# Patient Record
Sex: MALE | Race: WHITE | NOT HISPANIC OR LATINO | URBAN - METROPOLITAN AREA
[De-identification: names, ages, dates, MRNs, and addresses within clinical notes are randomized per-mention and may not be internally consistent; named-entity substitution may affect disease eponyms.]

---

## 2017-01-25 ENCOUNTER — COMMUNICATION - HEALTHEAST (OUTPATIENT)
Dept: INTERNAL MEDICINE | Facility: CLINIC | Age: 72
End: 2017-01-25

## 2017-02-01 ENCOUNTER — RECORDS - HEALTHEAST (OUTPATIENT)
Dept: ADMINISTRATIVE | Facility: OTHER | Age: 72
End: 2017-02-01

## 2017-03-06 ENCOUNTER — RECORDS - HEALTHEAST (OUTPATIENT)
Dept: ADMINISTRATIVE | Facility: OTHER | Age: 72
End: 2017-03-06

## 2017-03-28 ENCOUNTER — OFFICE VISIT (OUTPATIENT)
Dept: INTERNAL MEDICINE CLINIC | Age: 72
End: 2017-03-28

## 2017-03-28 VITALS
OXYGEN SATURATION: 97 % | HEIGHT: 68 IN | SYSTOLIC BLOOD PRESSURE: 144 MMHG | HEART RATE: 66 BPM | DIASTOLIC BLOOD PRESSURE: 86 MMHG | RESPIRATION RATE: 12 BRPM | BODY MASS INDEX: 25.61 KG/M2 | TEMPERATURE: 97.9 F | WEIGHT: 169 LBS

## 2017-03-28 DIAGNOSIS — F33.1 MODERATE EPISODE OF RECURRENT MAJOR DEPRESSIVE DISORDER (HCC): ICD-10-CM

## 2017-03-28 DIAGNOSIS — E55.9 VITAMIN D INSUFFICIENCY: ICD-10-CM

## 2017-03-28 DIAGNOSIS — Z12.11 SCREEN FOR COLON CANCER: ICD-10-CM

## 2017-03-28 DIAGNOSIS — G47.00 INSOMNIA, UNSPECIFIED TYPE: ICD-10-CM

## 2017-03-28 DIAGNOSIS — E78.5 HYPERLIPIDEMIA, UNSPECIFIED HYPERLIPIDEMIA TYPE: ICD-10-CM

## 2017-03-28 DIAGNOSIS — K21.00 GASTROESOPHAGEAL REFLUX DISEASE WITH ESOPHAGITIS: ICD-10-CM

## 2017-03-28 DIAGNOSIS — I10 BENIGN ESSENTIAL HTN: Primary | ICD-10-CM

## 2017-03-28 RX ORDER — PNEUMOCOCCAL 13-VALENT CONJUGATE VACCINE 2.2; 2.2; 2.2; 2.2; 2.2; 4.4; 2.2; 2.2; 2.2; 2.2; 2.2; 2.2; 2.2 UG/.5ML; UG/.5ML; UG/.5ML; UG/.5ML; UG/.5ML; UG/.5ML; UG/.5ML; UG/.5ML; UG/.5ML; UG/.5ML; UG/.5ML; UG/.5ML; UG/.5ML
0.5 INJECTION, SUSPENSION INTRAMUSCULAR ONCE
Qty: 1 SYRINGE | Refills: 0 | Status: SHIPPED | OUTPATIENT
Start: 2017-03-28 | End: 2017-03-28

## 2017-03-28 RX ORDER — ZOLPIDEM TARTRATE 5 MG/1
5 TABLET ORAL
Qty: 90 TAB | Refills: 1
Start: 2017-03-28 | End: 2017-07-25 | Stop reason: SDUPTHER

## 2017-03-28 RX ORDER — BUPROPION HYDROCHLORIDE 300 MG/1
300 TABLET ORAL
Qty: 90 TAB | Refills: 1
Start: 2017-03-28 | End: 2022-08-29 | Stop reason: SDUPTHER

## 2017-03-28 RX ORDER — ATORVASTATIN CALCIUM 40 MG/1
40 TABLET, FILM COATED ORAL DAILY
Qty: 90 TAB | Refills: 1
Start: 2017-03-28 | End: 2017-07-25 | Stop reason: SDUPTHER

## 2017-03-28 RX ORDER — OMEPRAZOLE 20 MG/1
20 TABLET, DELAYED RELEASE ORAL
Qty: 45 TAB | Refills: 1
Start: 2017-03-28 | End: 2017-04-24

## 2017-03-28 RX ORDER — ATENOLOL 25 MG/1
25 TABLET ORAL 2 TIMES DAILY
Qty: 180 TAB | Refills: 1 | Status: SHIPPED | OUTPATIENT
Start: 2017-03-28 | End: 2017-07-25 | Stop reason: SDUPTHER

## 2017-03-28 RX ORDER — ACETAMINOPHEN 500 MG
2000 TABLET ORAL DAILY
Qty: 30 CAP | Refills: 11
Start: 2017-03-28

## 2017-03-28 RX ORDER — SILDENAFIL 50 MG/1
50 TABLET, FILM COATED ORAL AS NEEDED
Qty: 8 TAB | Refills: 3
Start: 2017-03-28

## 2017-03-28 RX ORDER — ATORVASTATIN CALCIUM 20 MG/1
20 TABLET, FILM COATED ORAL DAILY
Qty: 90 TAB | Refills: 1
Start: 2017-03-28 | End: 2022-08-29 | Stop reason: SDUPTHER

## 2017-03-28 RX ORDER — LOSARTAN POTASSIUM AND HYDROCHLOROTHIAZIDE 12.5; 1 MG/1; MG/1
1 TABLET ORAL DAILY
Qty: 90 TAB | Refills: 1 | Status: SHIPPED | OUTPATIENT
Start: 2017-03-28 | End: 2017-11-20 | Stop reason: ALTCHOICE

## 2017-03-28 RX ORDER — ASPIRIN 81 MG/1
81 TABLET ORAL DAILY
Qty: 30 TAB | Refills: 11
Start: 2017-03-28

## 2017-03-28 NOTE — PROGRESS NOTES
Patient presents to establish care. Needs a colonoscopy. Has HTN, been fluctuating. Vaccines. Last 2 weeks had a GI issue, but it is better now.

## 2017-03-28 NOTE — PROGRESS NOTES
HISTORY OF PRESENT ILLNESS    New patient to my practice, referred to me by self. Prior medical care has been from Dr. Franco Bernal in [place], [place]. he works as a [employer]. Moved to [facility] 2017. [employer]. his  past medical history was reviewed, discussed, and summarized in the list below. Hypertension  Reports a long history of fluctuating blood pressures. He did see cardiology for this in the past.  Has been on various medications and was on a higher dose of atenolol in the past.  Is currently taking atenolol 25 mg twice daily and Hyzaar 50-12 0.5 mg. Denies side effects on these medications. Hypertension ROS: taking medications as instructed, no medication side effects noted, no TIA's, no chest pain on exertion, no dyspnea on exertion, no swelling of ankles     reports that he quit smoking about 28 years ago. He does not have any smokeless tobacco history on file. has no alcohol history on file. BP Readings from Last 2 Encounters:   03/28/17 144/86     Hyperlipidemia  He does have a history of a high coronary score several years ago. Has had stress tests since that time withdrawn normal.  Last stress test was 3 years ago. .  Taking Lipitor  40 mg until December 2016 when it was increased to 60 mg. In  He is very active and is exercising. December, his LDL cholesterol was 83. His most recent labs on March 6, 2017 showed cholesterol 149, triglyceride 96, HDL 64, LDL 66. Hepatic functions are normal.       History of depression and insomnia. Started after 1 of his divorces. Has been taking Wellbutrin for several years and it is well tolerated. Also taking Ambien for sleep. He travels often. Try to wean off Wellbutrin but became very irritable and depressed. Denies any alcohol or drug abuse.     Review of Systems   All other systems reviewed and are negative, except as noted in HPI      Past Medical and Surgical History  Past Medical History: Diagnosis Date    Arm paresthesia, right     from rototor cuff injury    Benign essential HTN     Depression, major 1999    from divorce. on wellburtrin. failed prozac.  sees psych in [place] ED (erectile dysfunction)     GERD (gastroesophageal reflux disease)     High coronary artery calcium score 2005    last stress test 2014    History of bronchitis     Hyperlipidemia     Insomnia     Personal history of multiple pulmonary nodules     no further f/u needed. followed by serial CTs, last 2010. 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 Current Outpatient Prescriptions   Medication Sig    atorvastatin (LIPITOR) 20 mg tablet Take 1 Tab by mouth daily. Takes 60 mg total    atorvastatin (LIPITOR) 40 mg tablet Take 1 Tab by mouth daily. Takes 60 mg total    losartan-hydroCHLOROthiazide (HYZAAR) 100-12.5 mg per tablet Take 1 Tab by mouth daily. Increased 3/28/17    atenolol (TENORMIN) 25 mg tablet Take 1 Tab by mouth two (2) times a day.  zolpidem (AMBIEN) 5 mg tablet Take 1 Tab by mouth nightly as needed for Sleep. Max Daily Amount: 5 mg.  buPROPion XL (WELLBUTRIN XL) 300 mg XL tablet Take 1 Tab by mouth every morning.  omeprazole (PRILOSEC OTC) 20 mg tablet Take 20 mg by mouth every fourty-eight (48) hours for 360 days.  sildenafil citrate (VIAGRA) 50 mg tablet Take 1 Tab by mouth as needed.  Cholecalciferol, Vitamin D3, (VITAMIN D3) 2,000 unit cap capsule Take 2,000 Units by mouth daily.  aspirin delayed-release 81 mg tablet Take 1 Tab by mouth daily.  varicella zoster vacine live (ZOSTAVAX) 19,400 unit/0.65 mL susr injection 1 Vial by SubCUTAneous route once for 1 dose.  Bring this to your pharmacy  Indications: PREVENTION OF HERPES ZOSTER    PREVNAR 13, PF, 0.5 mL syrg injection 0.5 mL by IntraMUSCular route once for 1 dose. PLEASE HAVE THIS AT YOUR LOCAL PHARMACY  Indications: PREVENTION OF STREPTOCOCCUS PNEUMONIAE INFECTION     No current facility-administered medications for this visit. reports that he quit smoking about 28 years ago. He does not have any smokeless tobacco history on file. family history includes Heart Disease in his brother and father; Hypertension in his father. Physical Exam   Nursing note and vitals reviewed. Blood pressure 144/86, pulse 66, temperature 97.9 °F (36.6 °C), temperature source Oral, resp. rate 12, height 5' 7.5\" (1.715 m), weight 169 lb (76.7 kg), SpO2 97 %. Constitutional: oriented to person, place, and time. No distress. Eyes: Conjunctivae are normal.   HEENT:  No cervical lymphadenopathy. No thyroid nodules or goiter  Cardiovascular: Normal rate. Regular rhythm, no murmurs, rubs. No edema  Pulmonary/Chest: Effort normal. clear to auscultation  Abdominal: soft, non-tender, non-distended  Musculoskeletal:     Neurological: Alert and oriented to person, place. Cranial nerves grossly intact. Normal gait   Skin: No rash noted. Psychiatric: Normal mood and affect. Behavior is normal.     ASSESSMENT and Racheal Singh was seen today for establish care. Diagnoses and all orders for this visit:    Benign essential HTN blood   pressure is borderline controlled. Recommended increasing Losartan to 100 mg.  -     losartan-hydroCHLOROthiazide (HYZAAR) 100-12.5 mg per tablet; Take 1 Tab by mouth daily. Increased 3/28/17  -     atenolol (TENORMIN) 25 mg tablet; Take 1 Tab by mouth two (2) times a day. -     aspirin delayed-release 81 mg tablet; Take 1 Tab by mouth daily. Hyperlipidemia, unspecified hyperlipidemia type  Appears well controlled on 60 mg of Lipitor daily. -     atorvastatin (LIPITOR) 20 mg tablet; Take 1 Tab by mouth daily.  Takes 60 mg total  -     atorvastatin (LIPITOR) 40 mg tablet; Take 1 Tab by mouth daily. Takes 60 mg total    Gastroesophageal reflux disease with esophagitis   controlled on every other day PPI. The pathophysiology of reflux is discussed. Anti-reflux measures such as raising the head of the bed, avoiding tight clothing or belts, avoiding eating late at night and not lying down shortly after mealtime and achieving weight loss are discussed. Avoid ASA, NSAID's, caffeine, peppermints, alcohol and tobacco. he should alert me if there are persistent symptoms, dysphagia, weight loss or GI bleeding.  -     omeprazole (PRILOSEC OTC) 20 mg tablet; Take 20 mg by mouth every fourty-eight (48) hours for 360 days. Screen for colon cancer  -     REFERRAL FOR COLONOSCOPY    Moderate episode of recurrent major depressive disorder ([facility] [address] stable and controlled for years. Continue current medication  -     buPROPion XL (WELLBUTRIN XL) 300 mg XL tablet; Take 1 Tab by mouth every morning. Insomnia, unspecified type-    Controlled on current regimen. Continue current medications as written in chart. zolpidem (AMBIEN) 5 mg tablet; Take 1 Tab by mouth nightly as needed for Sleep. Max Daily Amount: 5 mg. Vitamin D insufficiency  -     Cholecalciferol, Vitamin D3, (VITAMIN D3) 2,000 unit cap capsule; Take 2,000 Units by mouth daily. Controlled with vitamin D levels of 43 on labs done in December    Other orders  -     sildenafil citrate (VIAGRA) 50 mg tablet; Take 1 Tab by mouth as needed. -     varicella zoster vacine live (ZOSTAVAX) 19,400 unit/0.65 mL susr injection; 1 Vial by SubCUTAneous route once for 1 dose. Bring this to your pharmacy  Indications: PREVENTION OF HERPES ZOSTER  -     PREVNAR 13, PF, 0.5 mL syrg injection; 0.5 mL by IntraMUSCular route once for 1 dose.  PLEASE HAVE THIS AT YOUR LOCAL PHARMACY  Indications: PREVENTION OF STREPTOCOCCUS PNEUMONIAE INFECTION        There are no Patient Instructions on file for this visit.    lab results and schedule of future lab studies reviewed with patient  reviewed medications and side effects in detail      Return to clinic in 6 months to repeat your blood work.

## 2017-03-28 NOTE — MR AVS SNAPSHOT
Visit Information Date & Time Provider Department Dept. Phone Encounter #  
 3/28/2017  8:00 AM Vinnie Del Valle MD [facility] [address] [id number] Upcoming Health Maintenance Date Due DTaP/Tdap/Td series (1 - Tdap) 4/20/1964 FOBT Q 1 YEAR AGE 50-75 4/20/1993 ZOSTER VACCINE AGE 60> 4/20/2003 GLAUCOMA SCREENING Q2Y 4/20/2008 Pneumococcal 65+ Low/Medium Risk (1 of 2 - PCV13) 4/20/2008 MEDICARE YEARLY EXAM 4/20/2008 INFLUENZA AGE 9 TO ADULT 8/1/2016 Allergies as of 3/28/2017  Review Complete On: 3/28/2017 By: Vinnie Del Valle MD  
 No Known Allergies Current Immunizations  Reviewed on 3/28/2017 Name Date Influenza Vaccine 10/1/2016 Pneumococcal Polysaccharide (PPSV-23) 1/10/2014 Tdap 1/1/2015 Reviewed by Vinnie Del Valle MD on 3/28/2017 at  8:26 AM  
 Reviewed by Vinnie Del Valle MD on 3/28/2017 at  8:38 AM  
 Reviewed by Vinnie Del Valle MD on 3/28/2017 at  8:38 AM  
 Reviewed by Vinnie Del Valle MD on 3/28/2017 at  8:43 AM  
You Were Diagnosed With   
  
 Codes Comments Benign essential HTN    -  Primary ICD-10-CM: I10 
ICD-9-CM: 401.1 Hyperlipidemia, unspecified hyperlipidemia type     ICD-10-CM: E78.5 ICD-9-CM: 272.4 Gastroesophageal reflux disease with esophagitis     ICD-10-CM: K21.0 ICD-9-CM: 530.11 Screen for colon cancer     ICD-10-CM: Z12.11 ICD-9-CM: V76.51 Moderate episode of recurrent major depressive disorder (HCC)     ICD-10-CM: F33.1 ICD-9-CM: 296.32 Insomnia, unspecified type     ICD-10-CM: G47.00 ICD-9-CM: 780.52 Vitamin D insufficiency     ICD-10-CM: E55.9 ICD-9-CM: 268.9 Vitals BP Pulse Temp Resp Height(growth percentile) Weight(growth percentile) 144/86 (BP 1 Location: Left arm, BP Patient Position: Sitting) 66 97.9 °F (36.6 °C) (Oral) 12 5' 7.5\" (1.715 m) 169 lb (76.7 kg) SpO2 BMI Smoking Status 97% 26.08 kg/m2 Former Smoker Vitals History BMI and BSA Data Body Mass Index Body Surface Area 26.08 kg/m 2 1.91 m 2 Preferred Pharmacy Pharmacy Name Phone [facility], [address] [phone] Your Updated Medication List  
  
   
This list is accurate as of: 3/28/17  8:51 AM.  Always use your most recent med list.  
  
  
  
  
 aspirin delayed-release 81 mg tablet Take 1 Tab by mouth daily. atenolol 25 mg tablet Commonly known as:  TENORMIN Take 1 Tab by mouth two (2) times a day. * atorvastatin 20 mg tablet Commonly known as:  LIPITOR Take 1 Tab by mouth daily. Takes 60 mg total  
  
 * atorvastatin 40 mg tablet Commonly known as:  LIPITOR Take 1 Tab by mouth daily. Takes 60 mg total  
  
 buPROPion  mg XL tablet Commonly known as:  Leora Corleytte Take 1 Tab by mouth every morning. Cholecalciferol (Vitamin D3) 2,000 unit Cap capsule Commonly known as:  VITAMIN D3 Take 2,000 Units by mouth daily. losartan-hydroCHLOROthiazide 100-12.5 mg per tablet Commonly known as:  HYZAAR Take 1 Tab by mouth daily. Increased 3/28/17  
  
 omeprazole 20 mg tablet Commonly known as:  PRILOSEC OTC Take 20 mg by mouth every fourty-eight (48) hours for 360 days. PREVNAR 13 (PF) 0.5 mL Syrg injection Generic drug:  pneumococcal 13 angelo conj dip  
0.5 mL by IntraMUSCular route once for 1 dose. PLEASE HAVE THIS AT YOUR LOCAL PHARMACY  Indications: PREVENTION OF STREPTOCOCCUS PNEUMONIAE INFECTION  
  
 sildenafil citrate 50 mg tablet Commonly known as:  VIAGRA Take 1 Tab by mouth as needed. varicella zoster vacine live 19,400 unit/0.65 mL Susr injection Commonly known as:  ZOSTAVAX  
1 Vial by SubCUTAneous route once for 1 dose. Bring this to your pharmacy  Indications: PREVENTION OF HERPES ZOSTER  
  
 zolpidem 5 mg tablet Commonly known as:  AMBIEN  
 Take 1 Tab by mouth nightly as needed for Sleep. Max Daily Amount: 5 mg.  
  
 * Notice: This list has 2 medication(s) that are the same as other medications prescribed for you. Read the directions carefully, and ask your doctor or other care provider to review them with you. Prescriptions Printed Refills  
 varicella zoster vacine live (ZOSTAVAX) 19,400 unit/0.65 mL susr injection 0 Si Vial by SubCUTAneous route once for 1 dose. Bring this to your pharmacy  Indications: PREVENTION OF HERPES ZOSTER Class: Print Route: SubCUTAneous PREVNAR 13, PF, 0.5 mL syrg injection 0 Si.5 mL by IntraMUSCular route once for 1 dose. PLEASE HAVE THIS AT YOUR LOCAL PHARMACY  Indications: PREVENTION OF STREPTOCOCCUS PNEUMONIAE INFECTION Class: Print Route: IntraMUSCular Prescriptions Sent to Pharmacy Refills  
 losartan-hydroCHLOROthiazide (HYZAAR) 100-12.5 mg per tablet 1 Sig: Take 1 Tab by mouth daily. Increased 3/28/17 Class: Normal  
 Pharmacy: [facility] , [address] Ph #: [phone] Route: Oral  
 atenolol (TENORMIN) 25 mg tablet 1 Sig: Take 1 Tab by mouth two (2) times a day. Class: Normal  
 Pharmacy: [facility], [address] [facility] [address] Ph #: [phone] Route: Oral  
  
We Performed the Following REFERRAL FOR COLONOSCOPY [[id number] Custom] Comments:  
 Please evaluate patient for colonoscopy. Referral Information Referral ID Referred By Referred To  
  
 [id number] [phone]  [facility] Visits Status Start Date End Date 1 New Request 3/28/17 3/28/18 If your referral has a status of pending review or denied, additional information will be sent to support the outcome of this decision. Introducing \[facility]\"" & HEALTH SERVICES! Amina Monroe introduces [facility] patient portal. Now you can access parts of your medical record, email your doctor's office, and request medication refills online. 1. In your internet browser, go to [email or web address] 2. Click on the First Time User? Click Here link in the Sign In box. You will see the New Member Sign Up page. 3. Enter your [facility] Access Code exactly as it appears below. You will not need to use this code after youve completed the sign-up process. If you do not sign up before the expiration date, you must request a new code. · [facility] Access Code: [email or web address] Expires: 6/26/2017  8:51 AM 
 
4. Enter the last four digits of your Social Security Number (xxxx) and Date of Birth (mm/dd/yyyy) as indicated and click Submit. You will be taken to the next sign-up page. 5. Create a [facility] ID. This will be your [facility] login ID and cannot be changed, so think of one that is secure and easy to remember. 6. Create a [facility] password. You can change your password at any time. 7. Enter your Password Reset Question and Answer. This can be used at a later time if you forget your password. 8. Enter your e-mail address. You will receive e-mail notification when new information is available in [address] 9. Click Sign Up. You can now view and download portions of your medical record. 10. Click the Download Summary menu link to download a portable copy of your medical information. If you have questions, please visit the Frequently Asked Questions section of the [facility] website. Remember, [facility] is NOT to be used for urgent needs. For medical emergencies, dial 911. Now available from your iPhone and Android! Please provide this summary of care documentation to your next provider. Your primary care clinician is listed as Collie Link. If you have any questions after today's visit, please call [phone].

## 2017-03-30 ENCOUNTER — COMMUNICATION - HEALTHEAST (OUTPATIENT)
Dept: INTERNAL MEDICINE | Facility: CLINIC | Age: 72
End: 2017-03-30

## 2017-04-06 ENCOUNTER — COMMUNICATION - HEALTHEAST (OUTPATIENT)
Dept: INTERNAL MEDICINE | Facility: CLINIC | Age: 72
End: 2017-04-06

## 2017-04-19 ENCOUNTER — COMMUNICATION - HEALTHEAST (OUTPATIENT)
Dept: INTERNAL MEDICINE | Facility: CLINIC | Age: 72
End: 2017-04-19

## 2017-04-24 NOTE — PERIOP NOTES
[address]  Endoscopy Preprocedure Instructions      1. On the day of your surgery, please report to registration located on the 2nd floor of the  [facility]. yes    2. You must have a responsible adult to drive you to the hospital, stay at the hospital during your procedure and drive you home. If they leave your procedure will not be started (no exceptions). yes    3. Do not have anything to eat or drink (including water, gum, mints, coffee, and juice) after midnight. This does not apply to the medications you were instructed to take by your physician. yesIf you are currently taking Plavix, Coumadin, Aspirin, or other blood-thinning agents, contact your physician for special instructions. yes,    4. If you are having a procedure that requires bowel prep: We recommend that you have only clear liquids the day before your procedure and begin your bowel prep by 5:00 pm.  You may continue to drink clear liquids until midnight. If for any reason you are not able to complete your prep please notify your physician immediately. yes    5. Have a list of all current medications, including vitamins, herbal supplements and any other over the counter medications. yes    6. If you wear glasses, contacts, dentures and/or hearing aids, they may be removed prior to procedure, please bring a case to store them in. yes    7. You should understand that if you do not follow these instructions your procedure may be cancelled. If your physical condition changes (I.e. fever, cold or flu) please contact your doctor as soon as possible. 8. It is important that you be on time.   If for any reason you are unable to keep your appointment please call )- the day of or your physicians office prior to your scheduled procedure

## 2017-04-25 ENCOUNTER — ANESTHESIA (OUTPATIENT)
Dept: ENDOSCOPY | Age: 72
End: 2017-04-25
Payer: MEDICARE

## 2017-04-25 ENCOUNTER — HOSPITAL ENCOUNTER (OUTPATIENT)
Age: 72
Setting detail: OUTPATIENT SURGERY
Discharge: HOME | End: 2017-04-25
Attending: INTERNAL MEDICINE | Admitting: INTERNAL MEDICINE
Payer: MEDICARE

## 2017-04-25 ENCOUNTER — ANESTHESIA EVENT (OUTPATIENT)
Dept: ENDOSCOPY | Age: 72
End: 2017-04-25
Payer: MEDICARE

## 2017-04-25 ENCOUNTER — RECORDS - HEALTHEAST (OUTPATIENT)
Dept: ADMINISTRATIVE | Facility: OTHER | Age: 72
End: 2017-04-25

## 2017-04-25 VITALS
WEIGHT: 158 LBS | OXYGEN SATURATION: 100 % | BODY MASS INDEX: 23.95 KG/M2 | DIASTOLIC BLOOD PRESSURE: 62 MMHG | RESPIRATION RATE: 14 BRPM | HEIGHT: 68 IN | SYSTOLIC BLOOD PRESSURE: 126 MMHG | HEART RATE: 51 BPM | TEMPERATURE: 97.5 F

## 2017-04-25 LAB
H PYLORI FROM TISSUE: NEGATIVE
KIT LOT NO., HCLOLOT: NORMAL
NEGATIVE CONTROL: NEGATIVE
POSITIVE CONTROL: POSITIVE

## 2017-04-25 PROCEDURE — 76060000032 HC ANESTHESIA 0.5 TO 1 HR: Performed by: INTERNAL MEDICINE

## 2017-04-25 PROCEDURE — 74011250636 HC RX REV CODE- 250/636

## 2017-04-25 PROCEDURE — 74011000250 HC RX REV CODE- 250

## 2017-04-25 PROCEDURE — 77030009426 HC FCPS BIOP ENDOSC BSC -B: Performed by: INTERNAL MEDICINE

## 2017-04-25 PROCEDURE — 74011250636 HC RX REV CODE- 250/636: Performed by: INTERNAL MEDICINE

## 2017-04-25 PROCEDURE — 88305 TISSUE EXAM BY PATHOLOGIST: CPT | Performed by: INTERNAL MEDICINE

## 2017-04-25 PROCEDURE — 76040000007: Performed by: INTERNAL MEDICINE

## 2017-04-25 PROCEDURE — 87077 CULTURE AEROBIC IDENTIFY: CPT | Performed by: INTERNAL MEDICINE

## 2017-04-25 RX ORDER — LIDOCAINE HYDROCHLORIDE 20 MG/ML
INJECTION, SOLUTION EPIDURAL; INFILTRATION; INTRACAUDAL; PERINEURAL AS NEEDED
Status: DISCONTINUED | OUTPATIENT
Start: 2017-04-25 | End: 2017-04-25 | Stop reason: HOSPADM

## 2017-04-25 RX ORDER — FLUMAZENIL 0.1 MG/ML
0.2 INJECTION INTRAVENOUS
Status: DISCONTINUED | OUTPATIENT
Start: 2017-04-25 | End: 2017-04-25 | Stop reason: HOSPADM

## 2017-04-25 RX ORDER — PROPOFOL 10 MG/ML
INJECTION, EMULSION INTRAVENOUS AS NEEDED
Status: DISCONTINUED | OUTPATIENT
Start: 2017-04-25 | End: 2017-04-25 | Stop reason: HOSPADM

## 2017-04-25 RX ORDER — MIDAZOLAM HYDROCHLORIDE 1 MG/ML
.25-5 INJECTION, SOLUTION INTRAMUSCULAR; INTRAVENOUS
Status: DISCONTINUED | OUTPATIENT
Start: 2017-04-25 | End: 2017-04-25 | Stop reason: HOSPADM

## 2017-04-25 RX ORDER — SODIUM CHLORIDE 9 MG/ML
INJECTION, SOLUTION INTRAVENOUS
Status: DISCONTINUED | OUTPATIENT
Start: 2017-04-25 | End: 2017-04-25 | Stop reason: HOSPADM

## 2017-04-25 RX ORDER — PROPOFOL 10 MG/ML
INJECTION, EMULSION INTRAVENOUS
Status: DISCONTINUED | OUTPATIENT
Start: 2017-04-25 | End: 2017-04-25 | Stop reason: HOSPADM

## 2017-04-25 RX ORDER — ATROPINE SULFATE 0.1 MG/ML
0.4 INJECTION INTRAVENOUS
Status: DISCONTINUED | OUTPATIENT
Start: 2017-04-25 | End: 2017-04-25 | Stop reason: HOSPADM

## 2017-04-25 RX ORDER — NALOXONE HYDROCHLORIDE 0.4 MG/ML
0.4 INJECTION, SOLUTION INTRAMUSCULAR; INTRAVENOUS; SUBCUTANEOUS
Status: DISCONTINUED | OUTPATIENT
Start: 2017-04-25 | End: 2017-04-25 | Stop reason: HOSPADM

## 2017-04-25 RX ORDER — DEXTROMETHORPHAN/PSEUDOEPHED 2.5-7.5/.8
1.2 DROPS ORAL
Status: DISCONTINUED | OUTPATIENT
Start: 2017-04-25 | End: 2017-04-25 | Stop reason: HOSPADM

## 2017-04-25 RX ORDER — EPINEPHRINE 0.1 MG/ML
1 INJECTION INTRACARDIAC; INTRAVENOUS
Status: DISCONTINUED | OUTPATIENT
Start: 2017-04-25 | End: 2017-04-25 | Stop reason: HOSPADM

## 2017-04-25 RX ORDER — SODIUM CHLORIDE 9 MG/ML
50 INJECTION, SOLUTION INTRAVENOUS CONTINUOUS
Status: DISCONTINUED | OUTPATIENT
Start: 2017-04-25 | End: 2017-04-25 | Stop reason: HOSPADM

## 2017-04-25 RX ADMIN — PROPOFOL 70 MG: 10 INJECTION, EMULSION INTRAVENOUS at 12:37

## 2017-04-25 RX ADMIN — SODIUM CHLORIDE: 9 INJECTION, SOLUTION INTRAVENOUS at 11:48

## 2017-04-25 RX ADMIN — PROPOFOL 140 MCG/KG/MIN: 10 INJECTION, EMULSION INTRAVENOUS at 12:37

## 2017-04-25 RX ADMIN — SODIUM CHLORIDE: 9 INJECTION, SOLUTION INTRAVENOUS at 13:00

## 2017-04-25 RX ADMIN — LIDOCAINE HYDROCHLORIDE 40 MG: 20 INJECTION, SOLUTION EPIDURAL; INFILTRATION; INTRACAUDAL; PERINEURAL at 12:37

## 2017-04-25 RX ADMIN — SODIUM CHLORIDE 50 ML/HR: 900 INJECTION, SOLUTION INTRAVENOUS at 12:16

## 2017-04-25 NOTE — ROUTINE PROCESS
Barbara Kimberyl  1945  [id number]    Situation:  Verbal report received from: Ayana Mulligan RN  Procedure: Procedure(s):  COLONOSCOPY / EGD   ESOPHAGOGASTRODUODENOSCOPY (EGD)  ESOPHAGOGASTRODUODENAL (EGD) BIOPSY  COLON BIOPSY    Background:    Preoperative diagnosis: NAUSEA   CHANGE IN BOWEL HABITS   Postoperative diagnosis: normal egd, diverticulosis, hemorrhoids, normal terminal ileum    :  Dr. Jessica Miranda  Assistant(s): Endoscopy Technician-1: Rylee Alfredo  Endoscopy RN-1: Ciara Seals RN    Specimens:   ID Type Source Tests Collected by Time Destination   1 : biopsy duodenum Preservative Duodenum  Shelli Wall MD 4/25/2017 1243 Pathology   2 : random colon biopsy Royal Hicks MD 4/25/2017 1257 Pathology     H. Pylori  yes    Assessment:  Intra-procedure medications     Anesthesia gave intra-procedure sedation and medications, see anesthesia flow sheet yes    Intravenous fluids: NS@ KVO     Vital signs stable     Abdominal assessment: round and soft     Recommendation:  Discharge patient per MD order. Family or Friend   Permission to share finding with family or friend yes    Endoscopy discharge instructions have been reviewed and given to patient and spouse. The patient and spouse verbalized understanding and acceptance of instructions.

## 2017-04-25 NOTE — ANESTHESIA POSTPROCEDURE EVALUATION
Post-Anesthesia Evaluation and Assessment    Patient: Hernandez Bates MRN: [id number]  SSN: [id number]    YOB: 1945  Age: 74 y.o. Sex: male       Cardiovascular Function/Vital Signs  Visit Vitals    /62    Pulse (!) 51    Temp 36.4 °C (97.5 °F)    Resp 14    Ht 5' 7.5\" (1.715 m)    Wt 71.7 kg (158 lb)    SpO2 100%    BMI 24.38 kg/m2       Patient is status post MAC anesthesia for Procedure(s):  COLONOSCOPY / EGD   ESOPHAGOGASTRODUODENOSCOPY (EGD)  ESOPHAGOGASTRODUODENAL (EGD) BIOPSY  COLON BIOPSY. Nausea/Vomiting: None    Postoperative hydration reviewed and adequate. Pain:  Pain Scale 1: Numeric (0 - 10) (04/25/17 1341)  Pain Intensity 1: 0 (04/25/17 1341)   Managed    Neurological Status: At baseline    Mental Status and Level of Consciousness: Arousable    Pulmonary Status:   O2 Device: Room air (04/25/17 1341)   Adequate oxygenation and airway patent    Complications related to anesthesia: None    Post-anesthesia assessment completed.  No concerns    Signed By: Av Zaldivar MD     April 25, 2017

## 2017-04-25 NOTE — DISCHARGE INSTRUCTIONS
[address] [facility]. Isaak Milton M.D.  [phone]                 COLON and EGD DISCHARGE INSTRUCTIONS    2017    Julius Rogers  :  1945  [place] Medical Record Number:  [id number]      DISCOMFORT:  Sore throat- throat lozenges or warm salt water gargle  Redness at IV site- apply warm compress to area; if redness or soreness persist- contact your physician  There may be a slight amount of blood passed from the rectum  Gaseous discomfort- walking, belching will help relieve any discomfort  You may not operate a vehicle for 12 hours  You may not engage in an occupation involving machinery or appliances for rest of today  You may not drink alcoholic beverages for at least 12 hours  Avoid making any critical decisions for at least 24 hour  DIET:   Low fat diet. - however -  remember your colon is empty and a heavy meal will produce gas. Avoid these foods:  vegetables, fried / greasy foods, carbonated drinks for today     ACTIVITY:  You may  resume your normal daily activities it is recommended that you spend the remainder of the day resting -  avoid any strenuous activity and driving. CALL M.D. ANY SIGN OF:   Increasing pain, nausea, vomiting  Abdominal distension (swelling)  New increased bleeding (oral or rectal)  Fever (chills)  Pain in chest area  Bloody discharge from nose or mouth  Shortness of breath      Follow-up Instructions:   Call Dr. Avani Curran if any questions at [phone]. Results of procedure / biopsy in 7 to 10 days, we will call you with these results. Your endoscopy showed normal mucosa throughout the esophagus, stomach and duodenum. Your colonoscopy showed normal mucosa throughout the colon and terminal ileum. Mild diverticulosis in the sigmoid colon and internal hemorrhoids. Biopsies were obtained.

## 2017-04-25 NOTE — H&P
The patient is a 74year old male who presents with a complaint of change in bowel habits. The patient presents consultation at the request of Dr. Bia South). Note for \"Change in bowel habits\": He started few weeks ago with alternating constipation and diarrhea and has traveled to [facility] and [facility]. He has been taking fiber supplements. He reports this is not what it used to be. He had 3 colonoscopies in the past, one in [place], then one in [place] and last one was 8 years ago. He reports occaisonal bowel movements at night. He reports intermittent nausea, denies pain or weight loss. Denies any bleeding. He has not started any medications recently. When he has diarrhea, he has 3 to 4 bowel movements a day. He denies any sick contacts. Problem List/Past Medical [facility]; 4/20/2017 2:45 PM)  Depression   Hypercholesterolemia   Hypertension     Past Surgical History [facility]; 4/20/2017 2:45 PM)  Rotator Cuff Repair - Right   Appendectomy     Allergies [facility]; 4/20/2017 2:45 PM)  No Known Allergies 04/19/2017  No Known Drug Allergies 04/19/2017    Medication History [facility]; 4/20/2017 2:44 PM)  PriLOSEC  (10MG Packet, 1 Oral every other day) Active. Aspirin  (81MG Tablet DR, 1 Oral daily) Active. Lipitor  (40MG Tablet, Oral daily) Active. Lipitor  (20MG Tablet, Oral daily) Active. Wellbutrin XL  (300MG Tablet ER 24HR, Oral daily) Active. Atenolol  (25MG Tablet, Oral twice a day) Active. Hyzaar  (50-12.5MG Tablet, Oral twice a day) Active. Ambien  (5MG Tablet, Oral as needed) Active. Vitamin D  (2000UNIT Tablet, Oral daily) Active. Viagra  (100MG Tablet, Oral as needed) Active. Medications Reconciled     Family History [facility]; 4/20/2017 2:45 PM)  Hypertension  Mother, Brother, Father. Heart Disease  Brother. Social History [facility]; 4/20/2017 2:45 PM)  Marital status  . Employment status  N/a.   Blood Transfusion  No.  Alcohol Use  Moderate alcohol use.  Tobacco Use  Former smoker. Diagnostic Studies History Jaelyn Tineo; 4/20/2017 2:45 PM)  Colonoscopy     Health Maintenance History Jaelyn Tineo; 4/20/2017 2:45 PM)  Lora Civil Anamika Alicia Date: 2016. Pneumovax  Date: 2016. Review of Systems [facility] D. Laverta Bamberger; 4/20/2017 2:43 PM)  General Not Present- Chronic Fatigue, Poor Appetite, Weight Gain and Weight Loss. Skin Not Present- Itching, Rash and Skin Color Changes. HEENT Not Present- Hearing Loss and Vertigo. Respiratory Not Present- Difficulty Breathing and TB exposure. Cardiovascular Not Present- Chest Pain, Use of Antibiotics before Dental Procedures and Use of Blood Thinners. Gastrointestinal Present- See HPI. Musculoskeletal Not Present- Arthritis, Hip Replacement Surgery and Knee Replacement Surgery. Neurological Not Present- Weakness. Psychiatric Present- Depression. Endocrine Not Present- Diabetes and Thyroid Problems. Hematology Not Present- Anemia. Vitals [facility] D. Laverta Bamberger; 4/20/2017 2:42 PM)  4/20/2017 2:22 PM  Weight: 167 lb   Height: 67 in    Height was reported by patient. Body Surface Area: 1.89 m²   Body Mass Index: 26.16 kg/m²  Temp.: 100.4° F     BP: 130/80 (Sitting, Left Arm, Standard)        Assessment & Plan (Anselmo Palomino MD; 4/25/2017 12:37 PM)  Diarrhea (787.91  R19.7)  Impression: Several etiologies to be considered, rule out bacterial overgrowth, microscopic colitis or celiac disease. Samples of Xifaxan samples were given to him and recommended Florastor BID. Will proceed with work-up as outlined below. Current Plans    CBC, PLATELETS & AUT DIFF (92586)  CMP (30573)  CELIAC DISEASE, COMP (03981)  C-REACTIVE PROTEIN (28143)  SED RATE ERYTHROCYTE (50913)  VITAMIN B-12 (CYANOCOBALAMIN) (50862)  ASSAY, ELASTASE, PANCREATIC, FECAL (25311)  TSH (30289)  EGD W/BIOPSY (08430)  COLONOSCOPY/BIOPSY (50448)  Started Suprep Bowel Prep, 1 (one) Solution once, 1 Kit, 1 day starting 04/20/2017, No Refill.   CRYPTOSPORIDUM/GIARDI, INFCT ANTIGEN (57893)  Culture, Stool (05296)  OVA & PARASITE DIR SMEAR (78554)  Pt Education - How to access health information online: discussed with patient and provided information. Patient is to call me for any questions or concerns.   Follow up office visit in 2 months

## 2017-04-25 NOTE — PROCEDURES
Toshia Dalton M.D.  [phone]           2017                EGD Operative Report  [facility]  [facility]  1945  [facility]  [id number]      Indication:  Abdominal pain, epigastric, Diarrhea-presumed infectious origin     : Josie Tracy MD    Referring Provider:  Elizabeth Root MD      Anesthesia/Sedation:  MAC anesthesia    Airway assessment: No airway problems anticipated    Pre-Procedural Exam:      Airway: clear, no airway problems anticipated  Heart: RRR, without gallops or rubs  Lungs: clear bilaterally without wheezes, crackles, or rhonchi  Abdomen: soft, nontender, nondistended, bowel sounds present  Mental Status: awake, alert and oriented to person, place and time       Procedure Details     After infomed consent was obtained for the procedure, with all risks and benefits of procedure explained the patient was taken to the endoscopy suite and placed in the left lateral decubitus position. Following sequential administration of sedation as per above, the endoscope was inserted into the mouth and advanced under direct vision to second portion of the duodenum. A careful inspection was made as the gastroscope was withdrawn, including a retroflexed view of the proximal stomach; findings and interventions are described below. Findings:   Esophagus:normal  Stomach: normal   Duodenum/jejunum: normal    Therapies:  none    Specimens: Pyloritek and duodenum           Complications:   None; patient tolerated the procedure well. EBL:  None. Impression:    Upper endoscopy within normal    Recommendations:    -Await pathology. , -Await BRIANNA test result and treat for Helicobacter pylori if positive.     Josie Tracy MD

## 2017-04-25 NOTE — ANESTHESIA PREPROCEDURE EVALUATION
Anesthetic History   No history of anesthetic complications            Review of Systems / Medical History  Patient summary reviewed, nursing notes reviewed and pertinent labs reviewed    Pulmonary  Within defined limits                 Neuro/Psych         Psychiatric history     Cardiovascular    Hypertension              Exercise tolerance: >4 METS     GI/Hepatic/Renal  Within defined limits              Endo/Other  Within defined limits           Other Findings              Physical Exam    Airway  Mallampati: II  TM Distance: 4 - 6 cm  Neck ROM: normal range of motion   Mouth opening: Normal     Cardiovascular    Rhythm: regular  Rate: normal         Dental    Dentition: Lower dentition intact, Upper dentition intact and Caps/crowns     Pulmonary  Breath sounds clear to auscultation               Abdominal         Other Findings            Anesthetic Plan    ASA: 2  Anesthesia type: MAC          Induction: Intravenous  Anesthetic plan and risks discussed with: Patient

## 2017-04-25 NOTE — IP AVS SNAPSHOT
[address] [place] [address] 104 1007 [facility] 
[phone] Patient: Mya Cook MRN: [id number] NGV:4[birth year]92/3089 You are allergic to the following No active allergies Recent Documentation Height Weight BMI Smoking Status 1.715 m 71.7 kg 24.38 kg/m2 Former Smoker Emergency Contacts Name Discharge Info Relation Home Work Mobile [address] CAREGIVER [3] Spouse [3]   [phone] About your hospitalization You were admitted on:  April 25, 2017 You last received care in the:  [facility] ENDOSCOPY You were discharged on:  April 25, 2017 Unit phone number:  [phone] Why you were hospitalized Your primary diagnosis was:  Not on File Providers Seen During Your Hospitalizations Provider Role Specialty Primary office phone Merle Escobar MD Attending Provider Gastroenterology [phone] Your Primary Care Physician (PCP) Primary Care Physician Office Phone Office Fax Alexsandra Case [phone] Follow-up Information None Current Discharge Medication List  
  
CONTINUE these medications which have NOT CHANGED Dose & Instructions Dispensing Information Comments Morning Noon Evening Bedtime  
 aspirin delayed-release 81 mg tablet Your last dose was: Your next dose is:    
   
   
 Dose:  81 mg Take 1 Tab by mouth daily. Quantity:  30 Tab Refills:  11  
     
   
   
   
  
 atenolol 25 mg tablet Commonly known as:  TENORMIN Your last dose was: Your next dose is:    
   
   
 Dose:  25 mg Take 1 Tab by mouth two (2) times a day. Quantity:  180 Tab Refills:  1  
     
   
   
   
  
 * atorvastatin 20 mg tablet Commonly known as:  LIPITOR Your last dose was: Your next dose is:    
   
   
 Dose:  20 mg Take 1 Tab by mouth daily.  Takes 60 mg total  
 Quantity:  90 Tab Refills:  1  
     
   
   
   
  
 * atorvastatin 40 mg tablet Commonly known as:  LIPITOR Your last dose was: Your next dose is:    
   
   
 Dose:  40 mg Take 1 Tab by mouth daily. Takes 60 mg total  
 Quantity:  90 Tab Refills:  1  
     
   
   
   
  
 buPROPion  mg XL tablet Commonly known as:  Jaqui Legerin Your last dose was: Your next dose is:    
   
   
 Dose:  300 mg Take 1 Tab by mouth every morning. Quantity:  90 Tab Refills:  1 Cholecalciferol (Vitamin D3) 2,000 unit Cap capsule Commonly known as:  VITAMIN D3 Your last dose was: Your next dose is:    
   
   
 Dose:  2000 Units Take 2,000 Units by mouth daily. Quantity:  30 Cap Refills:  11  
     
   
   
   
  
 losartan-hydroCHLOROthiazide 100-12.5 mg per tablet Commonly known as:  HYZAAR Your last dose was: Your next dose is:    
   
   
 Dose:  1 Tab Take 1 Tab by mouth daily. Increased 3/28/17 Quantity:  90 Tab Refills:  1  
     
   
   
   
  
 sildenafil citrate 50 mg tablet Commonly known as:  VIAGRA Your last dose was: Your next dose is:    
   
   
 Dose:  50 mg Take 1 Tab by mouth as needed. Quantity:  8 Tab Refills:  3  
     
   
   
   
  
 zolpidem 5 mg tablet Commonly known as:  AMBIEN Your last dose was: Your next dose is:    
   
   
 Dose:  5 mg Take 1 Tab by mouth nightly as needed for Sleep. Max Daily Amount: 5 mg. Quantity:  90 Tab Refills:  1  
     
   
   
   
  
 * Notice: This list has 2 medication(s) that are the same as other medications prescribed for you. Read the directions carefully, and ask your doctor or other care provider to review them with you. Discharge Instructions [facility]. [facility] Cheng Lopez M.D. 
[phone] COLON and EGD DISCHARGE INSTRUCTIONS 
 
4/25/2017 Elaine Elam :  1945 [place] Medical Record Number:  [id number] DISCOMFORT: 
Sore throat- throat lozenges or warm salt water gargle Redness at IV site- apply warm compress to area; if redness or soreness persist- contact your physician There may be a slight amount of blood passed from the rectum Gaseous discomfort- walking, belching will help relieve any discomfort You may not operate a vehicle for 12 hours You may not engage in an occupation involving machinery or appliances for rest of today You may not drink alcoholic beverages for at least 12 hours Avoid making any critical decisions for at least 24 hour DIET: 
 Low fat diet.  however -  remember your colon is empty and a heavy meal will produce gas. Avoid these foods:  vegetables, fried / greasy foods, carbonated drinks for today ACTIVITY: 
You may  resume your normal daily activities it is recommended that you spend the remainder of the day resting -  avoid any strenuous activity and driving. CALL M.D. ANY SIGN OF: Increasing pain, nausea, vomiting Abdominal distension (swelling) New increased bleeding (oral or rectal) Fever (chills) Pain in chest area Bloody discharge from nose or mouth Shortness of breath Follow-up Instructions: 
 Call Dr. Roxy Alvarez if any questions at [phone]. Results of procedure / biopsy in 7 to 10 days, we will call you with these results. Your endoscopy showed normal mucosa throughout the esophagus, stomach and duodenum. Your colonoscopy showed normal mucosa throughout the colon and terminal ileum. Mild diverticulosis in the sigmoid colon and internal hemorrhoids. Biopsies were obtained. Discharge Orders None Introducing \[facility] [place] [facility]\"" & HEALTH SERVICES! Dear Jeanette Knight: Thank you for requesting a [employer] account. Our records indicate that you already have an active [email or web address] account. You can access your account anytime at [email or web address]. [email or web address] Did you know that you can access your hospital and ER discharge instructions at any time in [employer]? You can also review all of your test results from your hospital stay or ER visit. Additional Information If you have questions, please visit the Frequently Asked Questions section of the [employer] website at [email or web address] Remember, [employer]uehart is NOT to be used for urgent needs. For medical emergencies, dial 911. Now available from your iPhone and Android! General Information Please provide this summary of care documentation to your next provider. Patient Signature:  ____________________________________________________________ Date:  ____________________________________________________________  
  
Michael Kulwinder Provider Signature:  ____________________________________________________________ Date:  ____________________________________________________________

## 2017-06-18 ENCOUNTER — COMMUNICATION - HEALTHEAST (OUTPATIENT)
Dept: INTERNAL MEDICINE | Facility: CLINIC | Age: 72
End: 2017-06-18

## 2017-09-13 ENCOUNTER — COMMUNICATION - HEALTHEAST (OUTPATIENT)
Dept: INTERNAL MEDICINE | Facility: CLINIC | Age: 72
End: 2017-09-13

## 2017-09-13 DIAGNOSIS — I25.10 CORONARY ATHEROSCLEROSIS: ICD-10-CM

## 2017-09-26 ENCOUNTER — RECORDS - HEALTHEAST (OUTPATIENT)
Dept: ADMINISTRATIVE | Facility: OTHER | Age: 72
End: 2017-09-26

## 2017-09-27 ENCOUNTER — COMMUNICATION - HEALTHEAST (OUTPATIENT)
Dept: INTERNAL MEDICINE | Facility: CLINIC | Age: 72
End: 2017-09-27

## 2017-09-27 DIAGNOSIS — Z00.00 HEALTH CARE MAINTENANCE: ICD-10-CM

## 2017-09-27 DIAGNOSIS — Z12.5 PROSTATE CANCER SCREENING: ICD-10-CM

## 2017-09-27 DIAGNOSIS — K21.9 GERD (GASTROESOPHAGEAL REFLUX DISEASE): ICD-10-CM

## 2017-09-27 DIAGNOSIS — I25.10 CAD (CORONARY ARTERY DISEASE): ICD-10-CM

## 2017-11-20 ENCOUNTER — OFFICE VISIT (OUTPATIENT)
Dept: INTERNAL MEDICINE CLINIC | Age: 72
End: 2017-11-20

## 2017-11-20 VITALS
HEART RATE: 59 BPM | WEIGHT: 167 LBS | SYSTOLIC BLOOD PRESSURE: 149 MMHG | BODY MASS INDEX: 26.21 KG/M2 | DIASTOLIC BLOOD PRESSURE: 65 MMHG | RESPIRATION RATE: 12 BRPM | TEMPERATURE: 97.5 F | HEIGHT: 67 IN

## 2017-11-20 DIAGNOSIS — I10 BENIGN ESSENTIAL HTN: ICD-10-CM

## 2017-11-20 DIAGNOSIS — L08.9 INFECTED SUPERFICIAL INJURY OF LEFT HAND, INITIAL ENCOUNTER: Primary | ICD-10-CM

## 2017-11-20 DIAGNOSIS — S60.922A INFECTED SUPERFICIAL INJURY OF LEFT HAND, INITIAL ENCOUNTER: Primary | ICD-10-CM

## 2017-11-20 RX ORDER — AMOXICILLIN AND CLAVULANATE POTASSIUM 875; 125 MG/1; MG/1
1 TABLET, FILM COATED ORAL 2 TIMES DAILY
Qty: 20 TAB | Refills: 0 | Status: SHIPPED | OUTPATIENT
Start: 2017-11-20 | End: 2017-11-29 | Stop reason: SDUPTHER

## 2017-11-20 RX ORDER — ATENOLOL 50 MG/1
TABLET ORAL
Qty: 135 TAB | Refills: 1
Start: 2017-11-20 | End: 2019-01-28 | Stop reason: SDUPTHER

## 2017-11-20 NOTE — MR AVS SNAPSHOT
Visit Information Date & Time Provider Department Dept. Phone Encounter #  
 11/20/2017 11:00 AM Megan Aleman MD [facility] [address] [place] [id number] Upcoming Health Maintenance Date Due FOBT Q 1 YEAR AGE 50-75 4/20/1993 ZOSTER VACCINE AGE 60> 2/20/2003 GLAUCOMA SCREENING Q2Y 4/20/2008 Pneumococcal 65+ Low/Medium Risk (2 of 2 - PCV13) 1/10/2015 Influenza Age 5 to Adult 8/1/2017 MEDICARE YEARLY EXAM 12/14/2017 DTaP/Tdap/Td series (2 - Td) 1/1/2025 Allergies as of 11/20/2017  Review Complete On: 11/20/2017 By: Megan Aleman MD  
 No Known Allergies Current Immunizations  Reviewed on 3/28/2017 Name Date Influenza Vaccine 10/1/2016 Pneumococcal Polysaccharide (PPSV-23) 1/10/2014 Tdap 1/1/2015 Not reviewed this visit You Were Diagnosed With   
  
 Codes Comments Infected superficial injury of left hand, initial encounter    -  Primary ICD-10-CM: M17.433N, L08.9 ICD-9-CM: 914.9 Benign essential HTN     ICD-10-CM: I10 
ICD-9-CM: 401.1 Vitals BP Pulse Temp Resp Height(growth percentile) Weight(growth percentile) 149/65 (!) 59 97.5 °F (36.4 °C) 12 5' 7\" (1.702 m) 167 lb (75.8 kg) BMI Smoking Status 26.16 kg/m2 Former Smoker Vitals History BMI and BSA Data Body Mass Index Body Surface Area  
 26.16 kg/m 2 1.89 m 2 Preferred Pharmacy Pharmacy Name Phone [facility], [address] [phone] Your Updated Medication List  
  
   
This list is accurate as of: 11/20/17 12:11 PM.  Always use your most recent med list.  
  
  
  
  
 amoxicillin-clavulanate 875-125 mg per tablet Commonly known as:  AUGMENTIN Take 1 Tab by mouth two (2) times a day for 10 days. aspirin delayed-release 81 mg tablet Take 1 Tab by mouth daily. atenolol 50 mg tablet Commonly known as:  TENORMIN Take 1 pill in AM and 1/2 pill in PM  
  
 * atorvastatin 20 mg tablet Commonly known as:  LIPITOR Take 1 Tab by mouth daily. Takes 60 mg total  
  
 * atorvastatin 40 mg tablet Commonly known as:  LIPITOR Take 1 Tab by mouth daily. Takes 60 mg total  
  
 buPROPion  mg XL tablet Commonly known as:  Johnice Abdul Take 1 Tab by mouth every morning. Cholecalciferol (Vitamin D3) 2,000 unit Cap capsule Commonly known as:  VITAMIN D3 Take 2,000 Units by mouth daily. losartan-hydroCHLOROthiazide 50-12.5 mg per tablet Commonly known as:  HYZAAR Take 1 Tab by mouth two (2) times a day. sildenafil citrate 50 mg tablet Commonly known as:  VIAGRA Take 1 Tab by mouth as needed. zolpidem 5 mg tablet Commonly known as:  AMBIEN Take 1 Tab by mouth nightly as needed for Sleep. Max Daily Amount: 5 mg.  
  
 * Notice: This list has 2 medication(s) that are the same as other medications prescribed for you. Read the directions carefully, and ask your doctor or other care provider to review them with you. Prescriptions Sent to Pharmacy Refills  
 amoxicillin-clavulanate (AUGMENTIN) 875-125 mg per tablet 0 Sig: Take 1 Tab by mouth two (2) times a day for 10 days. Class: Normal  
 Pharmacy: [facility], [address] #: [phone] Route: Oral  
  
Introducing [facility] & HEALTH SERVICES! Dear Flor Hernandez: Thank you for requesting a [employer] account. Our records indicate that you already have an active [employer] account. You can access your account anytime at [email or web address]/[email or web address] Did you know that you can access your hospital and ER discharge instructions at any time in Cavium? You can also review all of your test results from your hospital stay or ER visit. Additional Information If you have questions, please visit the Frequently Asked Questions section of the [employer]se[employer] website at [email or web address] Remember, [employer] is NOT to be used for urgent needs. For medical emergencies, dial 911. Now available from your iPhone and Android! Please provide this summary of care documentation to your next provider. Your primary care clinician is listed as Jose Miguel Gutierrez. If you have any questions after today's visit, please call [phone].

## 2017-11-20 NOTE — PROGRESS NOTES
HISTORY OF PRESENT ILLNESS  Aracelis Talamantes is a 74 y.o. male. HPI  Presents with injury to the base of his thumb 5 weeks ago. Was cleaning up a Martini glass and is broke and cut into the base of his thumb. Went to [facility] and sutures were placed. Removed sutures himself. The scar has been healing, but is now red and swollen for 3 days. No streaking, fever, chills. No weakness of numbness of his distal thumb  Review of Systems   Constitutional: Negative for chills, diaphoresis, fever, malaise/fatigue and weight loss. Eyes: Negative for blurred vision. Respiratory: Negative for shortness of breath. Cardiovascular: Negative for chest pain. Gastrointestinal: Negative for abdominal pain. Genitourinary: Negative for flank pain and frequency. Musculoskeletal: Negative for myalgias. Skin: Positive for rash. Neurological: Negative for dizziness, weakness and headaches. Psychiatric/Behavioral: The patient is not nervous/anxious. All other systems reviewed and are negative. Physical Exam   Musculoskeletal:        Hands:  1 cm mildly edematous, erythematous w no fluctuance or streaking       ASSESSMENT and PLAN  Diagnoses and all orders for this visit:    1. Infected superficial injury of left hand, initial encounter - mild infection. No current abscess  -     amoxicillin-clavulanate (AUGMENTIN) 875-125 mg per tablet; Take 1 Tab by mouth two (2) times a day for 10 days. 2. Benign essential HTN - based on my history, the overall control of this problem borderline controlled. -     atenolol (TENORMIN) 50 mg tablet;  Take 1 pill in AM and 1/2 pill in PM

## 2017-11-29 DIAGNOSIS — S60.922A INFECTED SUPERFICIAL INJURY OF LEFT HAND, INITIAL ENCOUNTER: ICD-10-CM

## 2017-11-29 DIAGNOSIS — L08.9 INFECTED SUPERFICIAL INJURY OF LEFT HAND, INITIAL ENCOUNTER: ICD-10-CM

## 2017-11-29 RX ORDER — AMOXICILLIN AND CLAVULANATE POTASSIUM 875; 125 MG/1; MG/1
1 TABLET, FILM COATED ORAL 2 TIMES DAILY
Qty: 20 TAB | Refills: 0 | Status: SHIPPED | OUTPATIENT
Start: 2017-11-29 | End: 2017-12-09

## 2017-12-15 ENCOUNTER — AMBULATORY - HEALTHEAST (OUTPATIENT)
Dept: INTERNAL MEDICINE | Facility: CLINIC | Age: 72
End: 2017-12-15

## 2017-12-18 ENCOUNTER — OFFICE VISIT - HEALTHEAST (OUTPATIENT)
Dept: INTERNAL MEDICINE | Facility: CLINIC | Age: 72
End: 2017-12-18

## 2017-12-18 ENCOUNTER — AMBULATORY - HEALTHEAST (OUTPATIENT)
Dept: LAB | Facility: CLINIC | Age: 72
End: 2017-12-18

## 2017-12-18 DIAGNOSIS — Z00.00 ENCOUNTER FOR MEDICARE ANNUAL WELLNESS EXAM: ICD-10-CM

## 2017-12-18 DIAGNOSIS — Z00.00 HEALTH CARE MAINTENANCE: ICD-10-CM

## 2017-12-18 DIAGNOSIS — I25.10 CORONARY ATHEROSCLEROSIS: ICD-10-CM

## 2017-12-18 DIAGNOSIS — I25.10 CAD (CORONARY ARTERY DISEASE): ICD-10-CM

## 2017-12-18 DIAGNOSIS — Z12.11 SCREEN FOR COLON CANCER: ICD-10-CM

## 2017-12-18 DIAGNOSIS — I10 ESSENTIAL HYPERTENSION, BENIGN: ICD-10-CM

## 2017-12-18 DIAGNOSIS — Z12.5 PROSTATE CANCER SCREENING: ICD-10-CM

## 2017-12-18 DIAGNOSIS — K21.9 GASTROESOPHAGEAL REFLUX DISEASE WITHOUT ESOPHAGITIS: ICD-10-CM

## 2017-12-18 DIAGNOSIS — G47.00 INSOMNIA: ICD-10-CM

## 2017-12-18 DIAGNOSIS — K21.9 GERD (GASTROESOPHAGEAL REFLUX DISEASE): ICD-10-CM

## 2017-12-18 LAB
CHOLEST SERPL-MCNC: 126 MG/DL
FASTING STATUS PATIENT QL REPORTED: YES
HDLC SERPL-MCNC: 49 MG/DL
LDLC SERPL CALC-MCNC: 64 MG/DL
PSA SERPL-MCNC: 2.1 NG/ML (ref 0–6.5)
TRIGL SERPL-MCNC: 66 MG/DL

## 2017-12-18 ASSESSMENT — MIFFLIN-ST. JEOR: SCORE: 1427.07

## 2017-12-20 ENCOUNTER — COMMUNICATION - HEALTHEAST (OUTPATIENT)
Dept: INTERNAL MEDICINE | Facility: CLINIC | Age: 72
End: 2017-12-20

## 2018-02-12 ENCOUNTER — COMMUNICATION - HEALTHEAST (OUTPATIENT)
Dept: INTERNAL MEDICINE | Facility: CLINIC | Age: 73
End: 2018-02-12

## 2018-03-02 ENCOUNTER — OFFICE VISIT (OUTPATIENT)
Dept: CARDIOLOGY CLINIC | Age: 73
End: 2018-03-02

## 2018-03-02 VITALS
BODY MASS INDEX: 25.43 KG/M2 | DIASTOLIC BLOOD PRESSURE: 80 MMHG | OXYGEN SATURATION: 99 % | RESPIRATION RATE: 16 BRPM | WEIGHT: 162 LBS | SYSTOLIC BLOOD PRESSURE: 130 MMHG | HEART RATE: 68 BPM | HEIGHT: 67 IN

## 2018-03-02 DIAGNOSIS — R93.1 HIGH CORONARY ARTERY CALCIUM SCORE: ICD-10-CM

## 2018-03-02 DIAGNOSIS — R07.9 CHEST PAIN, UNSPECIFIED TYPE: ICD-10-CM

## 2018-03-02 DIAGNOSIS — I10 BENIGN ESSENTIAL HTN: Primary | ICD-10-CM

## 2018-03-02 RX ORDER — METRONIDAZOLE 7.5 MG/G
LOTION TOPICAL AS NEEDED
COMMUNITY
Start: 2017-12-18 | End: 2022-10-26 | Stop reason: SDUPTHER

## 2018-03-02 RX ORDER — ATENOLOL 25 MG/1
TABLET ORAL
COMMUNITY
Start: 2017-12-18 | End: 2019-01-28 | Stop reason: ALTCHOICE

## 2018-03-02 RX ORDER — SILDENAFIL CITRATE 100 MG/1
TABLET, FILM COATED ORAL
COMMUNITY
Start: 2017-12-18 | End: 2018-03-02

## 2018-03-02 NOTE — PROGRESS NOTES
Visit Vitals    /80 (BP 1 Location: Right arm, BP Patient Position: Sitting)    Pulse 68    Resp 16    Ht 5' 7\" (1.702 m)    Wt 162 lb (73.5 kg)    SpO2 99%    BMI 25.37 kg/m2

## 2018-03-02 NOTE — MR AVS SNAPSHOT
[facility] [address] [facility] 
[phone] Patient: Anmol Castro MRN: [id number] WPD:3/32/7078 Visit Information Date & Time Provider Department Dept. Phone Encounter #  
 3/2/2018 11:00 AM Tammy Otero MD [facility] Shakira Metz [phone] [id number] Your Appointments 3/5/2018  2:00 PM  
ECHO CARDIOGRAMS 2D with ECHO, STFRANCIS  
[facility] (JAMES SCHEDULING) Appt Note: echo at 2pm per dr Avalos [place] dx cp reg stress test at [facility] [address] [place] [address]  
[phone]  
  
   
 [address] [place] [address]  
  
    
 3/5/2018  3:00 PM  
STRESS ECHOCARDIOGRAMS with ARIELA ROLAND  
[facility] ([facility]) Appt Note: echo at 2pm per dr Avalos  dx cp reg stress test at [facility] [address] [place] [address] [facility] [address] [address] [facility] Upcoming Health Maintenance Date Due FOBT Q 1 YEAR AGE 50-75 1993 ZOSTER VACCINE AGE 60> 2003 GLAUCOMA SCREENING Q2Y 2008 Pneumococcal 65+ Low/Medium Risk (2 of 2 - PCV13) 1/10/2015 Influenza Age 5 to Adult 2017 MEDICARE YEARLY EXAM 2017 DTaP/Tdap/Td series (2 - Td) 2025 Allergies as of 3/2/2018  Review Complete On: 3/2/2018 By: Michaela Camargo  
 No Known Allergies Current Immunizations  Reviewed on 3/28/2017 Name Date Influenza Vaccine 10/1/2016 Pneumococcal Polysaccharide (PPSV-23) 1/10/2014 Tdap 2015 Not reviewed this visit You Were Diagnosed With   
  
 Codes Comments Benign essential HTN    -  Primary ICD-10-CM: I10 
ICD-9-CM: 401.1 Vitals BP Pulse Resp Height(growth percentile) Weight(growth percentile) SpO2 130/80 (BP 1 Location: Right arm, BP Patient Position: Sitting) 68 16 5' 7\" (1.702 m) 162 lb (73.5 kg) 99% BMI Smoking Status 25.37 kg/m2 Former Smoker Vitals History BMI and BSA Data Body Mass Index Body Surface Area  
 25.37 kg/m 2 1.86 m 2 Preferred Pharmacy Pharmacy Name Phone [facility], [address] [phone] Your Updated Medication List  
  
   
This list is accurate as of 3/2/18 11:07 AM.  Always use your most recent med list.  
  
  
  
  
 aspirin delayed-release 81 mg tablet Take 1 Tab by mouth daily. * atenolol 50 mg tablet Commonly known as:  TENORMIN Take 1 pill in AM and 1/2 pill in PM  
  
 * atenolol 25 mg tablet Commonly known as:  TENORMIN  
  
 * atorvastatin 20 mg tablet Commonly known as:  LIPITOR Take 1 Tab by mouth daily. Takes 60 mg total  
  
 * atorvastatin 40 mg tablet Commonly known as:  LIPITOR Take 1 Tab by mouth daily. Takes 60 mg total  
  
 buPROPion  mg XL tablet Commonly known as:  Loralyn Fujita Take 1 Tab by mouth every morning. Cholecalciferol (Vitamin D3) 2,000 unit Cap capsule Commonly known as:  VITAMIN D3 Take 2,000 Units by mouth daily. losartan-hydroCHLOROthiazide 50-12.5 mg per tablet Commonly known as:  HYZAAR Take 1 Tab by mouth two (2) times a day. metroNIDAZOLE 0.75 % lotion Commonly known as:  METROLOTION  
  
 sildenafil citrate 50 mg tablet Commonly known as:  VIAGRA Take 1 Tab by mouth as needed. zolpidem 5 mg tablet Commonly known as:  AMBIEN Take 1 Tab by mouth nightly as needed for Sleep. Max Daily Amount: 5 mg.  
  
 * Notice: This list has 4 medication(s) that are the same as other medications prescribed for you. Read the directions carefully, and ask your doctor or other care provider to review them with you. We Performed the Following AMB POC EKG ROUTINE W/ 12 LEADS, INTER & REP [49113 CPT(R)] Introducing [facility]! Dear Kiet Prasad: Thank you for requesting a [employer] account. Our records indicate that you already have an active [employer] account. You can access your account anytime at [email or web address] Did you know that you can access your hospital and ER discharge instructions at any time in [employer]? You can also review all of your test results from your hospital stay or ER visit. Additional Information If you have questions, please visit the Frequently Asked Questions section of the [employer] website at [email or web address] Remember, [employer] is NOT to be used for urgent needs. For medical emergencies, dial 911. Now available from your iPhone and Android! Please provide this summary of care documentation to your next provider. Your primary care clinician is listed as Phys Other. If you have any questions after today's visit, please call [phone].

## 2018-03-02 NOTE — PROGRESS NOTES
Chief Complaint   Patient presents with    New Patient     Benign essential HTN, Hyperlipidemia     1. Have you been to the ER, urgent care clinic since your last visit? Hospitalized since your last visit? No    2. Have you seen or consulted any other health care providers outside of the [address] [place] [address] since your last visit? Include any pap smears or colon screening.  No

## 2018-03-02 NOTE — PROGRESS NOTES
Lizy Fairchild MD. [facility]              Patient: Dorothy Rai  : 1945      Today's Date: 3/2/2018            HISTORY OF PRESENT ILLNESS:     History of Present Illness:  Mr. Gage Muñoz is here to establish care. Has coronary calcifications. No cardiac complaints. No CP or SOB. Works out on a treadmill. Feels well. Very busy life. PAST MEDICAL HISTORY:     Past Medical History:   Diagnosis Date    Arm paresthesia, right     from rototor cuff injury    Benign essential HTN     Depression, major     from divorce. on wellburtrin. failed prozac.  sees psych in [place] ED (erectile dysfunction)     GERD (gastroesophageal reflux disease)     High coronary artery calcium score     last stress test ;   CAC score 1400 in  per patient    History of bronchitis     Hyperlipidemia     Insomnia     Personal history of multiple pulmonary nodules     no further f/u needed. followed by serial CTs, last . 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  normal per pt in [address] Right            MEDICATIONS:     Current Outpatient Prescriptions   Medication Sig Dispense Refill    atenolol (TENORMIN) 25 mg tablet       metroNIDAZOLE (METROLOTION) 0.75 % lotion       atenolol (TENORMIN) 50 mg tablet Take 1 pill in AM and 1/2 pill in  Tab 1    atorvastatin (LIPITOR) 40 mg tablet Take 1 Tab by mouth daily. Takes 60 mg total 90 Tab 1    zolpidem (AMBIEN) 5 mg tablet Take 1 Tab by mouth nightly as needed for Sleep. Max Daily Amount: 5 mg.  90 Tab 1    losartan-hydroCHLOROthiazide (HYZAAR) 50-12.5 mg per tablet Take 1 Tab by mouth two (2) times a day. 180 Tab 1    atorvastatin (LIPITOR) 20 mg tablet Take 1 Tab by mouth daily. Takes 60 mg total 90 Tab 1    buPROPion XL (WELLBUTRIN XL) 300 mg XL tablet Take 1 Tab by mouth every morning. 90 Tab 1    sildenafil citrate (VIAGRA) 50 mg tablet Take 1 Tab by mouth as needed. 8 Tab 3    Cholecalciferol, Vitamin D3, (VITAMIN D3) 2,000 unit cap capsule Take 2,000 Units by mouth daily. 30 Cap 11    aspirin delayed-release 81 mg tablet Take 1 Tab by mouth daily. 30 Tab 11       No Known Allergies          SOCIAL HISTORY:     Social History   Substance Use Topics    Smoking status: Former Smoker     Quit date: 1/1/1989    Smokeless tobacco: Never Used    Alcohol use 4.2 oz/week     7 Glasses of wine per week         FAMILY HISTORY:     Family History   Problem Relation Age of Onset    Heart Disease Father     Hypertension Father     Heart Disease Brother              REVIEW OF SYMPTOMS:     Review of Symptoms:  Constitutional: Negative for fever, chills  HEENT: Negative for nosebleeds, tinnitus, and vision changes. Respiratory: Negative for cough, wheezing  Cardiovascular: Negative for orthopnea, claudication, syncope, and PND. Gastrointestinal: Negative for abdominal pain, melena. Genitourinary: Negative for dysuria  Musculoskeletal: Negative for myalgias. Skin: Negative for rash  Heme: No problems bleeding. Neurological: Negative for speech change and focal weakness. PHYSICAL EXAM:     Physical Exam:  Visit Vitals    /80 (BP 1 Location: Right arm, BP Patient Position: Sitting)    Pulse 68    Resp 16    Ht 5' 7\" (1.702 m)    Wt 162 lb (73.5 kg)    SpO2 99%    BMI 25.37 kg/m2     Patient appears generally well, mood and affect are appropriate and pleasant. HEENT:  Hearing intact, non-icteric, normocephalic, atraumatic. Neck Exam: Supple, No JVD or carotid bruits. Lung Exam: Clear to auscultation, even breath sounds. Cardiac Exam: Regular rate and rhythm with 2/6 systolic murmur  Abdomen: Soft, non-tender, normal bowel sounds. No bruits or masses. Extremities: Moves all ext well. No lower extremity edema. Vascular: 2+ dorsalis pedis pulses bilaterally. Psych: Appropriate affect  Neuro - Grossly intact        LABS / OTHER STUDIES:     Labs 12/17 - CMP OK, CBC OK, chol 126, TG 66, HDL 49, LDL 64        CARDIAC DIAGNOSTICS:     Cardiac Evaluation Includes:  EKG 3/2/18 - NSR, normal             ASSESSMENT AND PLAN:     Assessment and Plan:  1) Atherosclerosis   - CAC score 1400 in 2006 per patient.    - No prior CV event event. - Normal stress tests in past   - Denies any anginal complaints -- works out regularly. - Has some mild chest discomfort when he is anxious   - recheck a treadmill stress test to rule out high risk findings (also check an echo given murmur)     2) HTN  - BP goal < 130/80   - continue current meds     3) Dyslipidemia  - lipids OK   - cont statin     4) PCP is in [place]. Is to establish here soon. 5) Phone FU after testing. See me back in one year. Patient expressed understanding of the plan - questions were answered. Originally from [place]. 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 Pooja Woodall MD, [address]  Ph: [phone]   Ph [phone]      ADDENDUM   3/5/2018  Treadmill Stress test 3/5/18 - walked 10:55 (13.4 METS). Normal study   Echo 3/5/18 - LVEF 65%, mild-mod MAC and mild MR, AV sclerosis     Will have nurse call with essentially normal results. Stress test looks good. Murmur is likely from Aortic valve sclerosis.

## 2018-03-05 ENCOUNTER — CLINICAL SUPPORT (OUTPATIENT)
Dept: CARDIOLOGY CLINIC | Age: 73
End: 2018-03-05

## 2018-03-05 DIAGNOSIS — R93.1 HIGH CORONARY ARTERY CALCIUM SCORE: ICD-10-CM

## 2018-03-05 DIAGNOSIS — I10 BENIGN ESSENTIAL HTN: ICD-10-CM

## 2018-03-05 DIAGNOSIS — R07.89 CHEST DISCOMFORT: Primary | ICD-10-CM

## 2018-03-05 DIAGNOSIS — E78.5 HYPERLIPIDEMIA, UNSPECIFIED HYPERLIPIDEMIA TYPE: ICD-10-CM

## 2018-03-05 DIAGNOSIS — R01.1 MURMUR: Primary | ICD-10-CM

## 2018-03-05 NOTE — PROGRESS NOTES
Explained procedure to patient, monitoring EKG/HR/BP,  walking on treadmill,  and risks/discomforts. All concerns and questions addressed. Consent obtained. Pt achieved 13.4  METS,  (95% of THR) at peak exercise. See scanned report.  ordered and Dr. Antionette Saenz read study. ID verified per protocol. Pt  reported no symptoms at completion of protocol.

## 2018-03-06 ENCOUNTER — COMMUNICATION - HEALTHEAST (OUTPATIENT)
Dept: INTERNAL MEDICINE | Facility: CLINIC | Age: 73
End: 2018-03-06

## 2018-03-12 ENCOUNTER — COMMUNICATION - HEALTHEAST (OUTPATIENT)
Dept: INTERNAL MEDICINE | Facility: CLINIC | Age: 73
End: 2018-03-12

## 2018-04-13 ENCOUNTER — RECORDS - HEALTHEAST (OUTPATIENT)
Dept: ADMINISTRATIVE | Facility: OTHER | Age: 73
End: 2018-04-13

## 2018-04-18 ENCOUNTER — COMMUNICATION - HEALTHEAST (OUTPATIENT)
Dept: INTERNAL MEDICINE | Facility: CLINIC | Age: 73
End: 2018-04-18

## 2018-05-16 ENCOUNTER — COMMUNICATION - HEALTHEAST (OUTPATIENT)
Dept: INTERNAL MEDICINE | Facility: CLINIC | Age: 73
End: 2018-05-16

## 2018-05-17 ENCOUNTER — COMMUNICATION - HEALTHEAST (OUTPATIENT)
Dept: INTERNAL MEDICINE | Facility: CLINIC | Age: 73
End: 2018-05-17

## 2018-11-16 ENCOUNTER — COMMUNICATION - HEALTHEAST (OUTPATIENT)
Dept: INTERNAL MEDICINE | Facility: CLINIC | Age: 73
End: 2018-11-16

## 2018-11-17 ENCOUNTER — COMMUNICATION - HEALTHEAST (OUTPATIENT)
Dept: INTERNAL MEDICINE | Facility: CLINIC | Age: 73
End: 2018-11-17

## 2018-11-19 ENCOUNTER — COMMUNICATION - HEALTHEAST (OUTPATIENT)
Dept: INTERNAL MEDICINE | Facility: CLINIC | Age: 73
End: 2018-11-19

## 2018-11-20 ENCOUNTER — AMBULATORY - HEALTHEAST (OUTPATIENT)
Dept: INTERNAL MEDICINE | Facility: CLINIC | Age: 73
End: 2018-11-20

## 2018-11-20 ENCOUNTER — COMMUNICATION - HEALTHEAST (OUTPATIENT)
Dept: INTERNAL MEDICINE | Facility: CLINIC | Age: 73
End: 2018-11-20

## 2018-11-20 DIAGNOSIS — Z12.5 PROSTATE CANCER SCREENING: ICD-10-CM

## 2018-11-20 DIAGNOSIS — I25.10 CAD (CORONARY ARTERY DISEASE): ICD-10-CM

## 2018-11-20 DIAGNOSIS — Z00.00 HEALTH CARE MAINTENANCE: ICD-10-CM

## 2018-11-20 DIAGNOSIS — K21.9 GERD (GASTROESOPHAGEAL REFLUX DISEASE): ICD-10-CM

## 2018-11-28 ENCOUNTER — RECORDS - HEALTHEAST (OUTPATIENT)
Dept: ADMINISTRATIVE | Facility: OTHER | Age: 73
End: 2018-11-28

## 2018-12-02 ENCOUNTER — COMMUNICATION - HEALTHEAST (OUTPATIENT)
Dept: INTERNAL MEDICINE | Facility: CLINIC | Age: 73
End: 2018-12-02

## 2018-12-12 ENCOUNTER — COMMUNICATION - HEALTHEAST (OUTPATIENT)
Dept: INTERNAL MEDICINE | Facility: CLINIC | Age: 73
End: 2018-12-12

## 2018-12-13 ENCOUNTER — COMMUNICATION - HEALTHEAST (OUTPATIENT)
Dept: INTERNAL MEDICINE | Facility: CLINIC | Age: 73
End: 2018-12-13

## 2018-12-13 DIAGNOSIS — I10 ESSENTIAL HYPERTENSION, BENIGN: ICD-10-CM

## 2018-12-13 DIAGNOSIS — I25.10 CORONARY ATHEROSCLEROSIS: ICD-10-CM

## 2018-12-19 ENCOUNTER — COMMUNICATION - HEALTHEAST (OUTPATIENT)
Dept: INTERNAL MEDICINE | Facility: CLINIC | Age: 73
End: 2018-12-19

## 2018-12-20 ENCOUNTER — COMMUNICATION - HEALTHEAST (OUTPATIENT)
Dept: INTERNAL MEDICINE | Facility: CLINIC | Age: 73
End: 2018-12-20

## 2018-12-27 ENCOUNTER — OFFICE VISIT - HEALTHEAST (OUTPATIENT)
Dept: INTERNAL MEDICINE | Facility: CLINIC | Age: 73
End: 2018-12-27

## 2018-12-27 ENCOUNTER — COMMUNICATION - HEALTHEAST (OUTPATIENT)
Dept: INTERNAL MEDICINE | Facility: CLINIC | Age: 73
End: 2018-12-27

## 2018-12-27 DIAGNOSIS — F33.9 MAJOR DEPRESSION, RECURRENT, CHRONIC (H): ICD-10-CM

## 2018-12-27 DIAGNOSIS — I10 ESSENTIAL HYPERTENSION, BENIGN: ICD-10-CM

## 2018-12-27 DIAGNOSIS — K21.9 GASTROESOPHAGEAL REFLUX DISEASE WITHOUT ESOPHAGITIS: ICD-10-CM

## 2018-12-27 DIAGNOSIS — R73.01 IFG (IMPAIRED FASTING GLUCOSE): ICD-10-CM

## 2018-12-27 DIAGNOSIS — Z00.00 ENCOUNTER FOR MEDICARE ANNUAL WELLNESS EXAM: ICD-10-CM

## 2018-12-27 DIAGNOSIS — I25.10 ATHEROSCLEROSIS OF NATIVE CORONARY ARTERY OF NATIVE HEART WITHOUT ANGINA PECTORIS: ICD-10-CM

## 2018-12-27 DIAGNOSIS — I25.10 CORONARY ATHEROSCLEROSIS: ICD-10-CM

## 2018-12-27 ASSESSMENT — MIFFLIN-ST. JEOR: SCORE: 1427.07

## 2018-12-28 LAB — HBA1C MFR BLD: 5.7 % (ref 3.5–6)

## 2019-01-03 ENCOUNTER — COMMUNICATION - HEALTHEAST (OUTPATIENT)
Dept: INTERNAL MEDICINE | Facility: CLINIC | Age: 74
End: 2019-01-03

## 2019-01-06 ENCOUNTER — COMMUNICATION - HEALTHEAST (OUTPATIENT)
Dept: INTERNAL MEDICINE | Facility: CLINIC | Age: 74
End: 2019-01-06

## 2019-01-23 ENCOUNTER — COMMUNICATION - HEALTHEAST (OUTPATIENT)
Dept: INTERNAL MEDICINE | Facility: CLINIC | Age: 74
End: 2019-01-23

## 2019-01-24 ENCOUNTER — COMMUNICATION - HEALTHEAST (OUTPATIENT)
Dept: INTERNAL MEDICINE | Facility: CLINIC | Age: 74
End: 2019-01-24

## 2019-01-28 ENCOUNTER — OFFICE VISIT (OUTPATIENT)
Dept: CARDIOLOGY CLINIC | Age: 74
End: 2019-01-28

## 2019-01-28 VITALS
HEIGHT: 67 IN | DIASTOLIC BLOOD PRESSURE: 76 MMHG | HEART RATE: 60 BPM | BODY MASS INDEX: 26.84 KG/M2 | SYSTOLIC BLOOD PRESSURE: 140 MMHG | WEIGHT: 171 LBS

## 2019-01-28 DIAGNOSIS — I10 BENIGN ESSENTIAL HTN: ICD-10-CM

## 2019-01-28 DIAGNOSIS — R93.1 HIGH CORONARY ARTERY CALCIUM SCORE: Primary | ICD-10-CM

## 2019-01-28 DIAGNOSIS — E78.5 HYPERLIPIDEMIA, UNSPECIFIED HYPERLIPIDEMIA TYPE: ICD-10-CM

## 2019-01-28 RX ORDER — ATORVASTATIN CALCIUM 40 MG/1
40 TABLET, FILM COATED ORAL DAILY
Qty: 180 TAB | Refills: 3 | Status: SHIPPED | OUTPATIENT
Start: 2019-01-28 | End: 2019-01-28 | Stop reason: SDUPTHER

## 2019-01-28 RX ORDER — ATORVASTATIN CALCIUM 40 MG/1
40 TABLET, FILM COATED ORAL DAILY
Qty: 180 TAB | Refills: 3 | Status: SHIPPED | OUTPATIENT
Start: 2019-01-28 | End: 2022-08-29 | Stop reason: SDUPTHER

## 2019-01-28 RX ORDER — OLMESARTAN MEDOXOMIL AND HYDROCHLOROTHIAZIDE 20/12.5 20; 12.5 MG/1; MG/1
1 TABLET ORAL DAILY
Qty: 90 TAB | Refills: 3 | Status: SHIPPED | OUTPATIENT
Start: 2019-01-28 | End: 2019-01-28 | Stop reason: SDUPTHER

## 2019-01-28 RX ORDER — ATENOLOL 50 MG/1
TABLET ORAL
Qty: 135 TAB | Refills: 3 | Status: SHIPPED | OUTPATIENT
Start: 2019-01-28 | End: 2019-01-28 | Stop reason: SDUPTHER

## 2019-01-28 RX ORDER — OLMESARTAN MEDOXOMIL AND HYDROCHLOROTHIAZIDE 20/12.5 20; 12.5 MG/1; MG/1
1 TABLET ORAL DAILY
Qty: 90 TAB | Refills: 3 | Status: SHIPPED | OUTPATIENT
Start: 2019-01-28 | End: 2022-02-15

## 2019-01-28 RX ORDER — LOSARTAN POTASSIUM AND HYDROCHLOROTHIAZIDE 12.5; 5 MG/1; MG/1
1 TABLET ORAL 2 TIMES DAILY
Qty: 180 TAB | Refills: 3 | Status: SHIPPED | OUTPATIENT
Start: 2019-01-28 | End: 2019-01-28

## 2019-01-28 RX ORDER — ATENOLOL 50 MG/1
TABLET ORAL
Qty: 135 TAB | Refills: 3 | Status: SHIPPED | OUTPATIENT
Start: 2019-01-28 | End: 2022-08-29 | Stop reason: SDUPTHER

## 2019-01-28 NOTE — LETTER
1/28/2019 4:52 PM 
 
Mr. Kaci Coyne [address] [id number] To Whom It May Concern: This is to confirm that Mr Kaci Coyne has been evaluated and treated by [facility] and myself. He is being treated for coronary artery disease and hypertension. He has been prescribed medications (atenolol, lipitor and an ARB) and is doing well on these. If any questions regarding this, please contact the office at . Sincerely, Tung Momin MD

## 2019-01-28 NOTE — LETTER
1/28/2019 4:42 PM 
 
Mr. Darnell Davis [address] [address] To Whom It May Concern: This is to confirm that Mr Darnell Davis has been evaluated and treated by [facility] and myself. He is being treated for atrial tachycardia and has been prescribed atenolol and appears to do well with this. If any questions regarding this, please contact the office at . Sincerely, Kath Dover MD

## 2019-06-20 ENCOUNTER — COMMUNICATION - HEALTHEAST (OUTPATIENT)
Dept: INTERNAL MEDICINE | Facility: CLINIC | Age: 74
End: 2019-06-20

## 2019-07-23 NOTE — PROGRESS NOTES
Perez Cobb MD. [facility]              Patient: Jacob Mckeon  : 1945      Today's Date: 2019            HISTORY OF PRESENT ILLNESS:     History of Present Illness:  Here for follow-up. Planning for trip to [place] [address].  No complaints. No CP or SOB. Exercises regularly. PAST MEDICAL HISTORY:     Past Medical History:   Diagnosis Date    Arm paresthesia, right     from rototor cuff injury    Benign essential HTN     Depression, major 1999    from divorce. on wellburtrin. failed prozac.  sees psych in [place] ED (erectile dysfunction)     GERD (gastroesophageal reflux disease)     High coronary artery calcium score     last stress test ;   CAC score 1400 in  per patient    History of bronchitis     Hyperlipidemia     Insomnia     Personal history of multiple pulmonary nodules     no further f/u needed. followed by serial CTs, last . 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  normal per pt in [address] Right          MEDICATIONS:     Current Outpatient Medications   Medication Sig Dispense Refill    metroNIDAZOLE (METROLOTION) 0.75 % lotion       atenolol (TENORMIN) 50 mg tablet Take 1 pill in AM and 1/2 pill in  Tab 1    atorvastatin (LIPITOR) 40 mg tablet Take 1 Tab by mouth daily. Takes 60 mg total 90 Tab 1    losartan-hydroCHLOROthiazide (HYZAAR) 50-12.5 mg per tablet Take 1 Tab by mouth two (2) times a day. 180 Tab 1    atorvastatin (LIPITOR) 20 mg tablet Take 1 Tab by mouth daily.  Takes 60 mg total 90 Tab 1    buPROPion XL (WELLBUTRIN XL) 300 mg XL tablet Psychiatry bedside.  Report to Arelis DAN.   Take 1 Tab by mouth every morning. 90 Tab 1    sildenafil citrate (VIAGRA) 50 mg tablet Take 1 Tab by mouth as needed. 8 Tab 3    Cholecalciferol, Vitamin D3, (VITAMIN D3) 2,000 unit cap capsule Take 2,000 Units by mouth daily. 30 Cap 11    aspirin delayed-release 81 mg tablet Take 1 Tab by mouth daily. 30 Tab 11     No Known Allergies        SOCIAL HISTORY:     Social History     Tobacco Use    Smoking status: Former Smoker     Last attempt to quit: 1989     Years since quittin.0    Smokeless tobacco: Never Used   Substance Use Topics    Alcohol use: Yes     Alcohol/week: 4.2 oz     Types: 7 Glasses of wine per week    Drug use: No         FAMILY HISTORY:     Family History   Problem Relation Age of Onset    Heart Disease Father     Hypertension Father     Heart Disease Brother             REVIEW OF SYMPTOMS:      Review of Symptoms:  Constitutional: Negative for fever, chills  HEENT: Negative for nosebleeds, tinnitus, and vision changes. Respiratory: Negative for cough, wheezing  Cardiovascular: Negative for orthopnea, claudication, syncope, and PND. Gastrointestinal: Negative for abdominal pain, melena. Genitourinary: Negative for dysuria  Musculoskeletal: Negative for myalgias. Skin: Negative for rash  Heme: No problems bleeding. Neurological: Negative for speech change and focal weakness.               PHYSICAL EXAM:      Physical Exam:  Visit Vitals  /76   Pulse 60   Ht 5' 7\" (1.702 m)   Wt 171 lb (77.6 kg)   BMI 26.78 kg/m²       Patient appears generally well, mood and affect are appropriate and pleasant. HEENT:  Hearing intact, non-icteric, normocephalic, atraumatic. Neck Exam: Supple, No JVD or carotid bruits. Lung Exam: Clear to auscultation, even breath sounds. Cardiac Exam: Regular rate and rhythm with 2/6 systolic murmur  Abdomen: Soft, non-tender, normal bowel sounds. No bruits or masses. Extremities: Moves all ext well. No lower extremity edema.   Vascular: 2+ dorsalis pedis pulses bilaterally. Psych: Appropriate affect  Neuro - Grossly intact           LABS / OTHER STUDIES:      Labs 12/17 - CMP OK, CBC OK, chol 126, TG 66, HDL 49, LDL 64           CARDIAC DIAGNOSTICS:      Cardiac Evaluation Includes:  EKG 3/2/18 - NSR, normal        Treadmill Stress test 3/5/18 - walked 10:55 (13.4 METS). Normal study     Echo 3/5/18 - LVEF 65%, mild-mod MAC and mild MR, AV sclerosis                ASSESSMENT AND PLAN:      Assessment and Plan:  1) Atherosclerosis   - CAC score 1400 in 2006 per patient.    - No prior CV event event. - Treadmill Stress test 3/5/18 - walked 10:55 (13.4 METS). Normal study   - Echo 3/5/18 - LVEF 65%, mild-mod MAC and mild MR, AV sclerosis   - Denies any anginal complaints -- works out regularly. 2) HTN  - BP goal < 130/80   - BP OK at home   - continue current meds      3) Dyslipidemia  - prior lipids OK (followed by PCP)   - cont statin      4) PCP is in [place]. and will establish later      5) See me back in one year. Patient expressed understanding of the plan - questions were answered. Originally from [place]. 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 Emmanuel Santana MD, [address]     [address]  [address] Sarah Nenaamanda[address]  Ph: [phone]                               Ph [phone]

## 2019-08-10 ENCOUNTER — COMMUNICATION - HEALTHEAST (OUTPATIENT)
Dept: INTERNAL MEDICINE | Facility: CLINIC | Age: 74
End: 2019-08-10

## 2019-08-13 ENCOUNTER — RECORDS - HEALTHEAST (OUTPATIENT)
Dept: ADMINISTRATIVE | Facility: OTHER | Age: 74
End: 2019-08-13

## 2019-08-14 ENCOUNTER — AMBULATORY - HEALTHEAST (OUTPATIENT)
Dept: INTERNAL MEDICINE | Facility: CLINIC | Age: 74
End: 2019-08-14

## 2019-08-14 DIAGNOSIS — R73.9 HYPERGLYCEMIA: ICD-10-CM

## 2019-08-14 DIAGNOSIS — R97.20 ELEVATED PROSTATE SPECIFIC ANTIGEN (PSA): ICD-10-CM

## 2019-08-27 ENCOUNTER — RECORDS - HEALTHEAST (OUTPATIENT)
Dept: ADMINISTRATIVE | Facility: OTHER | Age: 74
End: 2019-08-27

## 2019-08-28 ENCOUNTER — COMMUNICATION - HEALTHEAST (OUTPATIENT)
Dept: INTERNAL MEDICINE | Facility: CLINIC | Age: 74
End: 2019-08-28

## 2019-08-28 DIAGNOSIS — N52.9 ED (ERECTILE DYSFUNCTION): ICD-10-CM

## 2019-08-28 DIAGNOSIS — R21 RASH AND NONSPECIFIC SKIN ERUPTION: ICD-10-CM

## 2020-02-11 ENCOUNTER — COMMUNICATION - HEALTHEAST (OUTPATIENT)
Dept: INTERNAL MEDICINE | Facility: CLINIC | Age: 75
End: 2020-02-11

## 2020-02-26 ENCOUNTER — AMBULATORY - HEALTHEAST (OUTPATIENT)
Dept: INTERNAL MEDICINE | Facility: CLINIC | Age: 75
End: 2020-02-26

## 2020-02-26 DIAGNOSIS — L98.9 SKIN LESION: ICD-10-CM

## 2020-03-04 ENCOUNTER — OFFICE VISIT - HEALTHEAST (OUTPATIENT)
Dept: INTERNAL MEDICINE | Facility: CLINIC | Age: 75
End: 2020-03-04

## 2020-03-04 DIAGNOSIS — I25.10 CORONARY ATHEROSCLEROSIS: ICD-10-CM

## 2020-03-04 DIAGNOSIS — K21.9 GASTROESOPHAGEAL REFLUX DISEASE WITHOUT ESOPHAGITIS: ICD-10-CM

## 2020-03-04 DIAGNOSIS — L71.9 ROSACEA: ICD-10-CM

## 2020-03-04 DIAGNOSIS — Z12.5 ENCOUNTER FOR SCREENING FOR MALIGNANT NEOPLASM OF PROSTATE: ICD-10-CM

## 2020-03-04 DIAGNOSIS — N52.9 ERECTILE DYSFUNCTION, UNSPECIFIED ERECTILE DYSFUNCTION TYPE: ICD-10-CM

## 2020-03-04 DIAGNOSIS — F33.9 MAJOR DEPRESSION, RECURRENT, CHRONIC (H): ICD-10-CM

## 2020-03-04 DIAGNOSIS — R73.9 HYPERGLYCEMIA: ICD-10-CM

## 2020-03-04 DIAGNOSIS — Z00.00 ENCOUNTER FOR MEDICARE ANNUAL WELLNESS EXAM: ICD-10-CM

## 2020-03-04 DIAGNOSIS — I10 ESSENTIAL HYPERTENSION, BENIGN: ICD-10-CM

## 2020-03-04 DIAGNOSIS — F51.02 ADJUSTMENT INSOMNIA: ICD-10-CM

## 2020-03-04 LAB
ALBUMIN SERPL-MCNC: 4.2 G/DL (ref 3.5–5)
ALBUMIN UR-MCNC: NEGATIVE MG/DL
ALP SERPL-CCNC: 35 U/L (ref 45–120)
ALT SERPL W P-5'-P-CCNC: 31 U/L (ref 0–45)
ANION GAP SERPL CALCULATED.3IONS-SCNC: 11 MMOL/L (ref 5–18)
APPEARANCE UR: CLEAR
AST SERPL W P-5'-P-CCNC: 24 U/L (ref 0–40)
BILIRUB SERPL-MCNC: 0.6 MG/DL (ref 0–1)
BILIRUB UR QL STRIP: NEGATIVE
BUN SERPL-MCNC: 17 MG/DL (ref 8–28)
CALCIUM SERPL-MCNC: 10 MG/DL (ref 8.5–10.5)
CHLORIDE BLD-SCNC: 101 MMOL/L (ref 98–107)
CHOLEST SERPL-MCNC: 145 MG/DL
CO2 SERPL-SCNC: 27 MMOL/L (ref 22–31)
COLOR UR AUTO: YELLOW
CREAT SERPL-MCNC: 1.07 MG/DL (ref 0.7–1.3)
ERYTHROCYTE [DISTWIDTH] IN BLOOD BY AUTOMATED COUNT: 11.7 % (ref 11–14.5)
FASTING STATUS PATIENT QL REPORTED: YES
GFR SERPL CREATININE-BSD FRML MDRD: >60 ML/MIN/1.73M2
GLUCOSE BLD-MCNC: 102 MG/DL (ref 70–125)
GLUCOSE UR STRIP-MCNC: NEGATIVE MG/DL
HBA1C MFR BLD: 6 % (ref 3.5–6)
HCT VFR BLD AUTO: 43.5 % (ref 40–54)
HDLC SERPL-MCNC: 58 MG/DL
HGB BLD-MCNC: 14.9 G/DL (ref 14–18)
HGB UR QL STRIP: NEGATIVE
KETONES UR STRIP-MCNC: NEGATIVE MG/DL
LDLC SERPL CALC-MCNC: 74 MG/DL
LEUKOCYTE ESTERASE UR QL STRIP: NEGATIVE
MCH RBC QN AUTO: 33.5 PG (ref 27–34)
MCHC RBC AUTO-ENTMCNC: 34.3 G/DL (ref 32–36)
MCV RBC AUTO: 98 FL (ref 80–100)
NITRATE UR QL: NEGATIVE
PH UR STRIP: 7 [PH] (ref 5–8)
PLATELET # BLD AUTO: 245 THOU/UL (ref 140–440)
PMV BLD AUTO: 6.8 FL (ref 7–10)
POTASSIUM BLD-SCNC: 4.1 MMOL/L (ref 3.5–5)
PROT SERPL-MCNC: 6.8 G/DL (ref 6–8)
PSA SERPL-MCNC: 2.1 NG/ML (ref 0–6.5)
RBC # BLD AUTO: 4.46 MILL/UL (ref 4.4–6.2)
SODIUM SERPL-SCNC: 139 MMOL/L (ref 136–145)
SP GR UR STRIP: 1.02 (ref 1–1.03)
TRIGL SERPL-MCNC: 64 MG/DL
TSH SERPL DL<=0.005 MIU/L-ACNC: 1.7 UIU/ML (ref 0.3–5)
UROBILINOGEN UR STRIP-ACNC: NORMAL
WBC: 5.7 THOU/UL (ref 4–11)

## 2020-03-04 ASSESSMENT — ANXIETY QUESTIONNAIRES
1. FEELING NERVOUS, ANXIOUS, OR ON EDGE: NOT AT ALL
2. NOT BEING ABLE TO STOP OR CONTROL WORRYING: NOT AT ALL

## 2020-03-04 ASSESSMENT — MIFFLIN-ST. JEOR: SCORE: 1410.05

## 2020-03-05 ENCOUNTER — COMMUNICATION - HEALTHEAST (OUTPATIENT)
Dept: INTERNAL MEDICINE | Facility: CLINIC | Age: 75
End: 2020-03-05

## 2021-05-31 VITALS — HEIGHT: 67 IN | BODY MASS INDEX: 25.9 KG/M2 | WEIGHT: 165 LBS

## 2021-05-31 NOTE — TELEPHONE ENCOUNTER
Ok to enter lab orders for you to sign? Anything else to add? Thank you.    Ashley Espinoza, CMA

## 2021-05-31 NOTE — TELEPHONE ENCOUNTER
Please write out orders for diagnostic PSA.  Diagnosis is elevated PSA.  Also for fasting blood sugar and hemoglobin A1c.  Diagnosis is hyperglycemia. I will sign.   Thank you.   please forward to his preferred laboratory.

## 2021-05-31 NOTE — TELEPHONE ENCOUNTER
Refill Approved    Rx's renewed per Medication Renewal Policy. Medications last renewed on 12/27/2018.  Last OV 12/27/2018.    Debra Woods, [facility] Triage/Med Refill 8/29/2019     Requested Prescriptions   Pending Prescriptions Disp Refills     metroNIDAZOLE (METROLOTION) 0.75 % Lotn [Pharmacy Med Name: metroNIDAZOLE 0.75% LOTION]  0     Sig: APPLY ONE THIN LAYER TOPICALLY EVERY NIGHT AT BEDTIME       Topical Dermatology Medications Refill Protocol Passed - 8/28/2019  8:48 AM        Passed - Patient has had office visit/physical in last 1 year     Last office visit with prescriber/PCP: 11/5/2015 Juan Mayer MD OR yolanda dept: Visit date not found OR same specialty: 11/5/2015 Juan Mayer MD  Last physical: 12/27/2018 Last MTM visit: Visit date not found   Next visit within 3 mo: Visit date not found  Next physical within 3 mo: Visit date not found  Prescriber OR PCP: Juan Mayer MD  Last diagnosis associated with med order: There are no diagnoses linked to this encounter.  If protocol passes may refill for 12 months if within 3 months of last provider visit (or a total of 15 months).             VIAGRA 100 mg tablet [Pharmacy Med Name: VIAGRA 100 MG TABLET] 6 tablet PRN     Sig: TAKE ONE TABLET BY MOUTH AS NEEDED FOR ERECTILE DYSFUNCTION       Medications for Impotence Refill Protocol Passed - 8/28/2019  8:48 AM        Passed - PCP or prescribing provider visit in last year     Last office visit with prescriber/PCP: 11/5/2015 Juan Mayer MD OR yolanda dept: Visit date not found OR same specialty: 11/5/2015 Juan Mayer MD  Last physical: 12/27/2018 Last MTM visit: Visit date not found   Next visit within 3 mo: Visit date not found  Next physical within 3 mo: Visit date not found  Prescriber OR PCP: Juan Mayer MD  Last diagnosis associated with med order: There are no diagnoses linked to this encounter.  If protocol passes may refill for 12 months if within 3 months of last provider  visit (or a total of 15 months).

## 2021-06-02 VITALS — HEIGHT: 67 IN | WEIGHT: 165 LBS | BODY MASS INDEX: 25.9 KG/M2

## 2021-06-04 VITALS
SYSTOLIC BLOOD PRESSURE: 122 MMHG | HEART RATE: 62 BPM | WEIGHT: 163 LBS | HEIGHT: 67 IN | OXYGEN SATURATION: 97 % | BODY MASS INDEX: 25.58 KG/M2 | DIASTOLIC BLOOD PRESSURE: 62 MMHG

## 2021-06-06 NOTE — PROGRESS NOTES
Assessment and Plan:   1. Encounter for Medicare annual wellness exam  Need to clarify his Pneumovax.  He has had at least one.  He should have a second if he has not had 2 since turning 63 or in his life.  He is up-to-date on his flu shot.  I have urged shingles vaccination through his pharmacy.  Continue healthy diet and activity.  - PSA, Annual Screen (Prostatic-Specific Antigen)    2. Essential hypertension, benign  Blood pressure well controlled.  Continue same.  - losartan-hydrochlorothiazide (HYZAAR) 50-12.5 mg per tablet; TAKE ONE TABLET BY MOUTH TWICE A DAY **ONE AT 6AM AND ONE AT 4PM**  Dispense: 180 tablet; Refill: 3  - atenoloL (TENORMIN) 50 MG tablet; Take 1 tablet (50 mg total) by mouth every morning.  Dispense: 90 tablet; Refill: 3  - atenoloL (TENORMIN) 25 MG tablet; Take one tablet in the evening.  Dispense: 90 tablet; Refill: 3  - buPROPion (WELLBUTRIN XL) 300 MG 24 hr tablet; Take 1 tablet (300 mg total) by mouth daily.  Dispense: 90 tablet; Refill: 3  - Comprehensive Metabolic Panel  - Urinalysis-UC if Indicated    3. Coronary atherosclerosis  Asymptomatic coronary artery disease.  Continue high intensity statin therapy.  - atorvastatin (LIPITOR) 40 MG tablet; TAKE ONE TABLET BY MOUTH DAILY  Dispense: 90 tablet; Refill: 3  - atorvastatin (LIPITOR) 20 MG tablet; TAKE ONE TABLET BY MOUTH DAILY WITH 40MG TABLET FOR A TOTAL OF 60MG PER DAY  Dispense: 90 tablet; Refill: 3  - Lipid Cascade  - HM2(CBC w/o Differential)    4. Major depression, recurrent, chronic (H)  Mood is actually fairly good despite his current situation.  Continue medications.  [place] sure if he still follows with her.  - Thyroid [place]    5. Adjustment insomnia  He would like to have a few Valium on hand for sleep as needed especially with his wife's illness.  - diazePAM (VALIUM) 5 MG tablet; Take 1 tablet (5 mg total) by mouth at bedtime as needed for anxiety.  Dispense: 20 tablet; Refill:  0    6. Gastroesophageal reflux disease without esophagitis  Not an issue at this time.  Continue regimen.  - HM2(CBC w/o Differential)    7. Erectile dysfunction, unspecified erectile dysfunction type  Good control with Viagra.  Continue same.  - sildenafiL (VIAGRA) 100 MG tablet; Take 1 tablet (100 mg total) by mouth daily as needed for erectile dysfunction.  Dispense: 30 tablet; Refill: 3    8. Rosacea  Stable.  Continue MetroLotion.  - metroNIDAZOLE (METROLOTION) 0.75 % Lotn; Apply 1 application topically at bedtime.  Dispense: 1 Bottle; Refill: 3    9. Encounter for screening for malignant neoplasm of prostate     - PSA, Annual Screen (Prostatic-Specific Antigen)    10. Hyperglycemia  Repeat A1c.  Sugars have remained in the prediabetic range.  - Glycosylated Hemoglobin A1c        The patient's current medical problems were reviewed.    I have had an Advance Directives discussion with the patient.  The following health maintenance schedule was reviewed with the patient and provided in printed form in the after visit summary:   Health Maintenance   Topic Date Due     DEPRESSION ACTION PLAN  1945     ZOSTER VACCINES (2 of 2) 12/31/2015     INFLUENZA VACCINE RULE BASED (1) 08/01/2019     MEDICARE ANNUAL WELLNESS VISIT  12/27/2019     FALL RISK ASSESSMENT  12/27/2019     COLONOSCOPY  04/25/2022     LIPID  12/18/2022     ADVANCE CARE PLANNING  12/27/2023     TD 18+ HE  11/05/2025     PNEUMOCOCCAL IMMUNIZATION 65+ LOW/MEDIUM RISK  Completed        Subjective:   Chief Complaint: Vinod Clifton is an 74 y.o. male here for an Annual Wellness visit.   HPI: Either comes in today for an annual wellness.  He has come back to the states from the [place] early because his wife has metastatic pancreatic cancer.  She is currently undergoing chemotherapy at the [facility].  They are living in an apartment [facility] in the [facility].  They still have their home in [place] and will return there for hospice  care.  He is planning on going back to [facility] after his wife passes.  He is very realistic about what this means for he and his wife.  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  Apparently she does not wish to prolong things much.  He is trying to go about his life as best he can.  They are still trying to go out and eat and enjoy life as much as they can.  Physically he is doing okay.  He denies any somatic concerns for me.  He does have a physician out in [place] whom he has seen.  He brings in lab work which all looked good.    Review of Systems:    Please see above.  The rest of the review of systems are negative for all systems.    Patient Care Team:  Juan Mayer MD as PCP - General (Internal Medicine)  Juan Mayer MD as Assigned PCP     Patient Active Problem List   Diagnosis     Overweight     GERD (gastroesophageal reflux disease)     Benign Essential Hypertension     Coronary Artery Disease     Insomnia     Cough     Major depression, recurrent, chronic (H)     No past medical history on file.   No past surgical history on file.   No family history on file.   Social History     Socioeconomic History     Marital status:      Spouse name: Not on file     Number of children: Not on file     Years of education: Not on file     Highest education level: Not on file   Occupational History     Not on file   Social Needs     Financial resource strain: Not on file     Food insecurity:     Worry: Not on file     Inability: Not on file     Transportation needs:     Medical: Not on file     Non-medical: Not on file   Tobacco Use     Smoking status: Former Smoker     Smokeless tobacco: Never Used   Substance and Sexual Activity     Alcohol use: Not on file     Drug use: Not on file     Sexual activity: Not on file   Lifestyle     Physical activity:     Days per week: Not on file     Minutes per session: Not on file      Stress: Not on file   Relationships     Social connections:     Talks on phone: Not on file     Gets together: Not on file     Attends Synagogue service: Not on file     Active member of club or organization: Not on file     Attends meetings of clubs or organizations: Not on file     Relationship status: Not on file     Intimate partner violence:     Fear of current or ex partner: Not on file     Emotionally abused: Not on file     Physically abused: Not on file     Forced sexual activity: Not on file   Other Topics Concern     Not on file   Social History Narrative     Not on file      Current Outpatient Medications   Medication Sig Dispense Refill     aspirin 81 MG EC tablet Take 1 tablet (81 mg total) by mouth daily. 90 tablet 3     atenoloL (TENORMIN) 25 MG tablet Take one tablet in the evening. 90 tablet 3     atenoloL (TENORMIN) 50 MG tablet Take 1 tablet (50 mg total) by mouth every morning. 90 tablet 3     atorvastatin (LIPITOR) 20 MG tablet TAKE ONE TABLET BY MOUTH DAILY WITH 40MG TABLET FOR A TOTAL OF 60MG PER DAY 90 tablet 3     atorvastatin (LIPITOR) 40 MG tablet TAKE ONE TABLET BY MOUTH DAILY 90 tablet 3     buPROPion (WELLBUTRIN XL) 300 MG 24 hr tablet Take 1 tablet (300 mg total) by mouth daily. 90 tablet 3     cholecalciferol, vitamin D3, (VITAMIN D3) 2,000 unit Tab Take 1 tablet (2,000 Units total) by mouth daily. 90 tablet 3     diazePAM (VALIUM) 5 MG tablet Take 1 tablet (5 mg total) by mouth at bedtime as needed for anxiety. 20 tablet 0     losartan-hydrochlorothiazide (HYZAAR) 50-12.5 mg per tablet TAKE ONE TABLET BY MOUTH TWICE A DAY **ONE AT 6AM AND ONE AT 4PM** 180 tablet 3     metroNIDAZOLE (METROLOTION) 0.75 % Lotn Apply 1 application topically at bedtime. 1 Bottle 3     sildenafiL (VIAGRA) 100 MG tablet Take 1 tablet (100 mg total) by mouth daily as needed for erectile dysfunction. 30 tablet 3     No current facility-administered medications for this visit.       Objective:   Vital Signs:  "  Visit Vitals  /62 (Patient Site: Right Arm, Patient Position: Sitting, Cuff Size: Adult Regular)   Pulse 62   Ht 5' 6.5\" (1.689 m)   Wt 163 lb (73.9 kg)   SpO2 97%   BMI 25.91 kg/m         VisionScreening:  No exam data present     PHYSICAL EXAM  /62 (Patient Site: Right Arm, Patient Position: Sitting, Cuff Size: Adult Regular)   Pulse 62   Ht 5' 6.5\" (1.689 m)   Wt 163 lb (73.9 kg)   SpO2 97%   BMI 25.91 kg/m      General Appearance:    Alert, cooperative, no distress, appears stated age   Head:    Normocephalic, without obvious abnormality, atraumatic   Eyes:    PERRL, conjunctiva/corneas clear, EOM's intact       Ears:    Normal TM's and external ear canals, both ears   Nose:   Nares normal, septum midline, mucosa normal, no drainage    or sinus tenderness   Throat:   Lips, mucosa, and tongue normal; teeth and gums normal   Neck:   Supple, symmetrical, trachea midline, no adenopathy;        thyroid:  No enlargement/tenderness/nodules; no carotid    bruit or JVD   Back:     Symmetric, no curvature, ROM normal, no CVA tenderness   Lungs:     Clear to auscultation bilaterally, respirations unlabored   Chest wall:    No tenderness or deformity   Heart:    Regular rate and rhythm, S1 and S2 normal, no murmur, rub    or gallop   Abdomen:     Soft, non-tender, bowel sounds active all four quadrants,     no masses, no organomegaly   Genitalia:    Normal male without lesion, discharge or tenderness   Rectal:    Normal tone, normal prostate, no masses or tenderness;    guaiac negative stool   Extremities:   Extremities normal, atraumatic, no cyanosis or edema   Pulses:   2+ and symmetric all extremities   Skin:   Skin color, texture, turgor normal, no rashes or lesions   Lymph nodes:   Cervical, supraclavicular nodes normal   Neurologic:   CNII-XII intact. Normal strength, sensation and reflexes       throughout       Assessment Results 3/4/2020   Activities of Daily Living No help needed   Instrumental " Activities of Daily Living No help needed   Get Up and Go Score Less than 12 seconds   Mini Cog Total Score 5   Some recent data might be hidden     A Mini-Cog score of 0-2 suggests the possibility of dementia, score of 3-5 suggests no dementia    Identified Health Risks:     He is at risk for lack of exercise and has been provided with information to increase physical activity for the benefit of his well-being.  The patient was provided with written information regarding signs of hearing loss.  Patient's advanced directive was discussed and I am comfortable with the patient's wishes.

## 2021-06-14 NOTE — PROGRESS NOTES
Assessment and Plan:       1. Encounter for Medicare annual wellness exam  Patient was a very healthy lifestyle.  He will probably be establishing care with someone in [place].  I wished him best of luck.  He is always welcome to return however.    2. Essential hypertension, benign  Good control.  Continue same.  He is taking 50 in the morning and 20 5 in the evening of his height of his atenolol.  - atenolol (TENORMIN) 25 MG tablet; Take one tablet in the evening.  Dispense: 90 tablet; Refill: 3    3. Coronary atherosclerosis  Continue current regimen.  He is on 60 of atorvastatin.  Asymptomatic.  Very active.  - atorvastatin (LIPITOR) 20 MG tablet; TAKE ONE TABLET BY MOUTH DAILY WITH 40MG TABLET FOR A TOTAL OF 60MG PER DAY  Dispense: 90 tablet; Refill: 3    4. Screen for colon cancer  He did have his colonoscopy this year.  We will try to get those results.    5. Insomnia  Chronic and stable.  Continue current regimen per he understands the risks with Ambien.  He uses sparingly.    6. Gastroesophageal reflux disease without esophagitis  Recent EGD was apparently normal.  Continue same regimen.      The patient's current medical problems were reviewed.    I have had an Advance Directives discussion with the patient.  The following health maintenance schedule was reviewed with the patient and provided in printed form in the after visit summary:   Health Maintenance   Topic Date Due     PNEUMOCOCCAL POLYSACCHARIDE VACCINE AGE 65 AND OVER  04/20/2008     COLONOSCOPY  12/18/2018 (Originally 2/12/2015)     FALL RISK ASSESSMENT  12/18/2018     ADVANCE DIRECTIVES DISCUSSED WITH PATIENT  12/18/2022     TD 18+ HE  11/05/2025     INFLUENZA VACCINE RULE BASED  Completed     PNEUMOCOCCAL CONJUGATE VACCINE FOR ADULTS (PCV13 OR PREVNAR)  Completed     ZOSTER VACCINE  Addressed        Subjective:   Chief Complaint: Vinod Clifton is an 72 y.o. male here for an Annual Wellness visit.   HPI: Vinod comes in today for  physical.  Reports his been doing well.  He was having some GI issues after returning from a trip abroad and had an EGD and colon.  He is doing better from that standpoint.  He is living in [place].  He tried to establish care with a physician out there but apparently that did not do go well.  He may be seeing his wife's new physician.  He has a history of coronary disease.  No chest pains or shortness of breath.  He feels well.  He needs refills of his medications today.    Review of Systems:    Please see above.  The rest of the review of systems are negative for all systems.    Patient Care Team:  Juan Mayer MD as PCP - General     Patient Active Problem List   Diagnosis     Overweight     GERD (gastroesophageal reflux disease)     Benign Essential Hypertension     Coronary Artery Disease     Insomnia     Cough     No past medical history on file.   No past surgical history on file.   No family history on file.   Social History     Social History     Marital status:      Spouse name: N/A     Number of children: N/A     Years of education: N/A     Occupational History     Not on file.     Social History Main Topics     Smoking status: Former Smoker     Smokeless tobacco: Never Used     Alcohol use Not on file     Drug use: Not on file     Sexual activity: Not on file     Other Topics Concern     Not on file     Social History Narrative      Current Outpatient Prescriptions   Medication Sig Dispense Refill     aspirin 81 MG EC tablet Take 81 mg by mouth daily.       atenolol (TENORMIN) 25 MG tablet Take one tablet in the evening. 90 tablet 3     atenolol (TENORMIN) 50 MG tablet Take 1 tablet (50 mg total) by mouth every morning. 90 tablet 3     atorvastatin (LIPITOR) 20 MG tablet TAKE ONE TABLET BY MOUTH DAILY WITH 40MG TABLET FOR A TOTAL OF 60MG PER DAY 90 tablet 3     atorvastatin (LIPITOR) 40 MG tablet TAKE ONE TABLET BY MOUTH DAILY 90 tablet 3     buPROPion (WELLBUTRIN XL) 300 MG 24 hr tablet         "losartan-hydrochlorothiazide (HYZAAR) 50-12.5 mg per tablet TAKE ONE TABLET BY MOUTH TWICE A DAY **ONE AT 6AM AND ONE AT 4PM** 180 tablet 3     metroNIDAZOLE (METROLOTION) 0.75 % Lotn Apply 1 application topically at bedtime. 1 Bottle 1     sildenafil (VIAGRA) 100 MG tablet Take 100 mg by mouth daily as needed for erectile dysfunction.       zolpidem (AMBIEN) 5 MG tablet Take 1 tablet (5 mg total) by mouth bedtime as needed for sleep. 90 tablet 3     sildenafil (VIAGRA) 100 MG tablet Take 1 tablet (100 mg total) by mouth as needed for erectile dysfunction. 6 tablet prn     zolpidem (AMBIEN) 5 MG tablet Take 1 tablet (5 mg total) by mouth at bedtime as needed for sleep. 30 tablet 1     No current facility-administered medications for this visit.       Objective:   Vital Signs:   Visit Vitals     /58 (Patient Site: Right Arm, Patient Position: Sitting, Cuff Size: Adult Regular)     Pulse 68     Ht 5' 7\" (1.702 m)     Wt 165 lb (74.8 kg)     SpO2 98%     BMI 25.84 kg/m2        VisionScreening:  No exam data present     PHYSICAL EXAM  /58 (Patient Site: Right Arm, Patient Position: Sitting, Cuff Size: Adult Regular)  Pulse 68  Ht 5' 7\" (1.702 m)  Wt 165 lb (74.8 kg)  SpO2 98%  BMI 25.84 kg/m2    General Appearance:    Alert, cooperative, no distress, appears stated age   Head:    Normocephalic, without obvious abnormality, atraumatic   Eyes:    PERRL, conjunctiva/corneas clear, EOM's intact   Ears:    Normal TM's and external ear canals, both ears   Nose:   Nares normal, septum midline, mucosa normal, no drainage    or sinus tenderness   Throat:   Lips, mucosa, and tongue normal; teeth and gums normal   Neck:   Supple, symmetrical, trachea midline, no adenopathy;        thyroid:  No enlargement/tenderness/nodules; no carotid    bruit or JVD   Back:     Symmetric, no curvature, ROM normal, no CVA tenderness   Lungs:     Clear to auscultation bilaterally, respirations unlabored   Chest wall:    No " tenderness or deformity   Heart:    Regular rate and rhythm, S1 and S2 normal, no murmur, rub    or gallop   Abdomen:     Soft, non-tender, bowel sounds active all four quadrants,     no masses, no organomegaly   Genitalia:    Normal male without lesion, discharge or tenderness   Rectal:    Normal tone, normal prostate, no masses or tenderness;    guaiac negative stool   Extremities:   Extremities normal, atraumatic, no cyanosis or edema   Pulses:   2+ and symmetric all extremities   Skin:   Skin color, texture, turgor normal, no rashes or lesions   Lymph nodes:   Cervical, supraclavicular, and axillary nodes normal   Neurologic:   CNII-XII intact. Normal strength, sensation and reflexes       throughout       Assessment Results 12/18/2017   Activities of Daily Living No help needed   Instrumental Activities of Daily Living No help needed   Get Up and Go Score Less than 12 seconds   Mini Cog Total Score 5   Some recent data might be hidden     A Mini-Cog score of 0-2 suggests the possibility of dementia, score of 3-5 suggests no dementia    Identified Health Risks:     The patient was provided with written information regarding signs of hearing loss.  Patient's advanced directive was discussed and I am comfortable with the patient's wishes.

## 2021-06-16 NOTE — TELEPHONE ENCOUNTER
Telephone Encounter by Delmy Reyes CMA at 9/10/2019  8:38 AM     Author: Delmy Reyes CMA Service: -- Author Type: Medical Assistant    Filed: 9/10/2019  8:38 AM Encounter Date: 8/10/2019 Status: Signed    : Delmy Reyes CMA (Medical Assistant)       Per the following message from Dr. Mayer, he has been removed as the patient's PCP:  Juan Mayer MD [place], [place] Care Team [place] 15 hours ago (5:24 PM)      Please remove my name as PCP as he has changed physicians.      Delmy LOYA CMA/THAO....................8:38 AM

## 2021-06-18 NOTE — PATIENT INSTRUCTIONS - HE
Patient Instructions by Juan Mayer MD at 3/4/2020  8:40 AM     Author: Juan Mayer MD Service: -- Author Type: Physician    Filed: 3/4/2020  9:53 AM Encounter Date: 3/4/2020 Status: Signed    : Juan Mayer MD (Physician)         Patient Education     Exercise for a Healthier Heart  You may wonder how you can improve the health of your heart. If youre thinking about exercise, youre on the right track. You dont need to become an athlete, but you do need a certain amount of brisk exercise to help strengthen your heart. If you have been diagnosed with a heart condition, your doctor may recommend exercise to help stabilize your condition. To help make exercise a habit, choose safe, fun activities.       Be sure to check with your health care provider before starting an exercise program.    Why exercise?  Exercising regularly offers many healthy rewards. It can help you do all of the following:    Improve your blood cholesterol levels to help prevent further heart trouble    Lower your blood pressure to help prevent a stroke or heart attack    Control diabetes, or reduce your risk of getting this disease    Improve your heart and lung function    Reach and maintain a healthy weight    Make your muscles stronger and more limber so you can stay active    Prevent falls and fractures by slowing the loss of bone mass (osteoporosis)    Manage stress better  Exercise tips  Ease into your routine. Set small goals. Then build on them.  Exercise on most days. Aim for a total of 150 or more minutes of moderate to  vigorous intensity activity each week. Consider 40 minutes, 3 to 4 times a week. For best results, activity should last for 40 minutes on average. It is OK to work up to the 40 minute period over time. Examples of moderate-intensity activity is walking one mile in 15 minutes or 30 to 45 minutes of yard work.  Step up your daily activity level. Along with your exercise program, try being more active  throughout the day. Walk instead of drive. Do more household tasks or yard work.  Choose one or more activities you enjoy. Walking is one of the easiest things you can do. You can also try swimming, riding a bike, or taking an exercise class.  Stop exercising and call your doctor if you:    Have chest pain or feel dizzy or lightheaded    Feel burning, tightness, pressure, or heaviness in your chest, neck, shoulders, back, or arms    Have unusual shortness of breath    Have increased joint or muscle pain    Have palpitations or an irregular heartbeat      [id number] [employer]. [address] [place] [id number]. All rights reserved. This information is not intended as a substitute for professional medical care. Always follow your healthcare professional's instructions.           Advance Directive  Patients advance directive was discussed and I am comfortable with the patients wishes.  Patient Education   Personalized Prevention Plan  You are due for the preventive services outlined below.  Your care team is available to assist you in scheduling these services.  If you have already completed any of these items, please share that information with your care team to update in your medical record.  Health Maintenance   Topic Date Due   ? DEPRESSION ACTION PLAN  1945   ? ZOSTER VACCINES (2 of 2) 12/31/2015   ? INFLUENZA VACCINE RULE BASED (1) 08/01/2019   ? MEDICARE ANNUAL WELLNESS VISIT  12/27/2019   ? FALL RISK ASSESSMENT  12/27/2019   ? COLONOSCOPY  04/25/2022   ? LIPID  12/18/2022   ? ADVANCE CARE PLANNING  12/27/2023   ? TD 18+ HE  11/05/2025   ? PNEUMOCOCCAL IMMUNIZATION 65+ LOW/MEDIUM RISK  Completed

## 2021-06-18 NOTE — LETTER
Letter by Juan Mayer MD at      Author: Juan Mayer MD Service: -- Author Type: --    Filed:  Encounter Date: 1/3/2019 Status: (Other)       Vinod Clifton  [address] [facility] [address]             January 3, 2019         Dear Mr. Clifton,    Below are the results from your recent visit:    Resulted Orders   Glycosylated Hemoglobin A1c   Result Value Ref Range    Hemoglobin A1c 5.7 3.5 - 6.0 %       Average sugar looks fine.     Please call with questions or contact us using [place].    Sincerely,        Electronically signed by Juan Mayer MD

## 2021-06-20 ENCOUNTER — HEALTH MAINTENANCE LETTER (OUTPATIENT)
Age: 76
End: 2021-06-20

## 2021-06-20 NOTE — LETTER
Letter by Juan Mayer MD at      Author: Juan Mayer MD Service: -- Author Type: --    Filed:  Encounter Date: 3/5/2020 Status: (Other)         Vinod Clifton  [address] [facility] [address]             March 5, 2020         Dear Mr. Clifton,    Below are the results from your recent visit:    Resulted Orders   Lipid Cascade   Result Value Ref Range    Cholesterol 145 <=199 mg/dL    Triglycerides 64 <=149 mg/dL    HDL Cholesterol 58 >=40 mg/dL    LDL Calculated 74 <=129 mg/dL    Patient Fasting > 8hrs? Yes    Comprehensive Metabolic Panel   Result Value Ref Range    Sodium 139 136 - 145 mmol/L    Potassium 4.1 3.5 - 5.0 mmol/L    Chloride 101 98 - 107 mmol/L    CO2 27 22 - 31 mmol/L    Anion Gap, Calculation 11 5 - 18 mmol/L    Glucose 102 70 - 125 mg/dL    BUN 17 8 - 28 mg/dL    Creatinine 1.07 0.70 - 1.30 mg/dL    GFR MDRD Af Amer >60 >60 mL/min/1.73m2    GFR MDRD Non Af Amer >60 >60 mL/min/1.73m2    Bilirubin, Total 0.6 0.0 - 1.0 mg/dL    Calcium 10.0 8.5 - 10.5 mg/dL    Protein, Total 6.8 6.0 - 8.0 g/dL    Albumin 4.2 3.5 - 5.0 g/dL    Alkaline Phosphatase 35 (L) 45 - 120 U/L    AST 24 0 - 40 U/L    ALT 31 0 - 45 U/L    Narrative    Fasting Glucose reference range is 70-99 mg/dL per  American Diabetes Association (ADA) guidelines.   HM2(CBC w/o Differential)   Result Value Ref Range    WBC 5.7 4.0 - 11.0 thou/uL    RBC 4.46 4.40 - 6.20 mill/uL    Hemoglobin 14.9 14.0 - 18.0 g/dL    Hematocrit 43.5 40.0 - 54.0 %    MCV 98 80 - 100 fL    MCH 33.5 27.0 - 34.0 pg    MCHC 34.3 32.0 - 36.0 g/dL    RDW 11.7 11.0 - 14.5 %    Platelets 245 140 - 440 thou/uL    MPV 6.8 (L) 7.0 - 10.0 fL   Urinalysis-UC if Indicated   Result Value Ref Range    Color, UA Yellow Colorless, Yellow, Straw, Light Yellow    Clarity, UA Clear Clear    Glucose, UA Negative Negative    Bilirubin, UA Negative Negative    Ketones, UA Negative Negative    Specific Gravity, UA 1.020 1.005 - 1.030    Blood, UA Negative Negative     pH, UA 7.0 5.0 - 8.0    Protein, UA Negative Negative mg/dL    Urobilinogen, UA 0.2 E.U./dL 0.2 E.U./dL, 1.0 E.U./dL    Nitrite, UA Negative Negative    Leukocytes, UA Negative Negative    Narrative    Microscopic not indicated  UC not indicated   Thyroid Cascade   Result Value Ref Range    TSH 1.70 0.30 - 5.00 uIU/mL   PSA, Annual Screen (Prostatic-Specific Antigen)   Result Value Ref Range    PSA 2.1 0.0 - 6.5 ng/mL    Narrative    Method is Abbott Prostate-Specific Antigen (PSA)  Standard-WHO 1st International (90:10)   Glycosylated Hemoglobin A1c   Result Value Ref Range    Hemoglobin A1c 6.0 3.5 - 6.0 %       Peter, it was good to see you again.  Your labs here all look good.  PSA is stable.  Thyroid function is normal.  Cholesterol is excellent.  Liver and kidney tests are stable.  Blood sugar is stable in the prediabetic range.  Urine and blood counts are normal.     Please call with questions or contact us using [employer].    Sincerely,        Electronically signed by Juan Mayer MD

## 2021-06-22 NOTE — PROGRESS NOTES
Assessment and Plan:   1. Encounter for Medicare annual wellness exam  Patient lives a healthy lifestyle.  He will continue same.  He was given all of his prescriptions to take with him to [facility].  He is up-to-date on his health maintenance and immunizations.    2. Gastroesophageal reflux disease without esophagitis  Continue with current regimen.  Doing well I believe with no medicines.    3. Atherosclerosis of native coronary artery of native heart without angina pectoris  Asymptomatic.  Continue statin and aspirin.    4. Benign Essential Hypertension  Control.  Continue current regimen.    5. IFG (impaired fasting glucose)  We will further evaluate with A1c.  - Glycosylated Hemoglobin A1c    6. Major depression, recurrent, chronic (H)  Well-controlled for many years.  Continue to follow with his psychiatrist.        The patient's current medical problems were reviewed.    I have had an Advance Directives discussion with the patient.  The following health maintenance schedule was reviewed with the patient and provided in printed form in the after visit summary:   Health Maintenance   Topic Date Due     DEPRESSION FOLLOW UP  1945     COLONOSCOPY  02/12/2015     ZOSTER VACCINES (2 of 2) 12/31/2015     INFLUENZA VACCINE RULE BASED (1) 08/01/2018     FALL RISK ASSESSMENT  12/27/2019     ADVANCE DIRECTIVES DISCUSSED WITH PATIENT  12/18/2022     TD 18+ HE  11/05/2025     PNEUMOCOCCAL POLYSACCHARIDE VACCINE AGE 65 AND OVER  Completed     PNEUMOCOCCAL CONJUGATE VACCINE FOR ADULTS (PCV13 OR PREVNAR)  Completed        Subjective:   Chief Complaint: Vinod Clifton is an 73 y.o. male here for an Annual Wellness visit.   HPI: Vinod comes in today for his annual wellness.  Vinod lives in [place] but continues to follow with me on a yearly basis.  Has been pretty healthy.  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  He is actually going to be moving to the [place] along  with his wife for a year or 18 months to be a  of education there.  Looking forward to that.  He has not really thrived in his long term.  He enjoys work.  He does do a lot of travel.  He will be going to the [place] in [facility] to watch northern lights in the next few weeks.  After that he had's to [place].  He denies any other concerns for me.  He is been feeling well.  He did have labs done in [place] which were reviewed today.  He has some mild hyperglycemia in the fasting state which is possibly due to his statin use.  He has known coronary disease which is asymptomatic.  He is very active.  He has chronic insomnia for which he uses as needed Ambien.  He has a limited that on his own and is sleeping well he tells me.  He also tells me that his mood is been good.  He follows with a psychiatrist in [place] and has for years and has done well with Wellbutrin.    Review of Systems:    Please see above.  The rest of the review of systems are negative for all systems.    Patient Care Team:  Juan Mayer MD as PCP - General     Patient Active Problem List   Diagnosis     Overweight     GERD (gastroesophageal reflux disease)     Benign Essential Hypertension     Coronary Artery Disease     Insomnia     Cough     Major depression, recurrent, chronic (H)     No past medical history on file.   No past surgical history on file.   No family history on file.   Social History     Socioeconomic History     Marital status:      Spouse name: Not on file     Number of children: Not on file     Years of education: Not on file     Highest education level: Not on file   Social Needs     Financial resource strain: Not on file     Food insecurity - worry: Not on file     Food insecurity - inability: Not on file     Transportation needs - medical: Not on file     Transportation needs - non-medical: Not on file   Occupational History     Not on file   Tobacco Use     Smoking status: Former Smoker      "Smokeless tobacco: Never Used   Substance and Sexual Activity     Alcohol use: Not on file     Drug use: Not on file     Sexual activity: Not on file   Other Topics Concern     Not on file   Social History Narrative     Not on file      Current Outpatient Medications   Medication Sig Dispense Refill     aspirin 81 MG EC tablet Take 1 tablet (81 mg total) by mouth daily. 90 tablet 3     atenolol (TENORMIN) 25 MG tablet Take one tablet in the evening. 90 tablet 3     atenolol (TENORMIN) 50 MG tablet Take 1 tablet (50 mg total) by mouth every morning. 90 tablet 3     atorvastatin (LIPITOR) 20 MG tablet TAKE ONE TABLET BY MOUTH DAILY WITH 40MG TABLET FOR A TOTAL OF 60MG PER DAY 90 tablet 3     atorvastatin (LIPITOR) 40 MG tablet TAKE ONE TABLET BY MOUTH DAILY 90 tablet 3     buPROPion (WELLBUTRIN XL) 300 MG 24 hr tablet Take 1 tablet (300 mg total) by mouth daily. 90 tablet 3     cholecalciferol, vitamin D3, (VITAMIN D3) 2,000 unit Tab Take 1 tablet (2,000 Units total) by mouth daily. 90 tablet 3     losartan-hydrochlorothiazide (HYZAAR) 50-12.5 mg per tablet TAKE ONE TABLET BY MOUTH TWICE A DAY **ONE AT 6AM AND ONE AT 4PM** 180 tablet 3     metroNIDAZOLE (METROLOTION) 0.75 % Lotn Apply 1 application topically at bedtime. 1 Bottle 3     Saccharomyces boulardii (FLORASTOR) 250 mg capsule Take 1 capsule (250 mg total) by mouth once a week. 90 capsule 3     sildenafil (VIAGRA) 100 MG tablet Take 1 tablet (100 mg total) by mouth daily as needed for erectile dysfunction. 30 tablet 3     zolpidem (AMBIEN) 5 MG tablet Take 1 tablet (5 mg total) by mouth at bedtime as needed for sleep. 90 tablet 0     No current facility-administered medications for this visit.       Objective:   Vital Signs:   Visit Vitals  /80 (Patient Site: Right Arm, Patient Position: Sitting, Cuff Size: Adult Regular)   Pulse 68   Ht 5' 7\" (1.702 m)   Wt 165 lb (74.8 kg)   SpO2 97%   BMI 25.84 kg/m         VisionScreening:  No exam data present " "    PHYSICAL EXAM  /80 (Patient Site: Right Arm, Patient Position: Sitting, Cuff Size: Adult Regular)   Pulse 68   Ht 5' 7\" (1.702 m)   Wt 165 lb (74.8 kg)   SpO2 97%   BMI 25.84 kg/m      General Appearance:    Alert, cooperative, no distress, appears stated age   Head:    Normocephalic, without obvious abnormality, atraumatic   Eyes:    PERRL, conjunctiva/corneas clear, EOM's intact, fundi     benign, both eyes        Ears:    Normal TM's and external ear canals, both ears   Nose:   Nares normal, septum midline, mucosa normal, no drainage    or sinus tenderness   Throat:   Lips, mucosa, and tongue normal; teeth and gums normal   Neck:   Supple, symmetrical, trachea midline, no adenopathy;        thyroid:  No enlargement/tenderness/nodules; no carotid    bruit or JVD   Back:     Symmetric, no curvature, ROM normal, no CVA tenderness   Lungs:     Clear to auscultation bilaterally, respirations unlabored   Chest wall:    No tenderness or deformity   Heart:    Regular rate and rhythm, S1 and S2 normal, no murmur, rub    or gallop   Abdomen:     Soft, non-tender, bowel sounds active all four quadrants,     no masses, no organomegaly   Genitalia:    Normal male without lesion, discharge or tenderness   Rectal:    Normal tone, normal prostate, no masses or tenderness;    guaiac negative stool   Extremities:   Extremities normal, atraumatic, no cyanosis or edema   Pulses:   2+ and symmetric all extremities   Skin:   Skin color, texture, turgor normal, no rashes or lesions   Lymph nodes:   Cervical, supraclavicular, and axillary nodes normal   Neurologic:   CNII-XII intact. Normal strength, sensation and reflexes       throughout       Assessment Results 12/27/2018   Activities of Daily Living No help needed   Instrumental Activities of Daily Living No help needed   Get Up and Go Score Less than 12 seconds   Mini Cog Total Score 5   Some recent data might be hidden     A Mini-Cog score of 0-2 suggests the " possibility of dementia, score of 3-5 suggests no dementia    Identified Health Risks:     The patient was provided with written information regarding signs of hearing loss.  Patient's advanced directive was discussed and I am comfortable with the patient's wishes.

## 2021-10-11 ENCOUNTER — HEALTH MAINTENANCE LETTER (OUTPATIENT)
Age: 76
End: 2021-10-11

## 2022-02-15 ENCOUNTER — OFFICE VISIT (OUTPATIENT)
Dept: CARDIOLOGY CLINIC | Age: 77
End: 2022-02-15
Payer: MEDICARE

## 2022-02-15 VITALS
BODY MASS INDEX: 26.06 KG/M2 | OXYGEN SATURATION: 99 % | WEIGHT: 166 LBS | DIASTOLIC BLOOD PRESSURE: 72 MMHG | SYSTOLIC BLOOD PRESSURE: 138 MMHG | HEIGHT: 67 IN | HEART RATE: 72 BPM

## 2022-02-15 DIAGNOSIS — R93.1 ELEVATED CORONARY ARTERY CALCIUM SCORE: ICD-10-CM

## 2022-02-15 DIAGNOSIS — R93.1 HIGH CORONARY ARTERY CALCIUM SCORE: Primary | ICD-10-CM

## 2022-02-15 DIAGNOSIS — E78.5 HYPERLIPIDEMIA, UNSPECIFIED HYPERLIPIDEMIA TYPE: ICD-10-CM

## 2022-02-15 DIAGNOSIS — I10 BENIGN ESSENTIAL HTN: ICD-10-CM

## 2022-02-15 DIAGNOSIS — I70.90 ATHEROSCLEROSIS: ICD-10-CM

## 2022-02-15 PROCEDURE — G8419 CALC BMI OUT NRM PARAM NOF/U: HCPCS | Performed by: SPECIALIST

## 2022-02-15 PROCEDURE — 1101F PT FALLS ASSESS-DOCD LE1/YR: CPT | Performed by: SPECIALIST

## 2022-02-15 PROCEDURE — G8536 NO DOC ELDER MAL SCRN: HCPCS | Performed by: SPECIALIST

## 2022-02-15 PROCEDURE — G8427 DOCREV CUR MEDS BY ELIG CLIN: HCPCS | Performed by: SPECIALIST

## 2022-02-15 PROCEDURE — 93010 ELECTROCARDIOGRAM REPORT: CPT | Performed by: SPECIALIST

## 2022-02-15 PROCEDURE — G9717 DOC PT DX DEP/BP F/U NT REQ: HCPCS | Performed by: SPECIALIST

## 2022-02-15 PROCEDURE — 99204 OFFICE O/P NEW MOD 45 MIN: CPT | Performed by: SPECIALIST

## 2022-02-15 PROCEDURE — G8754 DIAS BP LESS 90: HCPCS | Performed by: SPECIALIST

## 2022-02-15 PROCEDURE — G8752 SYS BP LESS 140: HCPCS | Performed by: SPECIALIST

## 2022-02-15 PROCEDURE — G0463 HOSPITAL OUTPT CLINIC VISIT: HCPCS | Performed by: SPECIALIST

## 2022-02-15 PROCEDURE — 93005 ELECTROCARDIOGRAM TRACING: CPT | Performed by: SPECIALIST

## 2022-02-15 RX ORDER — LOSARTAN POTASSIUM AND HYDROCHLOROTHIAZIDE 12.5; 5 MG/1; MG/1
1 TABLET ORAL 2 TIMES DAILY
COMMUNITY
Start: 2021-11-29 | End: 2022-08-29 | Stop reason: SDUPTHER

## 2022-02-15 RX ORDER — MINERAL OIL
ENEMA (ML) RECTAL
COMMUNITY

## 2022-02-15 RX ORDER — OMEPRAZOLE 20 MG/1
20 CAPSULE, DELAYED RELEASE ORAL EVERY OTHER DAY
COMMUNITY
End: 2022-08-29 | Stop reason: SDUPTHER

## 2022-02-15 NOTE — PROGRESS NOTES
Russell Seals MD. [facility]              Patient: Kameron Mejia  : 1945      Today's Date: 2/15/2022            HISTORY OF PRESENT ILLNESS:     History of Present Illness:    Last seen in 2019. Here for follow-up. No complaints. No CP or SOB. Exercises regularly. PAST MEDICAL HISTORY:     Past Medical History:   Diagnosis Date    Arm paresthesia, right     from rototor cuff injury    Benign essential HTN     Depression, major     from divorce. on wellburtrin. failed prozac.  sees psych in [place] ED (erectile dysfunction)     GERD (gastroesophageal reflux disease)     High coronary artery calcium score     last stress test ;   CAC score 1400 in  per patient    History of bronchitis     Hyperlipidemia     Insomnia     Personal history of multiple pulmonary nodules     no further f/u needed. followed by serial CTs, last . 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  normal per pt in [address]          MEDICATIONS:     Current Outpatient Medications   Medication Sig Dispense Refill    losartan-hydroCHLOROthiazide (HYZAAR) 50-12.5 mg per tablet Take 1 Tablet by mouth two (2) times a day.  omeprazole (PRILOSEC) 20 mg capsule Take 20 mg by mouth every other day.  fexofenadine (ALLEGRA) 180 mg tablet Take  by mouth. Seasonal      atenolol (TENORMIN) 50 mg tablet Take 1 pill in AM and 1/2 pill in  Tab 3    atorvastatin (LIPITOR) 40 mg tablet Take 1 Tab by mouth daily. Plus an extra half tab.  Total 60 mg daily 180 Tab 3    metroNIDAZOLE (METROLOTION) 0.75 % lotion as needed.  atorvastatin (LIPITOR) 20 mg tablet Take 1 Tab by mouth daily. Takes 60 mg total 90 Tab 1    buPROPion XL (WELLBUTRIN XL) 300 mg XL tablet Take 1 Tab by mouth every morning. 90 Tab 1    sildenafil citrate (VIAGRA) 50 mg tablet Take 1 Tab by mouth as needed. 8 Tab 3    Cholecalciferol, Vitamin D3, (VITAMIN D3) 2,000 unit cap capsule Take 2,000 Units by mouth daily. 30 Cap 11    aspirin delayed-release 81 mg tablet Take 1 Tab by mouth daily. 30 Tab 11     No Known Allergies        SOCIAL HISTORY:     Social History     Tobacco Use    Smoking status: Former Smoker     Quit date: 1989     Years since quittin.1    Smokeless tobacco: Never Used   Substance Use Topics    Alcohol use: Yes     Alcohol/week: 7.0 standard drinks     Types: 7 Glasses of wine per week    Drug use: No         FAMILY HISTORY:     Family History   Problem Relation Age of Onset    Heart Disease Father     Hypertension Father     Heart Disease Brother             REVIEW OF SYMPTOMS:      Review of Symptoms:  Constitutional: Negative for fever, chills  HEENT: Negative for nosebleeds, tinnitus, and vision changes. Respiratory: Negative for cough, wheezing  Cardiovascular: Negative for orthopnea, claudication, syncope, and PND. Gastrointestinal: Negative for abdominal pain, melena. Genitourinary: Negative for dysuria  Musculoskeletal: Negative for myalgias. Skin: Negative for rash  Heme: No problems bleeding. Neurological: Negative for speech change and focal weakness.               PHYSICAL EXAM:      Physical Exam:  Visit Vitals  /72 (BP 1 Location: Left upper arm, BP Patient Position: Sitting, BP Cuff Size: Adult)   Pulse 72   Ht 5' 7\" (1.702 m)   Wt 166 lb (75.3 kg)   SpO2 99%   BMI 26.00 kg/m²       Patient appears generally well, mood and affect are appropriate and pleasant. HEENT:  Hearing intact, non-icteric, normocephalic, atraumatic. Neck Exam: Supple, No JVD or carotid bruits. Lung Exam: Clear to auscultation, even breath sounds. Cardiac Exam: Regular rate and rhythm with 1/6 systolic murmur  Abdomen: Soft, non-tender, normal bowel sounds. No bruits or masses. Extremities: Moves all ext well. No lower extremity edema. Psych: Appropriate affect  Neuro - Grossly intact           LABS / OTHER STUDIES:      Labs 12/17 - CMP OK, CBC OK, chol 126, TG 66, HDL 49, LDL 64  Labs 12/9/21 - CBC and CMP OK, chol 142, TG 51, HL 59, LDL 73, A1c 5.8         CARDIAC DIAGNOSTICS:      Cardiac Evaluation Includes:  EKG 3/2/18 - NSR, normal   EKG 2/15/22 - NSR, normal       Treadmill Stress test 3/5/18 - walked 10:55 (13.4 METS). Normal study     Echo 3/5/18 - LVEF 65%, mild-mod MAC and mild MR, AV sclerosis                ASSESSMENT AND PLAN:      Assessment and Plan:  1) Atherosclerosis   - CAC score 1400 in 2006 per patient.    - No prior CV event event. - Treadmill Stress test 3/5/18 - walked 10:55 (13.4 METS). Normal study   - Echo 3/5/18 - LVEF 65%, mild-mod MAC and mild MR, AV sclerosis   - Denies any anginal complaints -- works out regularly. - check a treadmill stress test periodically given high CAC score     2) HTN  - BP goal < 130/80   - BP OK at home   - continue current meds      3) Dyslipidemia  - prior lipids OK (followed by PCP)   - cont statin     4)  Mild MR in past   - recheck an echo      5) See me back in one year. Patient expressed understanding of the plan - questions were answered. Originally from [place]. 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 Consulted in [employer] in 2019. Wife had stage 4 pancreatic cancer and treated at [facility] - she passed in 2021. Alonzo Winters MD, [address], [address]  [address], [address]                 [place], [place] [address]  Ph: [phone]                                [phone]         ADDENDUM   3/6/2022  Prior records reviewed. Labs 3/4/20 - CMP OK, CBC OK,     ADDENDUM   3/7/2022  Treadmill Stress test 3/7/22 - walked 9 min, normal study   Echo 3/7/22 - LVEF 60-65%, MAC, tr MR,  AV sclerosis    Will send a message.

## 2022-02-15 NOTE — PROGRESS NOTES
Room 6    Elev CCS in the past  No heart sympt currently  No new CCS done     Seen cardiology before: Darby Fleischer 1-2019    Visit Vitals  /72 (BP 1 Location: Left upper arm, BP Patient Position: Sitting, BP Cuff Size: Adult)   Pulse 72   Ht 5' 7\" (1.702 m)   Wt 166 lb (75.3 kg)   SpO2 99%   BMI 26.00 kg/m²         Chest pain: no  Shortness of breath: no  Edema: no  Palpitations: no  Dizziness: no    ER/Urgent care/Hospitalizations for your symptoms: no

## 2022-02-15 NOTE — PROGRESS NOTES
Orders for Treadmill stress test and echo when possible. See me in one year    per Dr. Concepcion BAUTISTA.   Dx: elevated cacs, athero, mr

## 2022-02-18 ENCOUNTER — TELEPHONE (OUTPATIENT)
Dept: FAMILY MEDICINE CLINIC | Age: 77
End: 2022-02-18

## 2022-02-18 NOTE — TELEPHONE ENCOUNTER
----- Message from [address] [facility] sent at 2022 11:27 AM EST -----  Subject: Appointment Request    Reason for Call: New Patient Request Appointment    QUESTIONS  Type of Appointment? New Patient/New to Provider  Reason for appointment request? No appointments available during search  Additional Information for Provider? Pt was referred so Dr. Rolando Donald by Dr. Mamta Brian. Please call pt and let him know if Dr. Rolando Donald with take him on as a   new pt.  ---------------------------------------------------------------------------  --------------  [address]  What is the best way for the office to contact you? OK to leave message on   voicemail  Preferred Call Back Phone Number? [phone]  ---------------------------------------------------------------------------  --------------  SCRIPT ANSWERS  Relationship to Patient? Self  Specialty Confirmation? Primary Care  Is this the first appointment to establish care for a ? No  Have you been diagnosed with, awaiting test results for, or told that you   are suspected of having COVID-19 (Coronavirus)? (If patient has tested   negative or was tested as a requirement for work, school, or travel and   not based on symptoms, answer no)? No  Within the past 10 days have you developed any of the following symptoms   (answer no if symptoms have been present longer than 10 days or began   more than 10 days ago)? Fever or Chills, Cough, Shortness of breath or   difficulty breathing, Loss of taste or smell, Sore throat, Nasal   congestion, Sneezing or runny nose, Fatigue or generalized body aches   (answer no if pain is specific to a body part e.g. back pain), Diarrhea,   Headache? No  Have you had close contact with someone with COVID-19 in the last 7 days? No  (Service Expert  click yes below to proceed with [email or web address] As Usual   Scheduling)?  Yes

## 2022-03-07 ENCOUNTER — ANCILLARY PROCEDURE (OUTPATIENT)
Dept: CARDIOLOGY CLINIC | Age: 77
End: 2022-03-07
Payer: MEDICARE

## 2022-03-07 VITALS
HEIGHT: 67 IN | SYSTOLIC BLOOD PRESSURE: 128 MMHG | WEIGHT: 166 LBS | BODY MASS INDEX: 26.06 KG/M2 | DIASTOLIC BLOOD PRESSURE: 70 MMHG

## 2022-03-07 VITALS — WEIGHT: 166 LBS | BODY MASS INDEX: 26.06 KG/M2 | HEIGHT: 67 IN

## 2022-03-07 DIAGNOSIS — I70.90 ATHEROSCLEROSIS: ICD-10-CM

## 2022-03-07 DIAGNOSIS — R93.1 HIGH CORONARY ARTERY CALCIUM SCORE: ICD-10-CM

## 2022-03-07 DIAGNOSIS — R93.1 ELEVATED CORONARY ARTERY CALCIUM SCORE: ICD-10-CM

## 2022-03-07 DIAGNOSIS — I10 BENIGN ESSENTIAL HTN: ICD-10-CM

## 2022-03-07 LAB
ECHO AO ROOT DIAM: 3.2 CM
ECHO AO ROOT INDEX: 1.71 CM/M2
ECHO AV AREA PEAK VELOCITY: 2.6 CM2
ECHO AV AREA PEAK VELOCITY: 2.6 CM2
ECHO AV AREA VTI: 2.4 CM2
ECHO AV AREA VTI: 2.4 CM2
ECHO AV MEAN GRADIENT: 6 MMHG
ECHO AV MEAN VELOCITY: 1.1 M/S
ECHO AV PEAK GRADIENT: 10 MMHG
ECHO AV PEAK VELOCITY: 1.6 M/S
ECHO AV VELOCITY RATIO: 0.75
ECHO AV VTI: 35.3 CM
ECHO EST RA PRESSURE: 3 MMHG
ECHO LA DIAMETER INDEX: 2.3 CM/M2
ECHO LA DIAMETER: 4.3 CM
ECHO LA TO AORTIC ROOT RATIO: 1.34
ECHO LA VOL 2C: 54 ML (ref 18–58)
ECHO LA VOL 4C: 45 ML (ref 18–58)
ECHO LA VOL BP: 50 ML (ref 18–58)
ECHO LA VOL/BSA BIPLANE: 27 ML/M2 (ref 16–34)
ECHO LA VOLUME AREA LENGTH: 54 ML
ECHO LA VOLUME INDEX A2C: 29 ML/M2 (ref 16–34)
ECHO LA VOLUME INDEX A4C: 24 ML/M2 (ref 16–34)
ECHO LA VOLUME INDEX AREA LENGTH: 29 ML/M2 (ref 16–34)
ECHO LV E' LATERAL VELOCITY: 8 CM/S
ECHO LV E' SEPTAL VELOCITY: 7 CM/S
ECHO LV EDV A2C: 82 ML
ECHO LV EDV A4C: 104 ML
ECHO LV EDV BP: 94 ML (ref 67–155)
ECHO LV EDV INDEX A4C: 56 ML/M2
ECHO LV EDV INDEX BP: 50 ML/M2
ECHO LV EDV NDEX A2C: 44 ML/M2
ECHO LV EJECTION FRACTION A2C: 65 %
ECHO LV EJECTION FRACTION A4C: 63 %
ECHO LV EJECTION FRACTION BIPLANE: 65 % (ref 55–100)
ECHO LV ESV A2C: 29 ML
ECHO LV ESV A4C: 38 ML
ECHO LV ESV BP: 33 ML (ref 22–58)
ECHO LV ESV INDEX A2C: 16 ML/M2
ECHO LV ESV INDEX A4C: 20 ML/M2
ECHO LV ESV INDEX BP: 18 ML/M2
ECHO LV FRACTIONAL SHORTENING: 32 % (ref 28–44)
ECHO LV INTERNAL DIMENSION DIASTOLE INDEX: 1.98 CM/M2
ECHO LV INTERNAL DIMENSION DIASTOLIC: 3.7 CM (ref 4.2–5.9)
ECHO LV INTERNAL DIMENSION SYSTOLIC INDEX: 1.34 CM/M2
ECHO LV INTERNAL DIMENSION SYSTOLIC: 2.5 CM
ECHO LV IVSD: 1 CM (ref 0.6–1)
ECHO LV MASS 2D: 104.6 G (ref 88–224)
ECHO LV MASS INDEX 2D: 55.9 G/M2 (ref 49–115)
ECHO LV POSTERIOR WALL DIASTOLIC: 0.9 CM (ref 0.6–1)
ECHO LV RELATIVE WALL THICKNESS RATIO: 0.49
ECHO LVOT AREA: 3.5 CM2
ECHO LVOT AV VTI INDEX: 0.7
ECHO LVOT DIAM: 2.1 CM
ECHO LVOT MEAN GRADIENT: 3 MMHG
ECHO LVOT PEAK GRADIENT: 6 MMHG
ECHO LVOT PEAK VELOCITY: 1.2 M/S
ECHO LVOT STROKE VOLUME INDEX: 45.7 ML/M2
ECHO LVOT SV: 85.5 ML
ECHO LVOT VTI: 24.7 CM
ECHO MV A VELOCITY: 1.15 M/S
ECHO MV AREA PHT: 3.8 CM2
ECHO MV E DECELERATION TIME (DT): 201.2 MS
ECHO MV E VELOCITY: 1.04 M/S
ECHO MV E/A RATIO: 0.9
ECHO MV E/E' LATERAL: 13
ECHO MV E/E' RATIO (AVERAGED): 13.93
ECHO MV E/E' SEPTAL: 14.86
ECHO MV PRESSURE HALF TIME (PHT): 58.3 MS
ECHO RA MINOR AXIS INDEX: 2.19 CM/M2
ECHO RA MINOR AXIS: 4.1 CM
ECHO RIGHT VENTRICULAR SYSTOLIC PRESSURE (RVSP): 30 MMHG
ECHO RV FREE WALL PEAK S': 13 CM/S
ECHO RV TAPSE: 2.7 CM (ref 1.5–2)
ECHO TV REGURGITANT MAX VELOCITY: 2.6 M/S
ECHO TV REGURGITANT PEAK GRADIENT: 27 MMHG
STRESS ANGINA INDEX: 0
STRESS BASELINE DIAS BP: 76 MMHG
STRESS BASELINE HR: 67 BPM
STRESS BASELINE SYS BP: 142 MMHG
STRESS ESTIMATED WORKLOAD: 10.5 METS
STRESS EXERCISE DUR MIN: 9 MIN
STRESS EXERCISE DUR SEC: 8 SEC
STRESS O2 SAT PEAK: 100 %
STRESS O2 SAT REST: 98 %
STRESS PEAK DIAS BP: 72 MMHG
STRESS PEAK SYS BP: 184 MMHG
STRESS PERCENT HR ACHIEVED: 92 %
STRESS POST PEAK HR: 130 BPM
STRESS RATE PRESSURE PRODUCT: NORMAL BPM*MMHG
STRESS SR DUKE TREADMILL SCORE: 9
STRESS ST DEPRESSION: 0 MM
STRESS TARGET HR: 142 BPM

## 2022-03-07 PROCEDURE — 93016 CV STRESS TEST SUPVJ ONLY: CPT | Performed by: SPECIALIST

## 2022-03-07 PROCEDURE — 93306 TTE W/DOPPLER COMPLETE: CPT | Performed by: SPECIALIST

## 2022-03-07 PROCEDURE — 93018 CV STRESS TEST I&R ONLY: CPT | Performed by: SPECIALIST

## 2022-03-07 PROCEDURE — 93017 CV STRESS TEST TRACING ONLY: CPT | Performed by: SPECIALIST

## 2022-03-08 NOTE — PROGRESS NOTES
Dear Mr. Willi Argueat,  Your echo and stress test results are stable. Overall the studies are essentially normal.   Please let me know if you have any questions.    Dr. Allyson Pena

## 2022-06-02 ENCOUNTER — OFFICE VISIT (OUTPATIENT)
Dept: FAMILY MEDICINE CLINIC | Age: 77
End: 2022-06-02
Payer: MEDICARE

## 2022-06-02 VITALS
RESPIRATION RATE: 16 BRPM | HEART RATE: 79 BPM | SYSTOLIC BLOOD PRESSURE: 126 MMHG | TEMPERATURE: 97.2 F | HEIGHT: 67 IN | BODY MASS INDEX: 25.11 KG/M2 | DIASTOLIC BLOOD PRESSURE: 68 MMHG | WEIGHT: 160 LBS | OXYGEN SATURATION: 98 %

## 2022-06-02 DIAGNOSIS — M67.912 BILATERAL ROTATOR CUFF DYSFUNCTION: ICD-10-CM

## 2022-06-02 DIAGNOSIS — G47.00 INSOMNIA, UNSPECIFIED TYPE: ICD-10-CM

## 2022-06-02 DIAGNOSIS — F33.1 MODERATE EPISODE OF RECURRENT MAJOR DEPRESSIVE DISORDER (HCC): ICD-10-CM

## 2022-06-02 DIAGNOSIS — E78.5 HYPERLIPIDEMIA, UNSPECIFIED HYPERLIPIDEMIA TYPE: ICD-10-CM

## 2022-06-02 DIAGNOSIS — M67.911 BILATERAL ROTATOR CUFF DYSFUNCTION: ICD-10-CM

## 2022-06-02 DIAGNOSIS — I10 BENIGN ESSENTIAL HTN: Primary | ICD-10-CM

## 2022-06-02 DIAGNOSIS — Z11.59 NEED FOR HEPATITIS C SCREENING TEST: ICD-10-CM

## 2022-06-02 DIAGNOSIS — R97.20 PSA ELEVATION: ICD-10-CM

## 2022-06-02 DIAGNOSIS — R93.1 HIGH CORONARY ARTERY CALCIUM SCORE: ICD-10-CM

## 2022-06-02 DIAGNOSIS — Z87.39 HISTORY OF GOUT: ICD-10-CM

## 2022-06-02 DIAGNOSIS — Z12.5 SCREENING FOR PROSTATE CANCER: ICD-10-CM

## 2022-06-02 DIAGNOSIS — I70.90 ATHEROSCLEROSIS: ICD-10-CM

## 2022-06-02 DIAGNOSIS — E55.9 VITAMIN D INSUFFICIENCY: ICD-10-CM

## 2022-06-02 DIAGNOSIS — R73.01 IFG (IMPAIRED FASTING GLUCOSE): ICD-10-CM

## 2022-06-02 PROCEDURE — G8754 DIAS BP LESS 90: HCPCS | Performed by: FAMILY MEDICINE

## 2022-06-02 PROCEDURE — 1123F ACP DISCUSS/DSCN MKR DOCD: CPT | Performed by: FAMILY MEDICINE

## 2022-06-02 PROCEDURE — G8752 SYS BP LESS 140: HCPCS | Performed by: FAMILY MEDICINE

## 2022-06-02 PROCEDURE — G9717 DOC PT DX DEP/BP F/U NT REQ: HCPCS | Performed by: FAMILY MEDICINE

## 2022-06-02 PROCEDURE — G8427 DOCREV CUR MEDS BY ELIG CLIN: HCPCS | Performed by: FAMILY MEDICINE

## 2022-06-02 PROCEDURE — 1101F PT FALLS ASSESS-DOCD LE1/YR: CPT | Performed by: FAMILY MEDICINE

## 2022-06-02 PROCEDURE — G8536 NO DOC ELDER MAL SCRN: HCPCS | Performed by: FAMILY MEDICINE

## 2022-06-02 PROCEDURE — 99205 OFFICE O/P NEW HI 60 MIN: CPT | Performed by: FAMILY MEDICINE

## 2022-06-02 PROCEDURE — G8417 CALC BMI ABV UP PARAM F/U: HCPCS | Performed by: FAMILY MEDICINE

## 2022-06-02 RX ORDER — ATENOLOL 25 MG/1
25 TABLET ORAL
Qty: 90 TABLET | Refills: 0
Start: 2022-06-02 | End: 2022-08-29 | Stop reason: SDUPTHER

## 2022-06-02 NOTE — PROGRESS NOTES
Chief Complaint   Patient presents with   [facility] Establish Care     shoulder pn 7 days

## 2022-06-02 NOTE — PATIENT INSTRUCTIONS
Here today as a new patient to [facility] care  Chart from prior PCP and also from Cardiology reviewed  1. Benign essential HTN  bp at goal  C/w current medications  Labs per orders  - CBC W/O DIFF; Future  - METABOLIC PANEL, COMPREHENSIVE; Future  - LIPID PANEL; Future  - atenoloL (TENORMIN) 25 mg tablet; Take 1 Tablet by mouth nightly. Dispense: 90 Tablet; Refill: 0    2. Vitamin D insufficiency  Due for recheck, supplement where necessary  - VITAMIN D, 25 HYDROXY; Future    3. Insomnia, unspecified type  Does not want to go back on ambien  I recommend meditation  I also recommend consideration of non-addictive medications for sleep (remeron perhaps) if needed in the future    4. Hyperlipidemia, unspecified hyperlipidemia type  Recheck due, flp ordered, high CAC score and monitoring peridic stress with Dr Paz Jefferson, recently doing well  - METABOLIC PANEL, COMPREHENSIVE; Future  - LIPID PANEL; Future    5. Moderate episode of recurrent major depressive disorder (HCC)  Stable, grieving loss of wife about 18 mo ago, has seen mental health and is on wellbutrin without acute exacerbation    6. Bilateral rotator cuff dysfunction  Recommend start with PT, has discrepency in strength and function L is worse than R (Which was surgically repaired in years past)  - REFERRAL TO PHYSICAL THERAPY    7. Screening for prostate cancer  Tells me desire to continue screening, was seeing rate of rise in PSA that was needing to be monitored  - PSA SCREENING (SCREENING); Future    8. PSA elevation  As per 7  - PSA SCREENING (SCREENING); Future    9. Atherosclerosis  See above, per cardiology, compliant with statin    10. High coronary artery calcium score  As above, per cardiology, compliant with statin    11. History of gout  Check uric acid, not on suppression  - URIC ACID; Future    12. IFG (impaired fasting glucose)  Due for lab recheck  - CBC W/O DIFF; Future  - METABOLIC PANEL, COMPREHENSIVE;  Future  - HEMOGLOBIN A1C WITH EAG; Future    13. Need for hepatitis C screening test  Due for routine screen  - HEPATITIS C AB;  Future

## 2022-06-02 NOTE — PROGRESS NOTES
Family Medicine Initial Office Visit  Patient: Edmundo Grimes  1945, 76 y.o., male  Encounter Date: 6/2/2022    ASSESSMENT & PLAN    ICD-10-CM ICD-9-CM    1. Benign essential HTN  I10 401.1 CBC W/O DIFF      METABOLIC PANEL, COMPREHENSIVE      LIPID PANEL      atenoloL (TENORMIN) 25 mg tablet   2. Vitamin D insufficiency  E55.9 268.9 VITAMIN D, 25 HYDROXY   3. Insomnia, unspecified type  G47.00 780.52    4. Hyperlipidemia, unspecified hyperlipidemia type  N14.5 481.6 METABOLIC PANEL, COMPREHENSIVE      LIPID PANEL   5. Moderate episode of recurrent major depressive disorder (HCC)  F33.1 296.32    6. Bilateral rotator cuff dysfunction  M67.911 726.10 REFERRAL TO PHYSICAL THERAPY    M67.912     7. Screening for prostate cancer  Z12.5 V76.44 PSA SCREENING (SCREENING)   8. PSA elevation  R97.20 790.93 PSA SCREENING (SCREENING)   9. Atherosclerosis  I70.90 440.9    10. High coronary artery calcium score  R93.1 414.00    11. History of gout  Z87.39 V12.29 URIC ACID   12. IFG (impaired fasting glucose)  R73.01 790.21 CBC W/O DIFF      METABOLIC PANEL, COMPREHENSIVE      HEMOGLOBIN A1C WITH EAG   13.  Need for hepatitis C screening test  Z11.59 V73.89 HEPATITIS C AB     Orders Placed This Encounter    CBC W/O DIFF     Standing Status:   Future     Standing Expiration Date:   4/6/6514    METABOLIC PANEL, COMPREHENSIVE     Standing Status:   Future     Standing Expiration Date:   6/2/2023    LIPID PANEL     Standing Status:   Future     Standing Expiration Date:   6/2/2023    HEPATITIS C AB - Future Default     Standing Status:   Future     Standing Expiration Date:   6/2/2023    PSA SCREENING (SCREENING)     Standing Status:   Future     Standing Expiration Date:   6/2/2023    VITAMIN D, 25 HYDROXY     Standing Status:   Future     Standing Expiration Date:   6/2/2023    HEMOGLOBIN A1C WITH EAG     Standing Status:   Future     Standing Expiration Date:   6/2/2023    URIC ACID     Standing Status:   Future Standing Expiration Date:   6/2/2023   Kaela Chu PT at [address]     Referral Priority:   Routine     Referral Type:   PT/OT/ST     Referral Reason:   Specialty Services Required     Number of Visits Requested:   1    atenoloL (TENORMIN) 25 mg tablet     Sig: Take 1 Tablet by mouth nightly. Dispense:  90 Tablet     Refill:  0     Patient Instructions   Here today as a new patient to est care  Chart from prior PCP and also from Cardiology reviewed  1. Benign essential HTN  bp at goal  C/w current medications  Labs per orders  - CBC W/O DIFF; Future  - METABOLIC PANEL, COMPREHENSIVE; Future  - LIPID PANEL; Future  - atenoloL (TENORMIN) 25 mg tablet; Take 1 Tablet by mouth nightly. Dispense: 90 Tablet; Refill: 0    2. Vitamin D insufficiency  Due for recheck, supplement where necessary  - VITAMIN D, 25 HYDROXY; Future    3. Insomnia, unspecified type  Does not want to go back on ambien  I recommend meditation  I also recommend consideration of non-addictive medications for sleep (remeron perhaps) if needed in the future    4. Hyperlipidemia, unspecified hyperlipidemia type  Recheck due, flp ordered, high CAC score and monitoring peridic stress with Dr García Cee, recently doing well  - METABOLIC PANEL, COMPREHENSIVE; Future  - LIPID PANEL; Future    5. Moderate episode of recurrent major depressive disorder (HCC)  Stable, grieving loss of wife about 18 mo ago, has seen mental health and is on wellbutrin without acute exacerbation    6. Bilateral rotator cuff dysfunction  Recommend start with PT, has discrepency in strength and function L is worse than R (Which was surgically repaired in years past)  - REFERRAL TO PHYSICAL THERAPY    7. Screening for prostate cancer  Tells me desire to continue screening, was seeing rate of rise in PSA that was needing to be monitored  - PSA SCREENING (SCREENING); Future    8. PSA elevation  As per 7  - PSA SCREENING (SCREENING); Future    9.  Atherosclerosis  See above, per cardiology, compliant with statin    10. High coronary artery calcium score  As above, per cardiology, compliant with statin    11. History of gout  Check uric acid, not on suppression  - URIC ACID; Future    12. IFG (impaired fasting glucose)  Due for lab recheck  - CBC W/O DIFF; Future  - METABOLIC PANEL, COMPREHENSIVE; Future  - HEMOGLOBIN A1C WITH EAG; Future    13. Need for hepatitis C screening test  Due for routine screen  - HEPATITIS C AB; Future    Total time on the date of encounter exceeded 62 minutes and included patient care, coordination of care, charting and preparation for visit. CHIEF COMPLAINT  Chief Complaint   Patient presents with    Establish Care     shoulder pn 7 days       SUBJECTIVE  Ramsey Williamson is a 76 y.o. male presenting today for establishing care. Patient works as consulting , part time  Patient lives in a house with self--lost wife in January of 2021--they were partners for 20 years  Patient was last seen by primary care Dr Lata Oviedo is eye doctor    Hx of abnormal cxr  Hx of elevated CAC scoring    Exercise: stationary bike, every other day 30-40 min per day, yardwork as well    *balance is not what it was 50 years ago, has been doing balance exercises    Diet / Micaela Solar a healthy diet  Eats fruit in the morning, lunch is an omelette or a salad, dinner is home cooked mediterranean diet    Tobacco:no-smoked 15 years, quit January of 1989  EtOH:1 glass of wine every day--has cut back on hard liquor, just once a week now he says  Illicit Substances:no    Not sleeping so well--falls alseep easily/quickly but does have horrible nightmares, they are really quite troubling, then he can't fall asleep as well--these are worse when his mood is exacerbated  Not waking up to go to the bathroom  He is able to go back to sleep      HTN: patient reports stable on medications. No chest pain, sob, headache, blurred vision, leg swelling, diaphoresis, falls.  Compliant with medications as prescribed. is checking blood pressures at home and reports range is 125-130/70-75. No significant hyper or hypotensive episodes that the patient reports today. Taking 60 mg lipitor qhs    Atenolol 50 in am and 25 in the evening  Losartan hctz 50-12.5 1 tab bid    Wellbutrin--300mg daily    Seeing mental health (since he has lost his wife several times)    Taking his vit d supplement consistently    GERD--on omeprazole every other day, easier to tolerate symptoms when off of hard liquors he says    Both rotator cuffs are \"bad\" on the R has just one tendon repaired  L arm has had pt  Lately he's feeling weakness in his L arm, unclear if it is related or not  He feels the biceps on the L arm is smaller than on the Right      Review of Systems  A 12 point review of systems was negative except as noted here or in the HPI. OBJECTIVE  Visit Vitals  /68   Pulse 79   Temp 97.2 °F (36.2 °C) (Temporal)   Resp 16   Ht 5' 7\" (1.702 m)   Wt 160 lb (72.6 kg)   SpO2 98%   BMI 25.06 kg/m²       Physical Exam  Vitals reviewed. Constitutional:       General: He is not in acute distress. Appearance: Normal appearance. He is normal weight. He is not ill-appearing, toxic-appearing or diaphoretic. HENT:      Head: Normocephalic and atraumatic. Right Ear: External ear normal.      Left Ear: External ear normal.      Ears:      Comments: Hearing aids in place     Mouth/Throat:      Mouth: Mucous membranes are moist.   Eyes:      General: No scleral icterus. Comments: r eye appears lower than left   Cardiovascular:      Rate and Rhythm: Normal rate and regular rhythm. Heart sounds: Murmur (1-2/6) heard. No friction rub. No gallop. Pulmonary:      Effort: Pulmonary effort is normal. No respiratory distress. Abdominal:      General: Bowel sounds are normal.      Palpations: Abdomen is soft. Skin:     Coloration: Skin is not jaundiced or pale.       Findings: No bruising, erythema, lesion or rash. Neurological:      General: No focal deficit present. Mental Status: He is alert. Cranial Nerves: No cranial nerve deficit. Gait: Gait normal.   Psychiatric:         Mood and Affect: Mood normal.         Behavior: Behavior normal.         Thought Content: Thought content normal.         Judgment: Judgment normal.         No results found for any visits on 22. HISTORICAL  Reviewed and updated today, and as noted below:    Past Medical History:   Diagnosis Date    Arm paresthesia, right     from rototor cuff injury    Atherosclerosis     Benign essential HTN     Coronary artery disease, non-occlusive     Depression, major 1999    from divorce. on wellburtrin. failed prozac.  sees psych in [place] ED (erectile dysfunction)     GERD (gastroesophageal reflux disease)     High coronary artery calcium score 2005    last stress test ;   CAC score 1400 in  per patient    History of bronchitis     Hyperlipidemia     Insomnia     Personal history of multiple pulmonary nodules     no further f/u needed. followed by serial CTs, last .      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  normal per pt in [address] Right      Family History   Problem Relation Age of Onset    Heart Disease Father     Hypertension Father     Heart Disease Brother      Social History     Tobacco Use   Smoking Status Former Smoker    Quit date: 1989    Years since quittin.4   Smokeless Tobacco Never Used     Social History     Socioeconomic History    Marital status:      Spouse name: Caroline Kong Marlene Velasquez Number of children: 2   Occupational History    Occupation: Consultant [employer]   Tobacco Use    Smoking status: Former Smoker     Quit date: 1989     Years since quittin.4    Smokeless tobacco: Never Used   Substance and Sexual Activity    Alcohol use:  Yes     Alcohol/week: 7.0 standard drinks     Types: 7 Glasses of wine per week    Drug use: No    Sexual activity: Never   Social History Narrative    2 step-daughters     Social Determinants of Health     Physical Activity: Insufficiently Active    Days of Exercise per Week: 4 days    Minutes of Exercise per Session: 30 min     No Known Allergies    Ancillary Procedure on 2022   Component Date Value Ref Range Status    Stress Target HR 2022 142  bpm Final    Exercise Duration Time 2022 9  min Final    Exercuse Duration Seconds 2022 8  sec Final    Stress Systolic BP  662  mmHg Final    Stress Diastolic BP  72  mmHg Final    Stress Peak HR 2022 130  BPM Final    Baseline HR 2022 67  BPM Final    Stress Estimated Workload 2022 10.5  METS Final    Stress Rate Pressure Product 2022 23,920  BPM*mmHg Final    Stress Percent HR Achieved 2022 92  % Final    Baseline Systolic BP  995  mmHg Final    Baseline Diastolic BP  76  mmHg Final    Baseline O2 Sat 2022 98  % Final    Stress O2 Sat 2022 100  % Final    Angina Index 2022 0   Final    Haley Treadmill Score 2022 9   Final    Stress ST Depression 2022 0  mm Final   Ancillary Procedure on 2022   Component Date Value Ref Range Status    IVSd 2022 1.0  0.6 - 1.0 cm Final    LVIDd 2022 3.7* 4.2 - 5.9 cm Final    LVIDs 2022 2.5  cm Final    LVOT Diameter 2022 2.1  cm Final    LVPWd 2022 0.9  0.6 - 1.0 cm Final    LVOT SV 2022 85.5  ml Final    EF BP 2022 65  55 - 100 % Final    LV Ejection Fraction A2C 03/07/2022 65  % Final    LV Ejection Fraction A4C 03/07/2022 63  % Final    LV EDV A2C 03/07/2022 82  mL Final    LV EDV A4C 03/07/2022 104  mL Final    LV EDV BP 03/07/2022 94  67 - 155 mL Final    LV ESV A2C 03/07/2022 29  mL Final    LV ESV A4C 03/07/2022 38  mL Final    LV ESV BP 03/07/2022 33  22 - 58 mL Final    LVOT Peak Gradient 03/07/2022 6  mmHg Final    LVOT Mean Gradient 03/07/2022 3  mmHg Final    LVOT Peak Velocity 03/07/2022 1.2  m/s Final    LVOT VTI 03/07/2022 24.7  cm Final    RVSP 03/07/2022 30  mmHg Final    RV Free Wall Peak S' 03/07/2022 13  cm/s Final    LA Diameter 03/07/2022 4.3  cm Final    LA Volume A/L 03/07/2022 54  mL Final    LA Volume 2C 03/07/2022 54  18 - 58 mL Final    LA Volume 4C 03/07/2022 45  18 - 58 mL Final    LA Volume BP 03/07/2022 50  18 - 58 mL Final    RA Minor Axis 03/07/2022 4.1  cm Final    Est. RA Pressure 03/07/2022 3  mmHg Final    AV Area by Peak Velocity 03/07/2022 2.6  cm2 Final    AV Area by Peak Velocity 03/07/2022 2.6  cm2 Final    AV Area by VTI 03/07/2022 2.4  cm2 Final    AV Area by VTI 03/07/2022 2.4  cm2 Final    AV Peak Gradient 03/07/2022 10  mmHg Final    AV Mean Gradient 03/07/2022 6  mmHg Final    AV Peak Velocity 03/07/2022 1.6  m/s Final    AV Mean Velocity 03/07/2022 1.1  m/s Final    AV VTI 03/07/2022 35.3  cm Final    MV A Velocity 03/07/2022 1.15  m/s Final    MV E Wave Deceleration Time 03/07/2022 201.2  ms Final    MV E Velocity 03/07/2022 1.04  m/s Final    LV E' Lateral Velocity 03/07/2022 8  cm/s Final    LV E' Septal Velocity 03/07/2022 7  cm/s Final    MV PHT 03/07/2022 58.3  ms Final    MV Area by PHT 03/07/2022 3.8  cm2 Final    TAPSE 03/07/2022 2.7* 1.5 - 2.0 cm Final    TR Peak Gradient 03/07/2022 27  mmHg Final    TR Max Velocity 03/07/2022 2.60  m/s Final    Aortic Root 03/07/2022 3.2  cm Final    Fractional Shortening 2D 03/07/2022 32  28 - 44 % Final    LV ESV Index BP 03/07/2022 18  mL/m2 Final    LV EDV Index BP 03/07/2022 50  mL/m2 Final    LV ESV Index A4C 03/07/2022 20  mL/m2 Final    LV EDV Index A4C 03/07/2022 56  mL/m2 Final    LV ESV Index A2C 03/07/2022 16  mL/m2 Final    LV EDV Index A2C 03/07/2022 44  mL/m2 Final    LVIDd Index 03/07/2022 1.98  cm/m2 Final    LVIDs Index 03/07/2022 1.34  cm/m2 Final    LV RWT Ratio 03/07/2022 0.49   Final    LV Mass 2D 03/07/2022 104.6  88 - 224 g Final    LV Mass 2D Index 03/07/2022 55.9  49 - 115 g/m2 Final    MV E/A 03/07/2022 0.90   Final    E/E' Ratio (Averaged) 03/07/2022 13.93   Final    E/E' Lateral 03/07/2022 13.00   Final    E/E' Septal 03/07/2022 14.86   Final    LA Volume Index A/L 03/07/2022 29  16 - 34 mL/m2 Final    LVOT Stroke Volume Index 03/07/2022 45.7  mL/m2 Final    LVOT Area 03/07/2022 3.5  cm2 Final    LA Volume Index 2C 03/07/2022 29  16 - 34 mL/m2 Final    LA Volume Index 4C 03/07/2022 24  16 - 34 mL/m2 Final    LA Size Index 03/07/2022 2.30  cm/m2 Final    LA/AO Root Ratio 03/07/2022 1.34   Final    RA Minor Axis Index 03/07/2022 2.19  cm/m2 Final    Ao Root Index 03/07/2022 1.71  cm/m2 Final    AV Velocity Ratio 03/07/2022 0.75   Final    LVOT:AV VTI Index 03/07/2022 0.70   Final    LA Volume Index BP 03/07/2022 27  16 - 34 ml/m2 Final         Sahara Balderas MD  [facility]  06/02/22 10:03 AM    Portions of this note may have been populated using smart dictation software and may have \"sounds-like\" errors present. Pt was counseled on risks, benefits and alternatives of treatment options. All questions were asked and answered and the patient was agreeable with the treatment plan as outlined.

## 2022-06-03 ENCOUNTER — TELEPHONE (OUTPATIENT)
Dept: FAMILY MEDICINE CLINIC | Age: 77
End: 2022-06-03

## 2022-06-03 NOTE — LETTER
6/3/2022 12:31 PM    Mr. Marie Aldridge  [address]  [place]  Phone: [phone]  Fax: [phone]    6/3/2022     Marie Aldridge   1945   [address] [facility] [address]      To Whom It May Concern,    The above-named patient is receiving medical care at [facility]. Please fax a copy of the following document(s) for continuity of care. Fax number :  [phone]     - Shingles vaccine records     We look forward to partnering with you to provide collaborative patient care. Please reach out to our office at [phone] if there are questions regarding this request.    Sincerely,    Sima Kirkland and Bessy [address]

## 2022-06-03 NOTE — TELEPHONE ENCOUNTER
----- Message from Racheal Rubio MD sent at 6/2/2022 10:45 AM EDT -----  Regarding: pls req records  Contact [facility] for his shingles shots

## 2022-06-07 DIAGNOSIS — R97.20 PSA ELEVATION: ICD-10-CM

## 2022-06-07 DIAGNOSIS — R73.01 IFG (IMPAIRED FASTING GLUCOSE): ICD-10-CM

## 2022-06-07 DIAGNOSIS — Z11.59 NEED FOR HEPATITIS C SCREENING TEST: ICD-10-CM

## 2022-06-07 DIAGNOSIS — Z12.5 SCREENING FOR PROSTATE CANCER: ICD-10-CM

## 2022-06-07 DIAGNOSIS — E78.5 HYPERLIPIDEMIA, UNSPECIFIED HYPERLIPIDEMIA TYPE: ICD-10-CM

## 2022-06-07 DIAGNOSIS — E55.9 VITAMIN D INSUFFICIENCY: ICD-10-CM

## 2022-06-07 DIAGNOSIS — Z87.39 HISTORY OF GOUT: ICD-10-CM

## 2022-06-07 DIAGNOSIS — I10 BENIGN ESSENTIAL HTN: ICD-10-CM

## 2022-06-07 LAB
25(OH)D3 SERPL-MCNC: 39.2 NG/ML (ref 30–100)
ALBUMIN SERPL-MCNC: 4.2 G/DL (ref 3.5–5)
ALBUMIN/GLOB SERPL: 1.4 {RATIO} (ref 1.1–2.2)
ALP SERPL-CCNC: 32 U/L (ref 45–117)
ALT SERPL-CCNC: 29 U/L (ref 12–78)
ANION GAP SERPL CALC-SCNC: 8 MMOL/L (ref 5–15)
AST SERPL-CCNC: 19 U/L (ref 15–37)
BILIRUB SERPL-MCNC: 0.5 MG/DL (ref 0.2–1)
BUN SERPL-MCNC: 18 MG/DL (ref 6–20)
BUN/CREAT SERPL: 16 (ref 12–20)
CALCIUM SERPL-MCNC: 10 MG/DL (ref 8.5–10.1)
CHLORIDE SERPL-SCNC: 103 MMOL/L (ref 97–108)
CHOLEST SERPL-MCNC: 135 MG/DL
CO2 SERPL-SCNC: 29 MMOL/L (ref 21–32)
CREAT SERPL-MCNC: 1.1 MG/DL (ref 0.7–1.3)
ERYTHROCYTE [DISTWIDTH] IN BLOOD BY AUTOMATED COUNT: 12.7 % (ref 11.5–14.5)
EST. AVERAGE GLUCOSE BLD GHB EST-MCNC: 120 MG/DL
GLOBULIN SER CALC-MCNC: 2.9 G/DL (ref 2–4)
GLUCOSE SERPL-MCNC: 121 MG/DL (ref 65–100)
HBA1C MFR BLD: 5.8 % (ref 4–5.6)
HCT VFR BLD AUTO: 43.8 % (ref 36.6–50.3)
HCV AB SERPL QL IA: NONREACTIVE
HDLC SERPL-MCNC: 58 MG/DL
HDLC SERPL: 2.3 {RATIO} (ref 0–5)
HGB BLD-MCNC: 14.5 G/DL (ref 12.1–17)
LDLC SERPL CALC-MCNC: 64.4 MG/DL (ref 0–100)
MCH RBC QN AUTO: 33.3 PG (ref 26–34)
MCHC RBC AUTO-ENTMCNC: 33.1 G/DL (ref 30–36.5)
MCV RBC AUTO: 100.7 FL (ref 80–99)
NRBC # BLD: 0 K/UL (ref 0–0.01)
NRBC BLD-RTO: 0 PER 100 WBC
PLATELET # BLD AUTO: 240 K/UL (ref 150–400)
PMV BLD AUTO: 9.4 FL (ref 8.9–12.9)
POTASSIUM SERPL-SCNC: 4.2 MMOL/L (ref 3.5–5.1)
PROT SERPL-MCNC: 7.1 G/DL (ref 6.4–8.2)
PSA SERPL-MCNC: 2.9 NG/ML (ref 0.01–4)
RBC # BLD AUTO: 4.35 M/UL (ref 4.1–5.7)
SODIUM SERPL-SCNC: 140 MMOL/L (ref 136–145)
TRIGL SERPL-MCNC: 63 MG/DL (ref ?–150)
URATE SERPL-MCNC: 6.9 MG/DL (ref 3.5–7.2)
VLDLC SERPL CALC-MCNC: 12.6 MG/DL
WBC # BLD AUTO: 5.1 K/UL (ref 4.1–11.1)

## 2022-06-09 ENCOUNTER — HOSPITAL ENCOUNTER (OUTPATIENT)
Dept: PHYSICAL THERAPY | Age: 77
Discharge: HOME | End: 2022-06-09
Payer: MEDICARE

## 2022-06-09 PROCEDURE — 97162 PT EVAL MOD COMPLEX 30 MIN: CPT | Performed by: PHYSICAL THERAPIST

## 2022-06-09 PROCEDURE — 97110 THERAPEUTIC EXERCISES: CPT | Performed by: PHYSICAL THERAPIST

## 2022-06-09 NOTE — PROGRESS NOTES
PT INITIAL EVALUATION NOTE - [facility] 2-15    Patient Name: Uriel Solid  Date:2022  : 1945  [x]  Patient  Verified  Payor: Marcy Borrego / Plan: [place] MED[place] PART [place] & B / Product Type: Medicare /    In time: 10:15  Out time: 11:00   Total Treatment Time (min): 45  Total Timed Codes (min): 25  1:1 Treatment Time ( [address] only): 25  Visit #: 1    Treatment Area: Bilateral shoulder pain [M25.511, M25.512]    SUBJECTIVE  Pain Level (0-10 scale): 1  Any medication changes, allergies to medications, adverse drug reactions, diagnosis change, or new procedure performed?: [] No    [x] Yes (see summary sheet for update)  Subjective:    Pt reports weakness in the L shoulder. He reports RC pathology w onset in May 2022, sx occurred when he was lifting. Surgical repair of R RC. Pain is low, but weakness is present. No numbness or tingling in the L arm, only in the R. PLOF: indoor cycling, balance exercises   Mechanism of Injury: lifting suitcase into overhead compartment on airplane   Previous Treatment/Compliance: PT before for the L shoulder   PMHx/Surgical Hx: PT for L shoulder before  Work Hx: higher eductatoin  Living Situation: lives alone, needs help with carrying for ADL's  Pt Goals: I want helpful exercises to increase shoulder strength. Barriers: none  Motivation: motivated  Substance use: none  Cognition: A & O x 4        OBJECTIVE/EXAMINATION  Posture:  Normal   Palpation: painful along biceps muscle belly, not painful at origin or insertion  Scapulohumoral Control / Rhythm:   Able to eccentrically lower with good control?  Left: [] Yes  [x] No         Right: [x] Yes  [] No        Shoulder AROM, PROM:         R   L  Shoulder Flexion  WNL   150    Shoulder Abduction  WNL    140  Shoulder Extension   WNL   NT  Shoulder ER   WNL   50   Shoulder IR   WNL   90          MMT:    5/5 on R grossly  L side:    Shoulder flexion  4   Shoulder abd   Painful, 3+   Shoulder ER   4   Shoulder IR    4, painful    Elbow flex   4    Elbow ext   4   Wrist flex   4   Wrist ext    4   Intrinsic   4     Neurological: Sensations: not tested, no numbness and tingling reported on L side     Special Tests:   Painful Arc: painful at any height above 90    Neer's: pos   Empty/Full Can Test: Empty can pos    Drop Arm: neg    ER Lag: neg     Modality rationale: Pt declined    Min Type Additional Details    [] Estim: []Att   []Unatt        []TENS instruct                  []IFC  []Premod   []NMES                     []Other:  []w/US   []w/ice   []w/heat  Position:  Location:    []  Traction: [] Cervical       []Lumbar                       [] Prone          []Supine                       []Intermittent   []Continuous Lbs:  [] before manual  [] after manual  []w/heat    []  Ultrasound: []Continuous   [] Pulsed at:                           []1MHz   []3MHz Location:  W/cm2:    [] Paraffin         Location:   []w/heat    []  Ice     []  Heat  []  Ice massage Position:  Location:    []  Laser  []  Other: Position:  Location:      []  Vasopneumatic Device Pressure:       [] lo [] med [] hi   Temperature:      [x] Skin assessment post-treatment:  [x]intact []redness- no adverse reaction    []redness - adverse reaction:     25 min Therapeutic Exercise:  [x] See flow sheet :   Rationale: increase ROM, increase strength and improve coordination to improve the patients ability to place objects on a shelf overhead.            With   [] TE   [] TA   [] Neuro   [] SC   [] other: Patient Education: [x] Review HEP    [] Progressed/Changed HEP based on:   [] positioning   [] body mechanics   [] transfers   [] heat/ice application    [] other:      Other Objective/Functional Measures: FOTO Functional Measure: 67/100     Pain Level (0-10 scale) post treatment: 1    ASSESSMENT/Changes in Function:     [x]  See Plan of Care      Mike Bowers, ROD 6/9/2022   2 units therex

## 2022-06-09 NOTE — PROGRESS NOTES
Physical Therapy at [facility],   a part of  [facility]  [address] [facility] [place]  Phone: [phone]  Fax: [phone]    Plan of Care/Statement of Necessity for Physical Therapy Services  2-15    Patient name: Ramsey Williamson  : 1945  Provider#: [id number]  Referral source: Basia Estrada MD      Medical/Treatment Diagnosis: Bilateral shoulder pain [M25.511, M25.512]     Prior Hospitalization: see medical history     Comorbidities: R RC repair, high blood pressure, hearing impairment   Prior Level of Function: able to complete ADL without pain, able to lift light objects overhead   Medications: Verified on Patient Summary List  Start of Care: 22      Onset Date: May 2022  The [address] [place] [address] and following information is based on the information from the initial evaluation. Assessment/ key information:  Pt presents today with L shoulder pain, which he reports is secondary to [facility] injury. His chief complaint is weakness of the L UE. Pt denies numbness or tingling on the L side and notes pain in the L shoulder and proximal biceps area. TTP biceps muscle belly. Pt reports wanting to try HEP at home for 2 weeks before scheduling another visit. Evaluation Complexity History MEDIUM  Complexity : 1-2 comorbidities / personal factors will impact the outcome/ POC ; Examination MEDIUM Complexity : 3 Standardized tests and measures addressing body structure, function, activity limitation and / or participation in recreation  ;Presentation MEDIUM Complexity : Evolving with changing characteristics  ; Clinical Decision Making MEDIUM Complexity : FOTO score of 26-74  Overall Complexity Rating: MEDIUM    Problem List: pain affecting function, decrease ROM, decrease strength, decrease ADL/ functional abilitiies and decrease activity tolerance   Treatment Plan may include any combination of the following: Therapeutic exercise, Therapeutic activities, Neuromuscular re-education, Physical agent/modality, Manual therapy, Patient education and Self Care training  Patient / Family readiness to learn indicated by: asking questions, trying to perform skills and interest  Persons(s) to be included in education: patient (P)  Barriers to Learning/Limitations: None  Patient Goal (s): I want my arm to be stronger so I can carry groceries by myself.   Patient Self Reported Health Status: excellent  Rehabilitation Potential: excellent    Short Term Goals: To be accomplished in 4 weeks:  1. In 4 weeks, Pt will be able to reach overhead to wash hair without >2/10 increase in pain. 2. In 4 weeks, Pt will be able to reach over to the passenger seat without >2/10 increase in pain. 3. In 4 weeks, pt will be able to  a light bag of groceries with L arm. Long Term Goals: To be accomplished in 12 weeks:  1. In 12 weeks, Pt will be able to place 5# object overhead without >2/10 increase in pain. 2. In 12 weeks, pt will be able to grab an object from the passenger seat without >2/10 increase in pain while driving. 3. In 12 weeks, pt will report picking up large bag of groceries with L arm. Frequency / Duration: Patient to be seen 1 times per week for 12 weeks. Patient/ Caregiver education and instruction: exercises    [x]  Plan of care has been reviewed with PTA        Certification Period: 06/09/22 - 09/09/2022  Shantell Leyva, [facility] 6/9/2022     ________________________________________________________________________    I certify that the above Therapy Services are being furnished while the patient is under my care. I agree with the treatment plan and certify that this therapy is necessary.     Physician's Signature:____________________  Date:____________Time: _________         Alma Delia Hsatings MD

## 2022-06-23 ENCOUNTER — HOSPITAL ENCOUNTER (OUTPATIENT)
Dept: PHYSICAL THERAPY | Age: 77
Discharge: HOME | End: 2022-06-23
Payer: MEDICARE

## 2022-06-23 PROCEDURE — 97110 THERAPEUTIC EXERCISES: CPT | Performed by: PHYSICAL THERAPIST

## 2022-06-23 NOTE — PROGRESS NOTES
PT DAILY TREATMENT NOTE - [facility] 2-15    Patient Name: Daylin Juarez  Date:2022  : 1945  [x]  Patient  Verified  Payor: Smith  / Plan: [place] MEDICARE PART A & B / Product Type: Medicare /    In time: 11:50  Out time: 12:30  Total Treatment Time (min): 40  Total Timed Codes (min): 40  1:1 Treatment Time ( [address] only): 40  Visit #:  2    Treatment Area: Bilateral shoulder pain [M25.511, M25.512]    SUBJECTIVE  Pain Level (0-10 scale): 0  Any medication changes, allergies to medications, adverse drug reactions, diagnosis change, or new procedure performed?: [x] No    [] Yes (see summary sheet for update)  Subjective functional status/changes:   [] No changes reported  Pt reports that he feels much stronger after 2 days of doing the exercises. He still has difficulty with heavy bags but is doing better carrying and reaching.      OBJECTIVE    Modality rationale: Pt declined    Min Type Additional Details       [] Estim: []Att   []Unatt    []TENS instruct                  []IFC  []Premod   []NMES                     []Other:  []w/US   []w/ice   []w/heat  Position:  Location:       []  Traction: [] Cervical       []Lumbar                       [] Prone          []Supine                       []Intermittent   []Continuous Lbs:  [] before manual  [] after manual  []w/heat    []  Ultrasound: []Continuous   [] Pulsed                       at: []1MHz   []3MHz Location:  W/cm2:    [] Paraffin         Location:   []w/heat    []  Ice     []  Heat  []  Ice massage Position:  Location:    []  Laser  []  Other: Position:  Location:      []  Vasopneumatic Device Pressure:       [] lo [] med [] hi   Temperature:      [x] Skin assessment post-treatment:  [x]intact []redness- no adverse reaction    []redness - adverse reaction:     40 min Therapeutic Exercise:  [] See flow sheet :   Rationale: increase ROM, increase strength and improve coordination to improve the patients ability to reach, lift, carry complete ADL's independently. With   [] TE   [] TA   [] Neuro   [] SC   [] other: Patient Education: [x] Review HEP    [] Progressed/Changed HEP based on:   [] positioning   [] body mechanics   [] transfers   [] heat/ice application    [] other:      Other Objective/Functional Measures:      Pain Level (0-10 scale) post treatment: 0    ASSESSMENT/Changes in Function:   Pt reports that he feels stronger after completing his therex given at the IE. He would like to continue to schedule 2 weeks between visits. Pt is still demonstrating limitation with ER on the L, tolerates AROM ER therex well. Pt reports more ease with reaching and lifting light objects and appears to be very compliant with HEP. He tolerates new exercise progressions well. Patient will continue to benefit from skilled PT services to modify and progress therapeutic interventions, address ROM deficits, address strength deficits, analyze and address soft tissue restrictions, analyze and modify body mechanics/ergonomics and assess and modify postural abnormalities to attain remaining goals. []  See Plan of Care  []  See progress note/recertification  []  See Discharge Summary         Progress towards goals / Updated goals:  Pt reports he is able to reach with less limitation, and can  light objects without difficulty. PLAN  [x]  Upgrade activities as tolerated     [x]  Continue plan of care  [x]  Update interventions per flow sheet       []  Discharge due to:_  []  Other:_      Hunter Lira, SPT 6/23/2022   Carito Manuel PT , DPT, OCS, Cert.  DN 6/23/2022

## 2022-07-07 ENCOUNTER — HOSPITAL ENCOUNTER (OUTPATIENT)
Dept: PHYSICAL THERAPY | Age: 77
Discharge: HOME | End: 2022-07-07
Payer: MEDICARE

## 2022-07-07 PROCEDURE — 97110 THERAPEUTIC EXERCISES: CPT | Performed by: PHYSICAL THERAPIST

## 2022-07-07 NOTE — PROGRESS NOTES
PT DAILY TREATMENT NOTE - [facility] 2-15    Patient Name: Neysa Meckel  Date:2022  : 1945  [x]  Patient  Verified  Payor: Mitch Gonsalez / Plan: [place] MEDICARE PART A & B / Product Type: Medicare /    In time: 3:30 Out time: 4:10  Total Treatment Time (min): 40  Total Timed Codes (min): 40  1:1 Treatment Time ( only): 40  Visit #:  3    Treatment Area: Bilateral shoulder pain [M25.511, M25.512]    SUBJECTIVE  Pain Level (0-10 scale): 3  Any medication changes, allergies to medications, adverse drug reactions, diagnosis change, or new procedure performed?: [x] No    [] Yes (see summary sheet for update)  Subjective functional status/changes:   [] No changes reported  Pt reports that he \"overdid it\" the day before yesterday because he had to exchange the propane in his grill. L shoulder is bothering him more today. OBJECTIVE    Modality rationale: pt declined    Min Type Additional Details       [] Estim: []Att   []Unatt    []TENS instruct                  []IFC  []Premod   []NMES                     []Other:  []w/US   []w/ice   []w/heat  Position:  Location:       []  Traction: [] Cervical       []Lumbar                       [] Prone          []Supine                       []Intermittent   []Continuous Lbs:  [] before manual  [] after manual  []w/heat    []  Ultrasound: []Continuous   [] Pulsed                       at: []1MHz   []3MHz Location:  W/cm2:    [] Paraffin         Location:   []w/heat    []  Ice     []  Heat  []  Ice massage Position:  Location:    []  Laser  []  Other: Position:  Location:      []  Vasopneumatic Device Pressure:       [] lo [] med [] hi   Temperature:      [x] Skin assessment post-treatment:  [x]intact []redness- no adverse reaction    []redness - adverse reaction:     40 min Therapeutic Exercise:  [x] See flow sheet :   Rationale: increase ROM, increase strength and improve coordination to improve the patients ability to reach, lift, carry, complete ADL's.         With   [x] TE   [] TA   [] Neuro   [] SC   [] other: Patient Education: [x] Review HEP    [x] Progressed/Changed HEP based on:   [x] positioning   [x] body mechanics   [] transfers   [] heat/ice application    [] other:      Other Objective/Functional Measures:   Pain with arm bike at 5 min     Pain Level (0-10 scale) post treatment: 3    ASSESSMENT/Changes in Function:   Pt reports some increase in pain today due to lifting a propane tank over the weekend. He reports that his pain was minimal before lifting the tank. Pt met all ST goals at this point. Pt has 1 more visit scheduled for July 13, then will take a 6 week vacation. Next week will be his last visit, and he can reach back out to the clinic if he is having problems in August.   Patient will continue to benefit from skilled PT services to modify and progress therapeutic interventions, address functional mobility deficits, address ROM deficits, address strength deficits, analyze and cue movement patterns and analyze and modify body mechanics/ergonomics to attain remaining goals. []  See Plan of Care  []  See progress note/recertification  []  See Discharge Summary         Progress towards goals / Updated goals:  1. In 4 weeks, Pt will be able to reach overhead to wash hair without >2/10 increase in pain. MET  2. In 4 weeks, Pt will be able to reach over to the passenger seat without >2/10 increase in pain. MET  3. In 4 weeks, pt will be able to  a light bag of groceries with L arm. MET  Long Term Goals: To be accomplished in 12 weeks:  1. In 12 weeks, Pt will be able to place 5# object overhead without >2/10 increase in pain. 3. In 12 weeks, pt will report picking up large bag of groceries with L arm. PLAN  [x]  Upgrade activities as tolerated     [x]  Continue plan of care  [x]  Update interventions per flow sheet       []  Discharge due to:_  []  Other:_      Stoney Party, SPT 7/7/2022   Maureen Gerard PT , DPT, OCS, Cert.  DN    7/7/2022

## 2022-07-13 ENCOUNTER — HOSPITAL ENCOUNTER (OUTPATIENT)
Dept: PHYSICAL THERAPY | Age: 77
Discharge: HOME | End: 2022-07-13
Payer: MEDICARE

## 2022-07-13 PROCEDURE — 97110 THERAPEUTIC EXERCISES: CPT | Performed by: PHYSICAL THERAPIST

## 2022-07-13 NOTE — PROGRESS NOTES
Physical Therapy at [facility],   a part of  [facility]  [address] [facility], [address]  Phone: [phone]      Fax:  [phone]    Progress Note    Name: Azam Bobby   : 1945   MD: Dipti Levy MD       Treatment Diagnosis: Bilateral shoulder pain [M25.511, M25.512]  Start of Care: 22    Visits from Start of Care: 4  Missed Visits: 0    Summary of Care:Mr. Jaramillo has tolerated a strengthening program of the L shoulder well, however has yet to show a significant change in strength or ROM. He will be on an extended vacation over the next two months. Assessment / Recommendations:     Short Term Goals: To be accomplished in 4 weeks:  1. In 4 weeks, Pt will be able to reach overhead to wash hair without >2/10 increase in pain. Not met. 2. In 4 weeks, Pt will be able to reach over to the passenger seat without >2/10 increase in pain. Not met. 3. In 4 weeks, pt will be able to  a light bag of groceries with L arm. Not met. Patient will continue his HEP for 8 weeks while on vacation.  He will return to therapy afterwards and we will progress rotator cuff and scapular strengthening program.        Yvette Subramanian, PT 2022

## 2022-07-13 NOTE — PROGRESS NOTES
PT DAILY TREATMENT NOTE - [facility] 2-15    Patient Name: Vi Gonzales  Date:2022  : 1945  [x]  Patient  Verified  Payor: Scott Hinton / Plan: [place] MEDICARE PART A & B / Product Type: Medicare /    In time: 11:50 AM Out time: 12:30 PM  Total Treatment Time (min): 40  Total Timed Codes (min): 40  1:1 Treatment Time ( [address] only): 40  Visit #:  4    Treatment Area: Bilateral shoulder pain [M25.511, M25.512]    SUBJECTIVE  Pain Level (0-10 scale): 3  Any medication changes, allergies to medications, adverse drug reactions, diagnosis change, or new procedure performed?: [x] No    [] Yes (see summary sheet for update)  Subjective functional status/changes:   [] No changes reported  Patient reports that he continues to have pain with any of the exercises that emphasize ER. He wants to continue performing his HEP on his trip and would like it updated for exercises that he can do that will not increase his pain.     OBJECTIVE    Modality rationale: pt declined    Min Type Additional Details       [] Estim: []Att   []Unatt    []TENS instruct                  []IFC  []Premod   []NMES                     []Other:  []w/US   []w/ice   []w/heat  Position:  Location:       []  Traction: [] Cervical       []Lumbar                       [] Prone          []Supine                       []Intermittent   []Continuous Lbs:  [] before manual  [] after manual  []w/heat    []  Ultrasound: []Continuous   [] Pulsed                       at: []1MHz   []3MHz Location:  W/cm2:    [] Paraffin         Location:   []w/heat    []  Ice     []  Heat  []  Ice massage Position:  Location:    []  Laser  []  Other: Position:  Location:      []  Vasopneumatic Device Pressure:       [] lo [] med [] hi   Temperature:      [x] Skin assessment post-treatment:  [x]intact []redness- no adverse reaction    []redness - adverse reaction:     40 min Therapeutic Exercise:  [x] See flow sheet :   Rationale: increase ROM, increase strength and improve coordination to improve the patients ability to reach, lift, carry, complete ADL's. With   [x] TE   [] TA   [] Neuro   [] SC   [] other: Patient Education: [x] Review HEP    [x] Progressed/Changed HEP based on:   [x] positioning   [x] body mechanics   [] transfers   [] heat/ice application    [] other:      Other Objective/Functional Measures:   Pain reported with no money's and cane ER    Pain Level (0-10 scale) post treatment: 3    ASSESSMENT/Changes in Function:   Held exercises that stress ER, but advances scapular strengthening exercises. Patient will continue to benefit from skilled PT services to modify and progress therapeutic interventions, address functional mobility deficits, address ROM deficits, address strength deficits, analyze and cue movement patterns and analyze and modify body mechanics/ergonomics to attain remaining goals.      []  See Plan of Care  [x]  See progress note/recertification  []  See Discharge Summary      PLAN  [x]  Upgrade activities as tolerated     [x]  Continue plan of care  [x]  Update interventions per flow sheet       []  Discharge due to:_  []  Other:_      Armida Diaz, PT 7/13/2022

## 2022-07-17 ENCOUNTER — HEALTH MAINTENANCE LETTER (OUTPATIENT)
Age: 77
End: 2022-07-17

## 2022-08-29 DIAGNOSIS — F33.1 MODERATE EPISODE OF RECURRENT MAJOR DEPRESSIVE DISORDER (HCC): ICD-10-CM

## 2022-08-29 DIAGNOSIS — E78.5 HYPERLIPIDEMIA, UNSPECIFIED HYPERLIPIDEMIA TYPE: ICD-10-CM

## 2022-08-29 DIAGNOSIS — I10 BENIGN ESSENTIAL HTN: ICD-10-CM

## 2022-08-29 RX ORDER — LOSARTAN POTASSIUM AND HYDROCHLOROTHIAZIDE 12.5; 5 MG/1; MG/1
1 TABLET ORAL 2 TIMES DAILY
Qty: 120 TABLET | Refills: 0 | Status: SHIPPED | OUTPATIENT
Start: 2022-08-29 | End: 2022-10-14 | Stop reason: SDUPTHER

## 2022-08-29 RX ORDER — BUPROPION HYDROCHLORIDE 300 MG/1
300 TABLET ORAL
Qty: 90 TABLET | Refills: 3 | Status: SHIPPED | OUTPATIENT
Start: 2022-08-29

## 2022-08-29 RX ORDER — ATORVASTATIN CALCIUM 20 MG/1
20 TABLET, FILM COATED ORAL DAILY
Qty: 90 TABLET | Refills: 3 | Status: SHIPPED | OUTPATIENT
Start: 2022-08-29

## 2022-08-29 RX ORDER — OMEPRAZOLE 20 MG/1
20 CAPSULE, DELAYED RELEASE ORAL EVERY OTHER DAY
Qty: 90 CAPSULE | Refills: 3 | Status: SHIPPED | OUTPATIENT
Start: 2022-08-29

## 2022-08-29 RX ORDER — ATENOLOL 50 MG/1
TABLET ORAL
Qty: 135 TABLET | Refills: 3 | Status: SHIPPED | OUTPATIENT
Start: 2022-08-29

## 2022-08-29 RX ORDER — ATORVASTATIN CALCIUM 40 MG/1
40 TABLET, FILM COATED ORAL DAILY
Qty: 180 TABLET | Refills: 3 | Status: SHIPPED | OUTPATIENT
Start: 2022-08-29

## 2022-08-29 RX ORDER — ATENOLOL 25 MG/1
25 TABLET ORAL
Qty: 90 TABLET | Refills: 3 | Status: SHIPPED | OUTPATIENT
Start: 2022-08-29

## 2022-09-01 ENCOUNTER — APPOINTMENT (OUTPATIENT)
Dept: PHYSICAL THERAPY | Age: 77
End: 2022-09-01

## 2022-09-25 ENCOUNTER — HEALTH MAINTENANCE LETTER (OUTPATIENT)
Age: 77
End: 2022-09-25

## 2022-10-14 ENCOUNTER — OFFICE VISIT (OUTPATIENT)
Dept: FAMILY MEDICINE CLINIC | Age: 77
End: 2022-10-14

## 2022-10-14 RX ORDER — LOSARTAN POTASSIUM AND HYDROCHLOROTHIAZIDE 12.5; 5 MG/1; MG/1
1 TABLET ORAL 2 TIMES DAILY
Qty: 180 TABLET | Refills: 2 | Status: SHIPPED | OUTPATIENT
Start: 2022-10-14

## 2022-10-25 ENCOUNTER — PATIENT MESSAGE (OUTPATIENT)
Dept: FAMILY MEDICINE CLINIC | Age: 77
End: 2022-10-25

## 2022-10-25 DIAGNOSIS — I10 BENIGN ESSENTIAL HTN: ICD-10-CM

## 2022-10-26 RX ORDER — METRONIDAZOLE 7.5 MG/G
LOTION TOPICAL 2 TIMES DAILY
Qty: 59 ML | Refills: 5 | Status: SHIPPED | OUTPATIENT
Start: 2022-10-26

## 2022-10-26 NOTE — TELEPHONE ENCOUNTER
From: Paolo Huertas  To: Sherri Gilmore MD  Sent: 10/25/2022 11:33 PM EDT  Subject: Refill of locked med. Dear Dr. Glendia Brittle:  Please refill my prescription for metroNIDAZOLE 0.75 % lotion. It is locked on the refill page. Thanks.   moo Huertas

## 2022-12-08 ENCOUNTER — TELEPHONE (OUTPATIENT)
Dept: FAMILY MEDICINE CLINIC | Age: 77
End: 2022-12-08

## 2022-12-08 NOTE — TELEPHONE ENCOUNTER
----- Message from Elmer Rolon sent at 11/28/2022  8:51 AM EST -----  Subject: Appointment Request    Reason for Call: Established Patient Appointment needed: Routine Medicare   AWV    QUESTIONS    Reason for appointment request? No appointments available during search     Additional Information for Provider? Pt tested positive for COVID 11/24 so   he had to cancel his AWV on 11/29. Needs to reschedule his appt whenever   possible.  Please call back whenever possible.   ---------------------------------------------------------------------------  --------------  Evelin AZEVEDO  [phone]; OK to leave message on voicemail  ---------------------------------------------------------------------------  --------------  SCRIPT ANSWERS  COVID Screen: Raquel Orr

## 2022-12-13 ENCOUNTER — OFFICE VISIT (OUTPATIENT)
Dept: FAMILY MEDICINE CLINIC | Age: 77
End: 2022-12-13
Payer: MEDICARE

## 2022-12-13 VITALS
RESPIRATION RATE: 16 BRPM | DIASTOLIC BLOOD PRESSURE: 62 MMHG | OXYGEN SATURATION: 98 % | SYSTOLIC BLOOD PRESSURE: 130 MMHG | TEMPERATURE: 97.9 F | HEART RATE: 57 BPM | WEIGHT: 156 LBS | HEIGHT: 67 IN | BODY MASS INDEX: 24.48 KG/M2

## 2022-12-13 DIAGNOSIS — I10 BENIGN ESSENTIAL HTN: ICD-10-CM

## 2022-12-13 DIAGNOSIS — Z87.39 HISTORY OF GOUT: ICD-10-CM

## 2022-12-13 DIAGNOSIS — Z00.00 MEDICARE ANNUAL WELLNESS VISIT, SUBSEQUENT: Primary | ICD-10-CM

## 2022-12-13 DIAGNOSIS — E78.5 HYPERLIPIDEMIA, UNSPECIFIED HYPERLIPIDEMIA TYPE: ICD-10-CM

## 2022-12-13 DIAGNOSIS — R73.01 IFG (IMPAIRED FASTING GLUCOSE): ICD-10-CM

## 2022-12-13 DIAGNOSIS — H53.2 DOUBLE VISION: ICD-10-CM

## 2022-12-13 DIAGNOSIS — Z71.89 ADVANCED DIRECTIVES, COUNSELING/DISCUSSION: ICD-10-CM

## 2022-12-13 DIAGNOSIS — E55.9 VITAMIN D INSUFFICIENCY: ICD-10-CM

## 2022-12-13 PROCEDURE — G0439 PPPS, SUBSEQ VISIT: HCPCS | Performed by: FAMILY MEDICINE

## 2022-12-13 PROCEDURE — 1101F PT FALLS ASSESS-DOCD LE1/YR: CPT | Performed by: FAMILY MEDICINE

## 2022-12-13 PROCEDURE — G9717 DOC PT DX DEP/BP F/U NT REQ: HCPCS | Performed by: FAMILY MEDICINE

## 2022-12-13 PROCEDURE — 3074F SYST BP LT 130 MM HG: CPT | Performed by: FAMILY MEDICINE

## 2022-12-13 PROCEDURE — 99214 OFFICE O/P EST MOD 30 MIN: CPT | Performed by: FAMILY MEDICINE

## 2022-12-13 PROCEDURE — G8752 SYS BP LESS 140: HCPCS | Performed by: FAMILY MEDICINE

## 2022-12-13 PROCEDURE — G8420 CALC BMI NORM PARAMETERS: HCPCS | Performed by: FAMILY MEDICINE

## 2022-12-13 PROCEDURE — G8754 DIAS BP LESS 90: HCPCS | Performed by: FAMILY MEDICINE

## 2022-12-13 PROCEDURE — G8536 NO DOC ELDER MAL SCRN: HCPCS | Performed by: FAMILY MEDICINE

## 2022-12-13 PROCEDURE — G8427 DOCREV CUR MEDS BY ELIG CLIN: HCPCS | Performed by: FAMILY MEDICINE

## 2022-12-13 PROCEDURE — 1123F ACP DISCUSS/DSCN MKR DOCD: CPT | Performed by: FAMILY MEDICINE

## 2022-12-13 PROCEDURE — 3078F DIAST BP <80 MM HG: CPT | Performed by: FAMILY MEDICINE

## 2022-12-13 RX ORDER — ALBUTEROL SULFATE 90 UG/1
2 AEROSOL, METERED RESPIRATORY (INHALATION)
Qty: 1 EACH | Refills: 2 | Status: SHIPPED | OUTPATIENT
Start: 2022-12-13

## 2022-12-13 NOTE — ACP (ADVANCE CARE PLANNING)
Advance Care Planning     Advance Care Planning (ACP) Physician/NP/PA Conversation      Date of Conversation: 12/13/2022  Conducted with: Patient with Decision Making Capacity    Healthcare Decision Maker:   No healthcare decision makers have been documented. Click here to complete [address] including selection of the Healthcare Decision Maker Relationship (ie \"Primary\")      Today we documented Decision Maker(s) consistent with ACP documents on file. Care Preferences:    Hospitalization: \"If your health worsens and it becomes clear that your chance of recovery is unlikely, what would be your preference regarding hospitalization? \"  The patient would prefer comfort-focused treatment without hospitalization. Ventilation: \"If you were unable to breathe on your own and your chance of recovery was unlikely, what would be your preference about the use of a ventilator (breathing machine) if it was available to you? \"   The patient would NOT desire the use of a ventilator. Resuscitation: \"In the event your heart stopped as a result of an underlying serious health condition, would you want attempts to be made to restart your heart, or would you prefer a natural death? \"   No, do NOT attempt to resuscitate.     Additional topics discussed: treatment goals, benefit/burden of treatment options, artificial nutrition, ventilation preferences, hospitalization preferences, resuscitation preferences, and end of life care preferences (vegetative state/imminent death)    Conversation Outcomes / Follow-Up Plan:   ACP in process - completing/providing documents   Reviewed DNR/DNI and patient confirms current DNR status - completed forms on file (place new order if needed)   Has a portable DNR will provide to us  Length of Voluntary ACP Conversation in minutes:  16 minutes    Florentino Robb MD

## 2022-12-13 NOTE — PATIENT INSTRUCTIONS
Debbie Ahr  Dr John Tam is the Ophthalmologist there  [phone]  Dr Olivier Olivas is the optometrist there

## 2022-12-13 NOTE — PROGRESS NOTES
Chief Complaint   Patient presents with    Annual Wellness Visit    Cough     1. Have you been to the ER, urgent care clinic since your last visit? Hospitalized since your last visit? No    2. Have you seen or consulted any other health care providers outside of the [address] [place] [address] since your last visit? Include any pap smears or colon screening.  No

## 2022-12-13 NOTE — PROGRESS NOTES
This is the Subsequent Medicare Annual Wellness Exam, performed 12 months or more after the Initial AWV or the last Subsequent AWV    I have reviewed the patient's medical history in detail and updated the computerized patient record. Assessment/Plan   Education and counseling provided:  Are appropriate based on today's review and evaluation    1. Medicare annual wellness visit, subsequent  2. Advanced directives, counseling/discussion  -     ADVANCE CARE PLANNING FIRST 27 MINS  -     DO NOT RESUSCITATE  3. Benign essential HTN  -     CBC W/O DIFF; Future  -     METABOLIC PANEL, COMPREHENSIVE; Future  -     LIPID PANEL; Future  4. Hyperlipidemia, unspecified hyperlipidemia type  -     METABOLIC PANEL, COMPREHENSIVE; Future  -     LIPID PANEL; Future  5. Vitamin D insufficiency  -     VITAMIN D, 25 HYDROXY; Future  6. IFG (impaired fasting glucose)  -     HEMOGLOBIN A1C WITH EAG; Future  -     CBC W/O DIFF; Future  -     METABOLIC PANEL, COMPREHENSIVE; Future  -     MICROALBUMIN, UR, RAND W/ MICROALB/CREAT RATIO; Future  7. History of gout  -     URIC ACID; Future  8. Double vision       Depression Risk Factor Screening     3 most recent PHQ Screens 12/13/2022   PHQ Not Done -   Little interest or pleasure in doing things Not at all   Feeling down, depressed, irritable, or hopeless Not at all   Total Score PHQ 2 0       Alcohol & Drug Abuse Risk Screen   Do you average more than 1 drink per night or more than 7 drinks a week?: No  In the past three months have you had more than 4 drinks containing alcohol on one occasion?: No            Functional Ability and Level of Safety   Hearing:  Hearing: Patient wears hearing aid      Activities of Daily Living: The home contains: no safety equipment, handrails, grab bars, rugs  Functional ADLs: Patient does total self care     Ambulation:  Patient ambulates: with no difficulty     Fall Risk:  Fall Risk Assessment, last 12 mths 12/13/2022   Able to walk?  Yes   Fall in past 12 months? 1   Do you feel unsteady? 0   Are you worried about falling 0   Is TUG test greater than 12 seconds? -   Is the gait abnormal? -   Number of falls in past 12 months 2   Fall with injury? 1     Abuse Screen:  Do you ever feel afraid of your partner?: No  Are you in a relationship with someone who physically or mentally threatens you?: No  Is it safe for you to go home?: Yes        Cognitive Screening   Has your family/caregiver stated any concerns about your memory?: No     Cognitive Screening: Normal - Verbal Fluency Test    Health Maintenance Due     Health Maintenance Due   Topic Date Due    Pneumococcal 65+ years (2 - PCV) 11/06/2015    COVID-19 Vaccine (5 - Booster for [employer] series) 05/28/2022       Patient Care Team   Patient Care Team:  Augusto Cazares MD as PCP - General (Family Medicine)  Augusto Cazares MD as PCP - REHABILITATION [facility] Empaneled Provider  Payam Bailey MD (Cardiovascular Disease Physician)  JEFRY is his eye doctor now Dr Canas Severe      History     Patient Active Problem List   Diagnosis Code    High coronary artery calcium score R93.1    Benign essential HTN I10    Depression, major F32.9    ED (erectile dysfunction) N52.9    GERD (gastroesophageal reflux disease) K21.9    Hyperlipidemia E78.5    Insomnia G47.00    Vitamin D insufficiency E55.9    Arm paresthesia, right R20.2    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 on wellburtrin.  failed prozac.  sees psych in [place]    ED (erectile dysfunction)     GERD (gastroesophageal reflux disease)     High coronary artery calcium score 2005    last stress test 2014;   CAC score 1400 in 2006 per patient    History of bronchitis     Hyperlipidemia     Insomnia     Personal history of multiple pulmonary nodules     no further f/u needed. followed by serial CTs, last 2010. 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  normal per pt in [address] Right 2013     Current Outpatient Medications   Medication Sig Dispense Refill    albuterol (PROVENTIL HFA, VENTOLIN HFA, PROAIR HFA) 90 mcg/actuation inhaler Take 2 Puffs by inhalation every four (4) hours as needed for Wheezing. Indications: bronchospasm prevention 1 Each 2    metroNIDAZOLE (METROLOTION) 0.75 % lotion Apply  to affected area two (2) times a day. 59 mL 5    losartan-hydroCHLOROthiazide (HYZAAR) 50-12.5 mg per tablet Take 1 Tablet by mouth two (2) times a day. 180 Tablet 2    atenoloL (TENORMIN) 25 mg tablet Take 1 Tablet by mouth nightly. 90 Tablet 3    atenoloL (TENORMIN) 50 mg tablet Take 1 pill in AM and 1/2 pill in  Tablet 3    atorvastatin (LIPITOR) 40 mg tablet Take 1 Tablet by mouth daily. Plus an extra half tab. Total 60 mg daily 180 Tablet 3    buPROPion XL (WELLBUTRIN XL) 300 mg XL tablet Take 1 Tablet by mouth every morning. 90 Tablet 3    omeprazole (PRILOSEC) 20 mg capsule Take 1 Capsule by mouth every other day. 90 Capsule 3    atorvastatin (LIPITOR) 20 mg tablet Take 1 Tablet by mouth daily. Takes 60 mg total 90 Tablet 3    fexofenadine (ALLEGRA) 180 mg tablet Take  by mouth. Seasonal      sildenafil citrate (VIAGRA) 50 mg tablet Take 1 Tab by mouth as needed. 8 Tab 3    Cholecalciferol, Vitamin D3, (VITAMIN D3) 2,000 unit cap capsule Take 2,000 Units by mouth daily.  30 Cap 11 aspirin delayed-release 81 mg tablet Take 1 Tab by mouth daily. 27 Tab 11     No Known Allergies    Family History   Problem Relation Age of Onset    Heart Disease Father     Hypertension Father     Heart Disease Brother      Social History     Tobacco Use    Smoking status: Former     Types: Cigarettes     Quit date: 1989     Years since quittin.9    Smokeless tobacco: Never   Substance Use Topics    Alcohol use: Yes     Alcohol/week: 7.0 standard drinks     Types: 7 Glasses of wine per week         Moustapha Gibson MD     Family Medicine Follow-Up Progress Note  Patient: Juan Christianson  1945, 76 y.o., male  Encounter Date: 2022    ASSESSMENT & PLAN    ICD-10-CM ICD-9-CM    1. Medicare annual wellness visit, subsequent  Z00.00 V70.0       2. Advanced directives, counseling/discussion  Z71.89 V65.49 ADVANCE CARE PLANNING FIRST 30 MINS      DO NOT RESUSCITATE      3. Benign essential HTN  I10 401.1 CBC W/O DIFF      METABOLIC PANEL, COMPREHENSIVE      LIPID PANEL      4. Hyperlipidemia, unspecified hyperlipidemia type  O19.5 747.7 METABOLIC PANEL, COMPREHENSIVE      LIPID PANEL      5. Vitamin D insufficiency  E55.9 268.9 VITAMIN D, 25 HYDROXY      6. IFG (impaired fasting glucose)  R73.01 790.21 HEMOGLOBIN A1C WITH EAG      CBC W/O DIFF      METABOLIC PANEL, COMPREHENSIVE      MICROALBUMIN, UR, RAND W/ MICROALB/CREAT RATIO      7. History of gout  Z87.39 V12.29 URIC ACID      8.  Double vision  H53.2 368.2           Orders Placed This Encounter    Order - Advanced Care Planning (16-30 minutes)    HEMOGLOBIN A1C WITH EAG     Standing Status:   Future     Standing Expiration Date:   2023    CBC W/O DIFF     Standing Status:   Future     Standing Expiration Date:       METABOLIC PANEL, COMPREHENSIVE     Standing Status:   Future     Standing Expiration Date:   2023    MICROALBUMIN, UR, RAND W/ MICROALB/CREAT RATIO     Standing Status:   Future     Standing Expiration Date: 12/13/2023    LIPID PANEL     Standing Status:   Future     Standing Expiration Date:   12/13/2023    VITAMIN D, 25 HYDROXY     Standing Status:   Future     Standing Expiration Date:   12/13/2023    URIC ACID     Standing Status:   Future     Standing Expiration Date:   12/13/2023    DO NOT RESUSCITATE    albuterol (PROVENTIL HFA, VENTOLIN HFA, PROAIR HFA) 90 mcg/actuation inhaler     Sig: Take 2 Puffs by inhalation every four (4) hours as needed for Wheezing. Indications: bronchospasm prevention     Dispense:  1 Each     Refill:  2       Patient Instructions   Debbie Ahr  Dr John Tam is the Ophthalmologist there  [phone]  Dr Olivier Olivas is the optometrist there  1. Medicare annual wellness visit, subsequent  Done today see note    2. Advanced directives, counseling/discussion  Will bring his DNR  - ADVANCE CARE PLANNING FIRST 27 MINS  - DO NOT RESUSCITATE    3. Benign essential HTN  Bp at goal, will watch, labs per orders  - CBC W/O DIFF; Future  - METABOLIC PANEL, COMPREHENSIVE; Future  - LIPID PANEL; Future    4. Hyperlipidemia, unspecified hyperlipidemia type  Continue statin, labs per orders  - METABOLIC PANEL, COMPREHENSIVE; Future  - LIPID PANEL; Future    5. Vitamin D insufficiency  Routine repeat b/c of historical decrease  - VITAMIN D, 25 HYDROXY; Future    6. IFG (impaired fasting glucose)  Repeat labs , healthy habits  - HEMOGLOBIN A1C WITH EAG; Future  - CBC W/O DIFF; Future  - METABOLIC PANEL, COMPREHENSIVE; Future  - MICROALBUMIN, UR, RAND W/ MICROALB/CREAT RATIO; Future    7. History of gout  No flare, recheck  - URIC ACID; Future    8. Double vision  Per ophthalmology    CHIEF COMPLAINT  Chief Complaint   Patient presents with    Annual Wellness Visit    Cough       SUBJECTIVE  Pascual Jones is a 76 y.o. male presenting today for follow up    Going to [facility] to see family for the holidays    HTN: patient reports stable on medications.  No chest pain, sob, headache, blurred vision, leg swelling, diaphoresis, falls. Compliant with medications as prescribed. is checking blood pressures at home and reports range is 120-130/60-70s. No significant hyper or hypotensive episodes that the patient reports today. Lost 8 lbs in the 5 days he had covid, he wants to stay below 160 right now, he's at 156  ROS  Review of Systems  A 12 point review of systems was negative except as noted here or in the HPI. OBJECTIVE  Visit Vitals  /62   Pulse (!) 57   Temp 97.9 °F (36.6 °C) (Temporal)   Resp 16   Ht 5' 7\" (1.702 m)   Wt 156 lb (70.8 kg)   SpO2 98%   BMI 24.43 kg/m²       Physical Exam  Vitals and nursing note reviewed. Constitutional:       General: He is not in acute distress. Appearance: Normal appearance. He is normal weight. He is not ill-appearing, toxic-appearing or diaphoretic. HENT:      Head: Normocephalic and atraumatic. Right Ear: External ear normal.      Left Ear: External ear normal.      Mouth/Throat:      Mouth: Mucous membranes are moist.   Eyes:      General: No scleral icterus. Cardiovascular:      Rate and Rhythm: Normal rate and regular rhythm. Pulses: Normal pulses. Heart sounds: Normal heart sounds. No friction rub. No gallop. Pulmonary:      Effort: Pulmonary effort is normal. No respiratory distress. Breath sounds: No stridor. No wheezing or rhonchi. Musculoskeletal:      Right lower leg: No edema. Left lower leg: No edema. Skin:     Coloration: Skin is not jaundiced or pale. Findings: No bruising, erythema, lesion or rash. Neurological:      General: No focal deficit present. Mental Status: He is alert. Cranial Nerves: No cranial nerve deficit. Gait: Gait normal.   Psychiatric:         Mood and Affect: Mood normal.         Behavior: Behavior normal.         Thought Content:  Thought content normal.         Judgment: Judgment normal.       No results found for any visits on 22. HISTORICAL  Reviewed and updated today, and as noted below:    Past Medical History:   Diagnosis Date    Arm paresthesia, right     from rototor cuff injury    Atherosclerosis     Benign essential HTN     Coronary artery disease, non-occlusive     Depression, major 1999    from divorce. on wellburtrin. failed prozac.  sees psych in [place]    ED (erectile dysfunction)     GERD (gastroesophageal reflux disease)     High coronary artery calcium score 2005    last stress test ;   CAC score 1400 in 2006 per patient    History of bronchitis     Hyperlipidemia     Insomnia     Personal history of multiple pulmonary nodules     no further f/u needed. followed by serial CTs, last . 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 150    HX COLONOSCOPY      normal    HX COLONOSCOPY      ?  normal per pt [address] Right      Family History   Problem Relation Age of Onset    Heart Disease Father     Hypertension Father     Heart Disease Brother      Social History     Tobacco Use   Smoking Status Former    Types: Cigarettes    Quit date: 1989    Years since quittin.9   Smokeless Tobacco Never     Social History     Socioeconomic History    Marital status:      Spouse name: Mary Bowie    Number of children: 2   Occupational History    Occupation: [employer]   Tobacco Use    Smoking status: Former     Types: Cigarettes     Quit date: 1989     Years since quittin.9    Smokeless tobacco: Never   Substance and Sexual Activity    Alcohol use:  Yes     Alcohol/week: 7.0 standard drinks     Types: 7 Glasses of wine per week    Drug use: No    Sexual activity: Never   Social History Narrative    2 step-daughters     Social Determinants of Health     Physical Activity: Insufficiently Active    Days of Exercise per Week: 4 days    Minutes of Exercise per Session: 30 min     No Known Allergies    LAB REVIEW  Lab Results   Component Value Date/Time    Sodium 140 06/07/2022 08:54 AM    Potassium 4.2 06/07/2022 08:54 AM    Chloride 103 06/07/2022 08:54 AM    CO2 29 06/07/2022 08:54 AM    Anion gap 8 06/07/2022 08:54 AM    Glucose 121 (H) 06/07/2022 08:54 AM    BUN 18 06/07/2022 08:54 AM    Creatinine 1.10 06/07/2022 08:54 AM    BUN/Creatinine ratio 16 06/07/2022 08:54 AM    GFR est AA >60 06/07/2022 08:54 AM    GFR est non-AA >60 06/07/2022 08:54 AM    Calcium 10.0 06/07/2022 08:54 AM    Bilirubin, total 0.5 06/07/2022 08:54 AM    Alk. phosphatase 32 (L) 06/07/2022 08:54 AM    Protein, total 7.1 06/07/2022 08:54 AM    Albumin 4.2 06/07/2022 08:54 AM    Globulin 2.9 06/07/2022 08:54 AM    A-G Ratio 1.4 06/07/2022 08:54 AM    ALT (SGPT) 29 06/07/2022 08:54 AM     Lab Results   Component Value Date/Time    WBC 5.1 06/07/2022 08:54 AM    HGB 14.5 06/07/2022 08:54 AM    HCT 43.8 06/07/2022 08:54 AM    PLATELET 076 41/76/9021 08:54 AM    .7 (H) 06/07/2022 08:54 AM     Lab Results   Component Value Date/Time    Hemoglobin A1c 5.8 (H) 06/07/2022 08:54 AM     Lab Results   Component Value Date/Time    Cholesterol, total 135 06/07/2022 08:54 AM    HDL Cholesterol 58 06/07/2022 08:54 AM    LDL, calculated 64.4 06/07/2022 08:54 AM    VLDL, calculated 12.6 06/07/2022 08:54 AM    Triglyceride 63 06/07/2022 08:54 AM    CHOL/HDL Ratio 2.3 06/07/2022 08:54 AM           Solange [facility] HospitalMD Ami Carlos [facility]  12/13/22 2:07 PM    Portions of this note may have been populated using smart dictation software and may have \"sounds-like\" errors present. Pt was counseled on risks, benefits and alternatives of treatment options.  All questions were asked and answered and the patient was agreeable with the treatment plan as outlined.

## 2023-01-30 ENCOUNTER — TELEPHONE (OUTPATIENT)
Dept: CARDIOLOGY CLINIC | Age: 78
End: 2023-01-30

## 2023-01-30 NOTE — TELEPHONE ENCOUNTER
Pt called in c/o HTN, increase in urination. Urination increased to about 8-9 times per day, clear. BP highest about 180/85. Confirmed Atenolol, Hyzaar dosages. Only a recent increase (since Thur/Fri) . When his BP goes up, tinnitis goes up every time. Usually takes BP a few times a week. Norm is 144/70. Reported he is under stressors with wife's plot in [facility] taken 2 years to get (just recently happened). This happened previously; had to temporarily increase Atenolol to 50 mg BID. Pt will send BP readings via mychart msg. OK with message back on [facility] instead of a phone call.

## 2023-02-02 ENCOUNTER — OFFICE VISIT (OUTPATIENT)
Dept: CARDIOLOGY CLINIC | Age: 78
End: 2023-02-02
Payer: MEDICARE

## 2023-02-02 VITALS
HEIGHT: 67 IN | WEIGHT: 158 LBS | SYSTOLIC BLOOD PRESSURE: 164 MMHG | HEART RATE: 70 BPM | BODY MASS INDEX: 24.8 KG/M2 | DIASTOLIC BLOOD PRESSURE: 70 MMHG | OXYGEN SATURATION: 99 %

## 2023-02-02 DIAGNOSIS — I34.0 MITRAL VALVE INSUFFICIENCY, UNSPECIFIED ETIOLOGY: ICD-10-CM

## 2023-02-02 DIAGNOSIS — R93.1 HIGH CORONARY ARTERY CALCIUM SCORE: ICD-10-CM

## 2023-02-02 DIAGNOSIS — I10 BENIGN ESSENTIAL HTN: Primary | ICD-10-CM

## 2023-02-02 PROCEDURE — 1101F PT FALLS ASSESS-DOCD LE1/YR: CPT | Performed by: SPECIALIST

## 2023-02-02 PROCEDURE — 99214 OFFICE O/P EST MOD 30 MIN: CPT | Performed by: SPECIALIST

## 2023-02-02 PROCEDURE — G9717 DOC PT DX DEP/BP F/U NT REQ: HCPCS | Performed by: SPECIALIST

## 2023-02-02 PROCEDURE — 93010 ELECTROCARDIOGRAM REPORT: CPT | Performed by: SPECIALIST

## 2023-02-02 PROCEDURE — 1123F ACP DISCUSS/DSCN MKR DOCD: CPT | Performed by: SPECIALIST

## 2023-02-02 PROCEDURE — G0463 HOSPITAL OUTPT CLINIC VISIT: HCPCS | Performed by: SPECIALIST

## 2023-02-02 PROCEDURE — G8427 DOCREV CUR MEDS BY ELIG CLIN: HCPCS | Performed by: SPECIALIST

## 2023-02-02 PROCEDURE — 93005 ELECTROCARDIOGRAM TRACING: CPT | Performed by: SPECIALIST

## 2023-02-02 PROCEDURE — G8420 CALC BMI NORM PARAMETERS: HCPCS | Performed by: SPECIALIST

## 2023-02-02 PROCEDURE — 3077F SYST BP >= 140 MM HG: CPT | Performed by: SPECIALIST

## 2023-02-02 PROCEDURE — G8536 NO DOC ELDER MAL SCRN: HCPCS | Performed by: SPECIALIST

## 2023-02-02 PROCEDURE — 3078F DIAST BP <80 MM HG: CPT | Performed by: SPECIALIST

## 2023-02-02 RX ORDER — ATENOLOL 50 MG/1
50 TABLET ORAL 2 TIMES DAILY
Qty: 180 TABLET | Refills: 3 | Status: SHIPPED | OUTPATIENT
Start: 2023-02-02

## 2023-02-02 NOTE — PROGRESS NOTES
Chief Complaint   Patient presents with    Follow-up     Annual     Hypertension    Coronary Artery Disease     Vitals:    02/02/23 1439 02/02/23 1450   BP: (!) 162/64 (!) 164/70   BP 1 Location: Left upper arm Right upper arm   BP Patient Position: Sitting Sitting   BP Cuff Size: Adult Adult   Pulse: 70    Height: 5' 7\" (1.702 m)    Weight: 158 lb (71.7 kg)    SpO2: 99%      Chest pain denied   SOB denied   Palpitations denied   Swelling in hands/feet denied   Dizziness denied   Recent hospital stays denied   Refills if going to stay on higher atenolol pm tablets, will need refill     111/66 this morning  134/70 last night  31st am 153/82  112/63 am  125/65  140-150 pm's  AM, is ok; PM is higher. Increased Atenolol in evening to 50 and BP has been better.

## 2023-02-02 NOTE — PROGRESS NOTES
Orders for See Debbie Cadet in 1 month.        See me in 1 year with an echo (MR) and treadmill stress test (elevated CAC score) per Dr. Devorah BAUTISTA.

## 2023-02-02 NOTE — PROGRESS NOTES
Monserrat Dennis MD. [facility]              Patient: Uyen Ngo  : 1945      Today's Date: 2023            HISTORY OF PRESENT ILLNESS:     History of Present Illness:    BP has been high past couple of weeks. Better today after increasing atenolol - 50 BID. No other complaints. PAST MEDICAL HISTORY:     Past Medical History:   Diagnosis Date    Arm paresthesia, right     from rototor cuff injury    Atherosclerosis     Benign essential HTN     Coronary artery disease, non-occlusive     Depression, major 1999    from divorce. on wellburtrin. failed prozac.  sees psych in [place]    ED (erectile dysfunction)     GERD (gastroesophageal reflux disease)     High coronary artery calcium score     last stress test ;   CAC score 1400 in  per patient    History of bronchitis     Hyperlipidemia     Insomnia     Personal history of multiple pulmonary nodules     no further f/u needed. followed by serial CTs, last . 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  normal per pt in [address] Right          MEDICATIONS:     Current Outpatient Medications   Medication Sig Dispense Refill    metroNIDAZOLE (METROLOTION) 0.75 % lotion Apply  to affected area two (2) times a day. 59 mL 5    losartan-hydroCHLOROthiazide (HYZAAR) 50-12.5 mg per tablet Take 1 Tablet by mouth two (2) times a day. 180 Tablet 2    atenoloL (TENORMIN) 25 mg tablet Take 1 Tablet by mouth nightly.  90 Tablet 3    atenoloL (TENORMIN) 50 mg tablet Take 1 pill in AM and 1/2 pill in  Tablet 3    atorvastatin (LIPITOR) 40 mg tablet Take 1 Tablet by mouth daily. Plus an extra half tab. Total 60 mg daily 180 Tablet 3    buPROPion XL (WELLBUTRIN XL) 300 mg XL tablet Take 1 Tablet by mouth every morning. 90 Tablet 3    omeprazole (PRILOSEC) 20 mg capsule Take 1 Capsule by mouth every other day. 90 Capsule 3    atorvastatin (LIPITOR) 20 mg tablet Take 1 Tablet by mouth daily. Takes 60 mg total 90 Tablet 3    fexofenadine (ALLEGRA) 180 mg tablet Take  by mouth. Seasonal      sildenafil citrate (VIAGRA) 50 mg tablet Take 1 Tab by mouth as needed. 8 Tab 3    Cholecalciferol, Vitamin D3, (VITAMIN D3) 2,000 unit cap capsule Take 2,000 Units by mouth daily. 30 Cap 11    aspirin delayed-release 81 mg tablet Take 1 Tab by mouth daily. 30 Tab 11    albuterol (PROVENTIL HFA, VENTOLIN HFA, PROAIR HFA) 90 mcg/actuation inhaler Take 2 Puffs by inhalation every four (4) hours as needed for Wheezing. Indications: bronchospasm prevention (Patient not taking: Reported on 2023) 1 Each 2     No Known Allergies        SOCIAL HISTORY:     Social History     Tobacco Use    Smoking status: Former     Types: Cigarettes     Quit date: 1989     Years since quittin.1    Smokeless tobacco: Never   Substance Use Topics    Alcohol use: Yes     Alcohol/week: 7.0 standard drinks     Types: 7 Glasses of wine per week    Drug use: No         FAMILY HISTORY:     Family History   Problem Relation Age of Onset    Heart Disease Father     Hypertension Father     Heart Disease Brother             REVIEW OF SYMPTOMS:      Review of Symptoms:  Constitutional: Negative for fever, chills  HEENT: Negative for nosebleeds, tinnitus, and vision changes. Respiratory: Negative for cough, wheezing  Cardiovascular: Negative for orthopnea, claudication, syncope, and PND. Gastrointestinal: Negative for abdominal pain, melena. Genitourinary: Negative for dysuria  Musculoskeletal: Negative for myalgias. Skin: Negative for rash  Heme: No problems bleeding.   Neurological: Negative for speech change and focal weakness. PHYSICAL EXAM:      Physical Exam:  Visit Vitals  BP (!) 164/70 (BP 1 Location: Right upper arm, BP Patient Position: Sitting, BP Cuff Size: Adult)   Pulse 70   Ht 5' 7\" (1.702 m)   Wt 158 lb (71.7 kg)   SpO2 99%   BMI 24.75 kg/m²       Patient appears generally well, mood and affect are appropriate and pleasant. HEENT:  Hearing intact, non-icteric, normocephalic, atraumatic. Neck Exam: Supple, No JVD or carotid bruits. Lung Exam: Clear to auscultation, even breath sounds. Cardiac Exam: Regular rate and rhythm with 1/6 systolic murmur  Abdomen: Soft, non-tender, normal bowel sounds. No bruits or masses. Extremities: Moves all ext well. No lower extremity edema. Psych: Appropriate affect  Neuro - Grossly intact           LABS / OTHER STUDIES:      Lab Results   Component Value Date/Time    Sodium 140 06/07/2022 08:54 AM    Potassium 4.2 06/07/2022 08:54 AM    Chloride 103 06/07/2022 08:54 AM    CO2 29 06/07/2022 08:54 AM    Anion gap 8 06/07/2022 08:54 AM    Glucose 121 (H) 06/07/2022 08:54 AM    BUN 18 06/07/2022 08:54 AM    Creatinine 1.10 06/07/2022 08:54 AM    BUN/Creatinine ratio 16 06/07/2022 08:54 AM    GFR est AA >60 06/07/2022 08:54 AM    GFR est non-AA >60 06/07/2022 08:54 AM    Calcium 10.0 06/07/2022 08:54 AM    Bilirubin, total 0.5 06/07/2022 08:54 AM    Alk.  phosphatase 32 (L) 06/07/2022 08:54 AM    Protein, total 7.1 06/07/2022 08:54 AM    Albumin 4.2 06/07/2022 08:54 AM    Globulin 2.9 06/07/2022 08:54 AM    A-G Ratio 1.4 06/07/2022 08:54 AM    ALT (SGPT) 29 06/07/2022 08:54 AM    AST (SGOT) 19 06/07/2022 08:54 AM     Lab Results   Component Value Date/Time    Cholesterol, total 135 06/07/2022 08:54 AM    HDL Cholesterol 58 06/07/2022 08:54 AM    LDL, calculated 64.4 06/07/2022 08:54 AM    VLDL, calculated 12.6 06/07/2022 08:54 AM    Triglyceride 63 06/07/2022 08:54 AM    CHOL/HDL Ratio 2.3 06/07/2022 08:54 AM             CARDIAC DIAGNOSTICS:      Cardiac Evaluation Includes:  EKG 3/2/18 - NSR, normal   EKG 2/15/22 - NSR, normal   EKG 2/2/23 - NSR, normal       Treadmill Stress test 3/5/18 - walked 10:55 (13.4 METS). Normal study     Echo 3/5/18 - LVEF 65%, mild-mod MAC and mild MR, AV sclerosis     Treadmill Stress test 3/7/22 - walked 9 min, normal study   Echo 3/7/22 - LVEF 60-65%, MAC, tr MR,  AV sclerosis               ASSESSMENT AND PLAN      Assessment and Plan:  1) Atherosclerosis   - CAC score 1400 in 2006 per patient.    - No prior CV event event. - Treadmill Stress test 3/7/22 - walked 9 min, normal study   - Echo 3/7/22 - LVEF 60-65%, MAC, tr MR,  AV sclerosis  - Denies any anginal complaints -- works out regularly. - check a treadmill stress test periodically given high CAC score     2) HTN  - BP goal < 130/80   - On 2/2/23 - he has had higher BP past few weeks - wife was put to rest recently and it has been more stressful - Will continue atenolol at 50 BID for now ----> follow BP at home --> can adjust meds further as needed. If BP is still high, then will increase losartan. 3) Dyslipidemia  - prior lipids OK (followed by PCP)   - cont statin     4)  Mild MR in past   - follow on serial studies      5) See NP in 1 month. See me in 1 year with an echo and treadmill stress test in 1 year. Originally from [facility]. 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 Consulted in [place] in 2019. Wife had stage 4 pancreatic cancer and treated at [facility] - she passed in 2021 (buried in [facility]) . Eloise Bazzi MD, [address]  [address], [address]      [place] [address]  [address]  [facility], [address] [place] [address]  Ph: [phone]                               Ph [phone]

## 2023-02-02 NOTE — PROGRESS NOTES
Chief Complaint   Patient presents with    Follow-up     Annual     Hypertension    Coronary Artery Disease     There were no vitals filed for this visit.     Chest pain denied     SOB denied     Palpitations denied     Swelling in hands/feet denied     Dizziness denied     Recent hospital stays denied     Refills denied no...

## 2023-02-02 NOTE — ADDENDUM NOTE
Addended by: Guera Garber on: 2/2/2023 03:40 PM     Modules accepted: Orders Pt arrived to unit. No complaints voiced. VSS. Pt afebrile. Tolerated Venofer. No reactions noted. Pt has next appt. Pt ambulated off unit. No distress noted.

## 2023-03-06 NOTE — PROGRESS NOTES
Patient: Yvonne Christianson  : 1945    Primary Cardiologist: Nani York MD. [facility]  Last Office Visit: 23      Today's Date: 3/7/2023      HISTORY OF PRESENT ILLNESS:     History of Present Illness:  Presents for BP follow-up. BP at home 120-130/60-70. Elevated in office today, tells me it is always elevated due to white coat syndrome. He is doing well overall. Denies cardiac complaints. PAST MEDICAL HISTORY:     Past Medical History:   Diagnosis Date    Arm paresthesia, right     from rototor cuff injury    Atherosclerosis     Benign essential HTN     Coronary artery disease, non-occlusive     Depression, major 1999    from divorce. on wellburtrin. failed prozac.  sees psych in [place]    ED (erectile dysfunction)     GERD (gastroesophageal reflux disease)     High coronary artery calcium score     last stress test ;   CAC score 1400 in  per patient    History of bronchitis     Hyperlipidemia     Insomnia     Personal history of multiple pulmonary nodules     no further f/u needed. followed by serial CTs, last . 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  normal per pt in [address] Right 2013         MEDICATIONS:     Current Outpatient Medications   Medication Sig Dispense Refill    atenoloL (TENORMIN) 50 mg tablet Take 1 Tablet by mouth two (2) times a day. 180 Tablet 3    metroNIDAZOLE (METROLOTION) 0.75 % lotion Apply  to affected area two (2) times a day. 59 mL 5    losartan-hydroCHLOROthiazide (HYZAAR) 50-12.5 mg per tablet Take 1 Tablet by mouth two (2) times a day.  180 Tablet 2    atorvastatin (LIPITOR) 40 mg tablet Take 1 Tablet by mouth daily. Plus an extra half tab. Total 60 mg daily 180 Tablet 3    buPROPion XL (WELLBUTRIN XL) 300 mg XL tablet Take 1 Tablet by mouth every morning. 90 Tablet 3    omeprazole (PRILOSEC) 20 mg capsule Take 1 Capsule by mouth every other day. 90 Capsule 3    atorvastatin (LIPITOR) 20 mg tablet Take 1 Tablet by mouth daily. Takes 60 mg total 90 Tablet 3    fexofenadine (ALLEGRA) 180 mg tablet Take  by mouth. Seasonal      sildenafil citrate (VIAGRA) 50 mg tablet Take 1 Tab by mouth as needed. 8 Tab 3    Cholecalciferol, Vitamin D3, (VITAMIN D3) 2,000 unit cap capsule Take 2,000 Units by mouth daily. 30 Cap 11    aspirin delayed-release 81 mg tablet Take 1 Tab by mouth daily. 30 Tab 11     No Known Allergies        SOCIAL HISTORY:     Social History     Tobacco Use    Smoking status: Former     Types: Cigarettes     Quit date: 1989     Years since quittin.2    Smokeless tobacco: Never   Substance Use Topics    Alcohol use: Yes     Alcohol/week: 7.0 standard drinks     Types: 7 Glasses of wine per week    Drug use: No         FAMILY HISTORY:     Family History   Problem Relation Age of Onset    Heart Disease Father     Hypertension Father     Heart Disease Brother             REVIEW OF SYMPTOMS:      Review of Symptoms:  Constitutional: Negative for fever, chills  HEENT: Negative for nosebleeds, tinnitus, and vision changes. Respiratory: Negative for cough, wheezing  Cardiovascular: Negative for orthopnea, claudication, syncope, and PND. Gastrointestinal: Negative for abdominal pain, melena. Genitourinary: Negative for dysuria  Musculoskeletal: Negative for myalgias. Skin: Negative for rash  Heme: No problems bleeding. Neurological: Negative for speech change and focal weakness.                PHYSICAL EXAM:      Physical Exam:  Visit Vitals  BP (!) 150/72 (BP 1 Location: Right upper arm)   Pulse 60   Ht 5' 7\" (1.702 m)   Wt 160 lb (72.6 kg)   SpO2 99%   BMI 25.06 kg/m²         Patient appears generally well, mood and affect are appropriate and pleasant. HEENT:  Hearing intact, non-icteric, normocephalic, atraumatic. Neck Exam: Supple, No JVD or carotid bruits. Lung Exam: Clear to auscultation, even breath sounds. Cardiac Exam: Regular rate and rhythm with 1/6 systolic murmur  Abdomen: Soft, non-tender, normal bowel sounds. No bruits or masses. Extremities: Moves all ext well. No lower extremity edema. Psych: Appropriate affect  Neuro - Grossly intact           LABS / OTHER STUDIES:      Lab Results   Component Value Date/Time    Sodium 140 06/07/2022 08:54 AM    Potassium 4.2 06/07/2022 08:54 AM    Chloride 103 06/07/2022 08:54 AM    CO2 29 06/07/2022 08:54 AM    Anion gap 8 06/07/2022 08:54 AM    Glucose 121 (H) 06/07/2022 08:54 AM    BUN 18 06/07/2022 08:54 AM    Creatinine 1.10 06/07/2022 08:54 AM    BUN/Creatinine ratio 16 06/07/2022 08:54 AM    GFR est AA >60 06/07/2022 08:54 AM    GFR est non-AA >60 06/07/2022 08:54 AM    Calcium 10.0 06/07/2022 08:54 AM    Bilirubin, total 0.5 06/07/2022 08:54 AM    Alk. phosphatase 32 (L) 06/07/2022 08:54 AM    Protein, total 7.1 06/07/2022 08:54 AM    Albumin 4.2 06/07/2022 08:54 AM    Globulin 2.9 06/07/2022 08:54 AM    A-G Ratio 1.4 06/07/2022 08:54 AM    ALT (SGPT) 29 06/07/2022 08:54 AM    AST (SGOT) 19 06/07/2022 08:54 AM     Lab Results   Component Value Date/Time    Cholesterol, total 135 06/07/2022 08:54 AM    HDL Cholesterol 58 06/07/2022 08:54 AM    LDL, calculated 64.4 06/07/2022 08:54 AM    VLDL, calculated 12.6 06/07/2022 08:54 AM    Triglyceride 63 06/07/2022 08:54 AM    CHOL/HDL Ratio 2.3 06/07/2022 08:54 AM             CARDIAC DIAGNOSTICS:      Cardiac Evaluation Includes:  EKG 3/2/18 - NSR, normal   EKG 2/15/22 - NSR, normal   EKG 2/2/23 - NSR, normal       Treadmill Stress test 3/5/18 - walked 10:55 (13.4 METS).   Normal study     Echo 3/5/18 - LVEF 65%, mild-mod MAC and mild MR, AV sclerosis Treadmill Stress test 3/7/22 - walked 9 min, normal study   Echo 3/7/22 - LVEF 60-65%, MAC, tr MR,  AV sclerosis               ASSESSMENT AND PLAN      Assessment and Plan:  1) Atherosclerosis   - CAC score 1400 in 2006 per patient.    - No prior CV event event. - Treadmill Stress test 3/7/22 - walked 9 min, normal study   - Echo 3/7/22 - LVEF 60-65%, MAC, tr MR,  AV sclerosis  - Denies any anginal complaints -- works out regularly. - check a treadmill stress test periodically given high CAC score     2) HTN  - BP goal < 130/80   - On 2/2/23 - he has had higher BP past few weeks - wife was put to rest recently and it has been more stressful - Will continue atenolol at 50 BID for now   - BP at home per BP log 120-130/60-70 -- no medication adjustments today     3) Dyslipidemia  - prior lipids OK (followed by PCP)   - cont statin     4)  Mild MR in past   - follow on serial studies      5) See Dr. Braxton Ricketts in 1 year with an echo and treadmill stress test.  Knows to contact office as needed. Originally from [place]. 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 Consulted in [place] in 2019. Wife had stage 4 pancreatic cancer and treated at [facility] - she passed in 2021 (buried in [facility]) . Mike Agosto, WILBERT    [address], [address]  [place] [address]  [address]  [place], [facility] [address]  [place], [address] [place] [address]  Ph: [phone]   Ph [phone]

## 2023-03-07 ENCOUNTER — OFFICE VISIT (OUTPATIENT)
Dept: CARDIOLOGY CLINIC | Age: 78
End: 2023-03-07
Payer: MEDICARE

## 2023-03-07 VITALS
SYSTOLIC BLOOD PRESSURE: 150 MMHG | HEIGHT: 67 IN | WEIGHT: 160 LBS | HEART RATE: 60 BPM | BODY MASS INDEX: 25.11 KG/M2 | DIASTOLIC BLOOD PRESSURE: 72 MMHG | OXYGEN SATURATION: 99 %

## 2023-03-07 DIAGNOSIS — I10 BENIGN ESSENTIAL HTN: ICD-10-CM

## 2023-03-07 DIAGNOSIS — E78.5 HYPERLIPIDEMIA, UNSPECIFIED HYPERLIPIDEMIA TYPE: ICD-10-CM

## 2023-03-07 DIAGNOSIS — I34.0 MITRAL VALVE INSUFFICIENCY, UNSPECIFIED ETIOLOGY: ICD-10-CM

## 2023-03-07 DIAGNOSIS — I70.90 ATHEROSCLEROSIS: Primary | ICD-10-CM

## 2023-03-07 PROCEDURE — 1123F ACP DISCUSS/DSCN MKR DOCD: CPT

## 2023-03-07 PROCEDURE — 99214 OFFICE O/P EST MOD 30 MIN: CPT

## 2023-03-07 PROCEDURE — 3077F SYST BP >= 140 MM HG: CPT

## 2023-03-07 PROCEDURE — 3078F DIAST BP <80 MM HG: CPT

## 2023-03-07 PROCEDURE — G0463 HOSPITAL OUTPT CLINIC VISIT: HCPCS

## 2023-03-07 NOTE — LETTER
3/7/2023    Patient: Brannon Bush   YOB: 1945   Date of Visit: 3/7/2023     Radha De Los Santos, [address] [facility]  [address]  [address] [place]    Dear Radha De Los Santos MD,      Thank you for referring Mr. Brannon Bush to [address] [place] [address] for evaluation. My notes for this consultation are attached. If you have questions, please do not hesitate to call me. I look forward to following your patient along with you.       Sincerely,    QUINN Hurst

## 2023-03-07 NOTE — PROGRESS NOTES
Chief Complaint   Patient presents with    Follow-up     1mo     Vitals:    03/07/23 1308 03/07/23 1311   BP: (!) 150/74 (!) 150/72   BP 1 Location: Left upper arm Right upper arm   Pulse: 60    Height: 5' 7\" (1.702 m)    Weight: 160 lb (72.6 kg)    SpO2: 99%      Chest pain denied   SOB denied   Palpitations denied   Swelling in hands/feet denied   Dizziness denied   Recent hospital stays denied   Refills denied

## 2023-03-07 NOTE — PROGRESS NOTES
Per Jodee-See Dr. Lupillo Ames in 1 year with an echo and treadmill stress test.  Knows to contact office as needed.

## 2023-05-17 ENCOUNTER — PATIENT MESSAGE (OUTPATIENT)
Facility: CLINIC | Age: 78
End: 2023-05-17

## 2023-05-17 DIAGNOSIS — S49.92XA INJURY OF LEFT SHOULDER, INITIAL ENCOUNTER: Primary | ICD-10-CM

## 2023-05-17 RX ORDER — OMEPRAZOLE 20 MG/1
20 CAPSULE, DELAYED RELEASE ORAL EVERY OTHER DAY
COMMUNITY
Start: 2022-08-29 | End: 2023-07-11 | Stop reason: SDUPTHER

## 2023-05-17 RX ORDER — METRONIDAZOLE 7.5 MG/G
LOTION TOPICAL 2 TIMES DAILY
COMMUNITY
Start: 2022-10-26

## 2023-05-17 RX ORDER — ATENOLOL 50 MG/1
50 TABLET ORAL 2 TIMES DAILY
COMMUNITY
Start: 2023-02-02 | End: 2023-07-11 | Stop reason: SDUPTHER

## 2023-05-17 RX ORDER — BUPROPION HYDROCHLORIDE 300 MG/1
1 TABLET ORAL EVERY MORNING
COMMUNITY
Start: 2022-08-29

## 2023-05-17 RX ORDER — ATORVASTATIN CALCIUM 40 MG/1
40 TABLET, FILM COATED ORAL DAILY
COMMUNITY
Start: 2022-08-29 | End: 2023-07-11 | Stop reason: SDUPTHER

## 2023-05-17 RX ORDER — FEXOFENADINE HCL 180 MG/1
TABLET ORAL
COMMUNITY
End: 2023-07-11

## 2023-05-17 RX ORDER — SILDENAFIL 50 MG/1
50 TABLET, FILM COATED ORAL PRN
COMMUNITY
Start: 2017-03-28

## 2023-05-17 RX ORDER — ASPIRIN 81 MG/1
81 TABLET ORAL DAILY
COMMUNITY
Start: 2017-03-28

## 2023-05-17 RX ORDER — LOSARTAN POTASSIUM AND HYDROCHLOROTHIAZIDE 12.5; 5 MG/1; MG/1
1 TABLET ORAL 2 TIMES DAILY
COMMUNITY
Start: 2022-10-14 | End: 2023-07-11 | Stop reason: SDUPTHER

## 2023-05-17 RX ORDER — ATORVASTATIN CALCIUM 20 MG/1
20 TABLET, FILM COATED ORAL DAILY
COMMUNITY
Start: 2022-08-29 | End: 2023-07-11 | Stop reason: SDUPTHER

## 2023-05-19 NOTE — TELEPHONE ENCOUNTER
Kareem Swanson MA 5/18/2023 10:47 AM EDT      ----- Message -----  From: Lizett Oneil  Sent: 5/17/2023 4:53 PM EDT  To: , *  Subject: Referral Physical therapy     Dear Dr. Jarrod Contreras. I have re-injured my left upper arm (rotator cuff). It seems to be the same or similar injury to the one I had about 10 months ago. You then referred me for PT at [facility]. I have called them but they need a new referral.  Would you, please refer me again, or do you need to see me before you could do so? I have an appointment on June 13. I don't think I should wait that long. The injure happened just shy of 3 weeks ago. I thought would heal but it has not. Symptoms are: hard and painful raising left arm straight out and then up, painful to turn a car steering wheel, hard and painful to lift the arm sideways, there is also a strain in the left side of the neck. Arm is also not as strong as usual.     Regards.   palak    Russell Mcrae  Born 1945

## 2023-05-24 ENCOUNTER — HOSPITAL ENCOUNTER (OUTPATIENT)
Facility: HOSPITAL | Age: 78
Setting detail: RECURRING SERIES
Discharge: HOME | End: 2023-05-27
Payer: MEDICARE

## 2023-05-24 PROCEDURE — 97110 THERAPEUTIC EXERCISES: CPT | Performed by: PHYSICAL THERAPIST

## 2023-05-24 PROCEDURE — 97161 PT EVAL LOW COMPLEX 20 MIN: CPT | Performed by: PHYSICAL THERAPIST

## 2023-05-24 NOTE — PROGRESS NOTES
Physical Therapy at [facility],   a part of  Chanda Thomas  [address] [facility] Frieda Jin  Phone: [phone]  Fax: [phone]       PHYSICAL THERAPY - MEDICARE EVALUATION/PLAN OF CARE NOTE (updated 3/23)      Date: 2023          Patient Name:  Alnodra Morton :  1945   Medical   Diagnosis:  Injury of left shoulder, initial encounter [S49.92XA] Treatment Diagnosis:  M25.512  LEFT SHOULDER PAIN    Referral Source:  Lakeshia Love MD Provider #:  [id number]                Insurance: Payor: MEDICARE / Plan: MEDICARE PART A AND B / Product Type: *No Product type* /      Patient  verified yes     Visit #   Current  / Total 1 24 per POC   Time   In / Out 1:30 2:20   Total Treatment Time 50min   Total Timed Codes 10   1:1 Treatment Time 10    [facility] Totals Reminder:  bill using total billable   min of TIMED therapeutic procedures and modalities.    8-22 min = 1 unit; 23-37 min = 2 units; 38-52 min = 3 units;  53-67 min = 4 units; 68-82 min = 5 units           SUBJECTIVE  Pain Level (0-10 scale): 4/10  [x]constant []intermittent [x]improving []worsening []no change since onset    Any medication changes, allergies to medications, adverse drug reactions, diagnosis change, or new procedure performed?: [x] No    [] Yes (see summary sheet for update)  Medications: Verified on Patient Summary List    Subjective functional status/changes:     Slipped in tub and used L arm to break fall  R rotator cuff repair 10 years ago   Upper arm pain, difficulty raising arm straight forward, and to side  Hard to drive because R turn is difficult   [place]  First week difficult to get dressed  Feels substantially better, but not good   6/10 worst, 2-3/10  Lifting anything above head, turning wheel feels worse, sitting and not using makes it feel better   Sleeps on L side, does not bother  Taking 400mg ibuprofen   Has not

## 2023-06-01 ENCOUNTER — HOSPITAL ENCOUNTER (OUTPATIENT)
Facility: HOSPITAL | Age: 78
Setting detail: RECURRING SERIES
Discharge: HOME | End: 2023-06-04
Payer: MEDICARE

## 2023-06-01 PROCEDURE — 97110 THERAPEUTIC EXERCISES: CPT

## 2023-06-01 PROCEDURE — 97016 VASOPNEUMATIC DEVICE THERAPY: CPT

## 2023-06-01 PROCEDURE — 97140 MANUAL THERAPY 1/> REGIONS: CPT

## 2023-06-07 ENCOUNTER — HOSPITAL ENCOUNTER (OUTPATIENT)
Facility: HOSPITAL | Age: 78
Setting detail: RECURRING SERIES
Discharge: HOME | End: 2023-06-10
Payer: MEDICARE

## 2023-06-07 PROCEDURE — 97110 THERAPEUTIC EXERCISES: CPT | Performed by: PHYSICAL THERAPIST

## 2023-06-07 PROCEDURE — 97140 MANUAL THERAPY 1/> REGIONS: CPT | Performed by: PHYSICAL THERAPIST

## 2023-06-09 ENCOUNTER — APPOINTMENT (OUTPATIENT)
Facility: HOSPITAL | Age: 78
End: 2023-06-09
Payer: MEDICARE

## 2023-06-15 ENCOUNTER — HOSPITAL ENCOUNTER (OUTPATIENT)
Facility: HOSPITAL | Age: 78
Setting detail: RECURRING SERIES
Discharge: HOME | End: 2023-06-18
Payer: MEDICARE

## 2023-06-15 PROCEDURE — 97110 THERAPEUTIC EXERCISES: CPT

## 2023-06-27 ENCOUNTER — HOSPITAL ENCOUNTER (OUTPATIENT)
Facility: HOSPITAL | Age: 78
Setting detail: RECURRING SERIES
Discharge: HOME | End: 2023-06-30
Payer: MEDICARE

## 2023-06-27 PROCEDURE — 97110 THERAPEUTIC EXERCISES: CPT | Performed by: PHYSICAL THERAPIST

## 2023-06-30 ENCOUNTER — APPOINTMENT (OUTPATIENT)
Facility: HOSPITAL | Age: 78
End: 2023-06-30
Payer: MEDICARE

## 2023-07-06 ENCOUNTER — HOSPITAL ENCOUNTER (OUTPATIENT)
Facility: HOSPITAL | Age: 78
Setting detail: RECURRING SERIES
Discharge: HOME | End: 2023-07-09
Payer: MEDICARE

## 2023-07-06 LAB
25(OH)D3 SERPL-MCNC: 55.8 NG/ML (ref 30–100)
ALBUMIN SERPL-MCNC: 4 G/DL (ref 3.5–5)
ALBUMIN/GLOB SERPL: 1.4 (ref 1.1–2.2)
ALP SERPL-CCNC: 29 U/L (ref 45–117)
ALT SERPL-CCNC: 30 U/L (ref 12–78)
ANION GAP SERPL CALC-SCNC: 4 MMOL/L (ref 5–15)
AST SERPL-CCNC: 22 U/L (ref 15–37)
BILIRUB SERPL-MCNC: 0.5 MG/DL (ref 0.2–1)
BUN SERPL-MCNC: 26 MG/DL (ref 6–20)
BUN/CREAT SERPL: 27 (ref 12–20)
CALCIUM SERPL-MCNC: 9.5 MG/DL (ref 8.5–10.1)
CHLORIDE SERPL-SCNC: 102 MMOL/L (ref 97–108)
CHOLEST SERPL-MCNC: 126 MG/DL
CO2 SERPL-SCNC: 30 MMOL/L (ref 21–32)
CREAT SERPL-MCNC: 0.98 MG/DL (ref 0.7–1.3)
CREAT UR-MCNC: 120 MG/DL
ERYTHROCYTE [DISTWIDTH] IN BLOOD BY AUTOMATED COUNT: 12.2 % (ref 11.5–14.5)
EST. AVERAGE GLUCOSE BLD GHB EST-MCNC: 111 MG/DL
GLOBULIN SER CALC-MCNC: 2.8 G/DL (ref 2–4)
GLUCOSE SERPL-MCNC: 117 MG/DL (ref 65–100)
HBA1C MFR BLD: 5.5 % (ref 4–5.6)
HCT VFR BLD AUTO: 41.2 % (ref 36.6–50.3)
HDLC SERPL-MCNC: 51 MG/DL
HDLC SERPL: 2.5 (ref 0–5)
HGB BLD-MCNC: 13.4 G/DL (ref 12.1–17)
LDLC SERPL CALC-MCNC: 63.2 MG/DL (ref 0–100)
MCH RBC QN AUTO: 32.1 PG (ref 26–34)
MCHC RBC AUTO-ENTMCNC: 32.5 G/DL (ref 30–36.5)
MCV RBC AUTO: 98.6 FL (ref 80–99)
MICROALBUMIN UR-MCNC: 0.7 MG/DL
MICROALBUMIN/CREAT UR-RTO: 6 MG/G (ref 0–30)
NRBC # BLD: 0 K/UL (ref 0–0.01)
NRBC BLD-RTO: 0 PER 100 WBC
PLATELET # BLD AUTO: 219 K/UL (ref 150–400)
PMV BLD AUTO: 9.7 FL (ref 8.9–12.9)
POTASSIUM SERPL-SCNC: 4.1 MMOL/L (ref 3.5–5.1)
PROT SERPL-MCNC: 6.8 G/DL (ref 6.4–8.2)
RBC # BLD AUTO: 4.18 M/UL (ref 4.1–5.7)
SODIUM SERPL-SCNC: 136 MMOL/L (ref 136–145)
TRIGL SERPL-MCNC: 59 MG/DL
URATE SERPL-MCNC: 6.1 MG/DL (ref 3.5–7.2)
VLDLC SERPL CALC-MCNC: 11.8 MG/DL
WBC # BLD AUTO: 5.4 K/UL (ref 4.1–11.1)

## 2023-07-06 PROCEDURE — 97110 THERAPEUTIC EXERCISES: CPT

## 2023-07-08 SDOH — ECONOMIC STABILITY: INCOME INSECURITY: HOW HARD IS IT FOR YOU TO PAY FOR THE VERY BASICS LIKE FOOD, HOUSING, MEDICAL CARE, AND HEATING?: NOT HARD AT ALL

## 2023-07-08 SDOH — ECONOMIC STABILITY: FOOD INSECURITY: WITHIN THE PAST 12 MONTHS, YOU WORRIED THAT YOUR FOOD WOULD RUN OUT BEFORE YOU GOT MONEY TO BUY MORE.: NEVER TRUE

## 2023-07-08 SDOH — ECONOMIC STABILITY: FOOD INSECURITY: WITHIN THE PAST 12 MONTHS, THE FOOD YOU BOUGHT JUST DIDN'T LAST AND YOU DIDN'T HAVE MONEY TO GET MORE.: NEVER TRUE

## 2023-07-08 SDOH — ECONOMIC STABILITY: TRANSPORTATION INSECURITY
IN THE PAST 12 MONTHS, HAS LACK OF TRANSPORTATION KEPT YOU FROM MEETINGS, WORK, OR FROM GETTING THINGS NEEDED FOR DAILY LIVING?: NO

## 2023-07-08 SDOH — ECONOMIC STABILITY: HOUSING INSECURITY
IN THE LAST 12 MONTHS, WAS THERE A TIME WHEN YOU DID NOT HAVE A STEADY PLACE TO SLEEP OR SLEPT IN A SHELTER (INCLUDING NOW)?: NO

## 2023-07-11 ENCOUNTER — OFFICE VISIT (OUTPATIENT)
Facility: CLINIC | Age: 78
End: 2023-07-11

## 2023-07-11 VITALS
HEIGHT: 67 IN | DIASTOLIC BLOOD PRESSURE: 69 MMHG | HEART RATE: 60 BPM | OXYGEN SATURATION: 98 % | WEIGHT: 155 LBS | BODY MASS INDEX: 24.33 KG/M2 | TEMPERATURE: 98.3 F | SYSTOLIC BLOOD PRESSURE: 141 MMHG

## 2023-07-11 DIAGNOSIS — K21.9 GASTROESOPHAGEAL REFLUX DISEASE WITHOUT ESOPHAGITIS: ICD-10-CM

## 2023-07-11 DIAGNOSIS — L57.0 MULTIPLE ACTINIC KERATOSES: ICD-10-CM

## 2023-07-11 DIAGNOSIS — G47.00 INSOMNIA, UNSPECIFIED TYPE: ICD-10-CM

## 2023-07-11 DIAGNOSIS — R93.1 HIGH CORONARY ARTERY CALCIUM SCORE: ICD-10-CM

## 2023-07-11 DIAGNOSIS — I10 BENIGN ESSENTIAL HTN: Primary | ICD-10-CM

## 2023-07-11 DIAGNOSIS — F51.8 DISRUPTED SLEEP-WAKE CYCLE: ICD-10-CM

## 2023-07-11 DIAGNOSIS — G47.20 DISRUPTED SLEEP-WAKE CYCLE: ICD-10-CM

## 2023-07-11 DIAGNOSIS — I25.10 CORONARY ARTERY DISEASE, NON-OCCLUSIVE: ICD-10-CM

## 2023-07-11 DIAGNOSIS — F33.1 MAJOR DEPRESSIVE DISORDER, RECURRENT, MODERATE (HCC): ICD-10-CM

## 2023-07-11 RX ORDER — ATORVASTATIN CALCIUM 40 MG/1
40 TABLET, FILM COATED ORAL DAILY
Qty: 90 TABLET | Refills: 3 | Status: SHIPPED | OUTPATIENT
Start: 2023-07-11

## 2023-07-11 RX ORDER — ATORVASTATIN CALCIUM 20 MG/1
20 TABLET, FILM COATED ORAL DAILY
Qty: 90 TABLET | Refills: 3 | Status: SHIPPED | OUTPATIENT
Start: 2023-07-11

## 2023-07-11 RX ORDER — ATENOLOL 50 MG/1
50 TABLET ORAL 2 TIMES DAILY
Qty: 180 TABLET | Refills: 3 | Status: SHIPPED | OUTPATIENT
Start: 2023-07-11

## 2023-07-11 RX ORDER — ATENOLOL 25 MG/1
TABLET ORAL
COMMUNITY
Start: 2023-05-25 | End: 2023-07-11

## 2023-07-11 RX ORDER — LOSARTAN POTASSIUM AND HYDROCHLOROTHIAZIDE 12.5; 5 MG/1; MG/1
1 TABLET ORAL 2 TIMES DAILY
Qty: 90 TABLET | Refills: 3 | Status: SHIPPED | OUTPATIENT
Start: 2023-07-11

## 2023-07-11 RX ORDER — OMEPRAZOLE 20 MG/1
20 CAPSULE, DELAYED RELEASE ORAL DAILY
Qty: 90 CAPSULE | Refills: 3 | Status: SHIPPED | OUTPATIENT
Start: 2023-07-11

## 2023-07-11 ASSESSMENT — PATIENT HEALTH QUESTIONNAIRE - PHQ9
SUM OF ALL RESPONSES TO PHQ9 QUESTIONS 1 & 2: 0
SUM OF ALL RESPONSES TO PHQ QUESTIONS 1-9: 0
2. FEELING DOWN, DEPRESSED OR HOPELESS: 0
1. LITTLE INTEREST OR PLEASURE IN DOING THINGS: 0
SUM OF ALL RESPONSES TO PHQ QUESTIONS 1-9: 0

## 2023-07-11 NOTE — PATIENT INSTRUCTIONS
Blood pressure is well controlled at home   Mood is per psych--consider counseling if possible, may be having some prolonged grief--having vivid dreams, some sleep disruption  Continue statin, labs lookin ggreat    Sleep is ok to fall asleep, trouble with how long he sleeps bhowever no daytime fatigue, able to do tasks, not napping  If worsening/disruptive, consider going to the sleep center for study    Aks per dermatology    No change to managements today    Fu with me in 6 mo, awv at that time

## 2023-07-13 ENCOUNTER — HOSPITAL ENCOUNTER (OUTPATIENT)
Facility: HOSPITAL | Age: 78
Setting detail: RECURRING SERIES
Discharge: HOME | End: 2023-07-16
Payer: MEDICARE

## 2023-07-13 PROCEDURE — 97110 THERAPEUTIC EXERCISES: CPT | Performed by: PHYSICAL THERAPIST

## 2023-07-19 ENCOUNTER — HOSPITAL ENCOUNTER (OUTPATIENT)
Facility: HOSPITAL | Age: 78
Setting detail: RECURRING SERIES
Discharge: HOME | End: 2023-07-22
Payer: MEDICARE

## 2023-07-19 PROCEDURE — 97110 THERAPEUTIC EXERCISES: CPT | Performed by: PHYSICAL THERAPIST

## 2023-07-19 PROCEDURE — 97112 NEUROMUSCULAR REEDUCATION: CPT | Performed by: PHYSICAL THERAPIST

## 2023-07-19 NOTE — PROGRESS NOTES
PHYSICAL THERAPY - MEDICARE DAILY TREATMENT NOTE (updated 3/23)      Date: 2023          Patient Name:  Salo Hartmann :  1945   Medical   Diagnosis:  Injury of left shoulder, initial encounter [S49.92XA] Treatment Diagnosis:  M25.512  LEFT SHOULDER PAIN    Referral Source:  Renea Renteria MD Insurance:   Payor: MEDICARE / Plan: MEDICARE PART A AND B / Product Type: *No Product type* /                     Patient  verified yes     Visit #   Current  / Total 9 GORDO   Time   In / Out 940 AM 1025 AM   Total Treatment Time 45   Total Timed Codes 45   1:1 Treatment Time 39      [facility] Totals Reminder:  bill using total billable   min of TIMED therapeutic procedures and modalities. 8-22 min = 1 unit; 23-37 min = 2 units; 38-52 min = 3 units; 53-67 min = 4 units; 68-82 min = 5 units            SUBJECTIVE    Pain Level (0-10 scale): 0/10    Any medication changes, allergies to medications, adverse drug reactions, diagnosis change, or new procedure performed?: [x] No    [] Yes (see summary sheet for update)  Medications: Verified on Patient Summary List    Subjective functional status/changes:     Patient reports he felt sore after last visit but by the next day he felt good again  Pt reports he did all of his exercises yesterday without an issue  \"Some of the exercises I'm doing for my balance are too difficult\"  OBJECTIVE      Therapeutic Procedures: Tx Min Billable or 1:1 Min (if diff from Tx Min) Procedure, Rationale, Specifics   15  [id number] Neuromuscular Re-Education (timed):  improve balance, coordination, kinesthetic sense, posture, core stability and proprioception to improve patient's ability to develop conscious control of individual muscles and awareness of position of extremities in order to progress to PLOF and address remaining functional goals.  (see flow sheet as applicable)     Details if applicable:  Review balance HEP     30  [id number] Therapeutic Exercise (timed):  increase ROM, strength,

## 2023-07-27 ENCOUNTER — HOSPITAL ENCOUNTER (OUTPATIENT)
Facility: HOSPITAL | Age: 78
Setting detail: RECURRING SERIES
Discharge: HOME | End: 2023-07-30
Payer: MEDICARE

## 2023-07-27 PROCEDURE — 97110 THERAPEUTIC EXERCISES: CPT | Performed by: PHYSICAL THERAPIST

## 2023-07-27 NOTE — PROGRESS NOTES
PHYSICAL THERAPY - MEDICARE DAILY TREATMENT NOTE (updated 3/23)      Date: 2023          Patient Name:  Darlene Burris :  1945   Medical   Diagnosis:  Injury of left shoulder, initial encounter [S49.92XA] Treatment Diagnosis:  M25.512  LEFT SHOULDER PAIN    Referral Source:  Ashlyn Harrison MD Insurance:   Payor: MEDICARE / Plan: MEDICARE PART A AND B / Product Type: *No Product type* /                     Patient  verified yes     Visit #   Current  / Total 9 GORDO   Time   In / Out 900 AM 938AM   Total Treatment Time 38   Total Timed Codes 38   1:1 Treatment Time 45      [facility] Totals Reminder:  bill using total billable   min of TIMED therapeutic procedures and modalities. 8-22 min = 1 unit; 23-37 min = 2 units; 38-52 min = 3 units; 53-67 min = 4 units; 68-82 min = 5 units            SUBJECTIVE    Pain Level (0-10 scale): 3-4/10    Any medication changes, allergies to medications, adverse drug reactions, diagnosis change, or new procedure performed?: [x] No    [] Yes (see summary sheet for update)  Medications: Verified on Patient Summary List    Subjective functional status/changes:     Patient reports he felt good after last visit but then he tried to move an appliance and had an increase in pain  OBJECTIVE      Therapeutic Procedures: Tx Min Billable or 1:1 Min (if diff from Tx Min) Procedure, Rationale, Specifics   0  [id number] Neuromuscular Re-Education (timed):  improve balance, coordination, kinesthetic sense, posture, core stability and proprioception to improve patient's ability to develop conscious control of individual muscles and awareness of position of extremities in order to progress to PLOF and address remaining functional goals.  (see flow sheet as applicable)     Details if applicable:      38  [id number] Therapeutic Exercise (timed):  increase ROM, strength, coordination, balance, and proprioception to improve patient's ability to progress to PLOF and address remaining functional

## 2023-08-05 ENCOUNTER — HEALTH MAINTENANCE LETTER (OUTPATIENT)
Age: 78
End: 2023-08-05

## 2023-08-23 DIAGNOSIS — I10 BENIGN ESSENTIAL HTN: ICD-10-CM

## 2023-08-23 RX ORDER — BUPROPION HYDROCHLORIDE 300 MG/1
TABLET ORAL
Qty: 90 TABLET | Refills: 1 | Status: SHIPPED | OUTPATIENT
Start: 2023-08-23

## 2023-08-23 RX ORDER — ATENOLOL 50 MG/1
TABLET ORAL
Qty: 135 TABLET | Refills: 1 | Status: SHIPPED | OUTPATIENT
Start: 2023-08-23

## 2023-08-23 RX ORDER — ATENOLOL 25 MG/1
TABLET ORAL
Qty: 90 TABLET | Refills: 1 | Status: SHIPPED | OUTPATIENT
Start: 2023-08-23

## 2023-08-31 ENCOUNTER — HOSPITAL ENCOUNTER (OUTPATIENT)
Facility: HOSPITAL | Age: 78
Setting detail: RECURRING SERIES
End: 2023-08-31
Payer: MEDICARE

## 2023-08-31 PROCEDURE — 97110 THERAPEUTIC EXERCISES: CPT | Performed by: PHYSICAL THERAPIST

## 2023-08-31 NOTE — PROGRESS NOTES
PHYSICAL THERAPY - MEDICARE DAILY TREATMENT NOTE (updated 3/23)      Date: 2023          Patient Name:  Ulises Smith :  1945   Medical   Diagnosis:  Injury of left shoulder, initial encounter [S49.92XA] Treatment Diagnosis:  M25.512  LEFT SHOULDER PAIN    Referral Source:  Becca Bustos MD Insurance:   Payor: MEDICARE / Plan: MEDICARE PART A AND B / Product Type: *No Product type* /                     Patient  verified yes     Visit #   Current  / Total 11 GORDO   Time   In / Out 100  PM   Total Treatment Time 40   Total Timed Codes 40   1:1 Treatment Time 40      [facility] Totals Reminder:  bill using total billable   min of TIMED therapeutic procedures and modalities. 8-22 min = 1 unit; 23-37 min = 2 units; 38-52 min = 3 units; 53-67 min = 4 units; 68-82 min = 5 units            SUBJECTIVE    Pain Level (0-10 scale): 2-3/10 at rest    Any medication changes, allergies to medications, adverse drug reactions, diagnosis change, or new procedure performed?: [x] No    [] Yes (see summary sheet for update)  Medications: Verified on Patient Summary List    Subjective functional status/changes:     Patient reports he has been having more L shoulder pain since traveling  He has pain holding his book, pushing his arm out to the side    OBJECTIVE      Therapeutic Procedures: Tx Min Billable or 1:1 Min (if diff from Tx Min) Procedure, Rationale, Specifics   0  [id number] Neuromuscular Re-Education (timed):  improve balance, coordination, kinesthetic sense, posture, core stability and proprioception to improve patient's ability to develop conscious control of individual muscles and awareness of position of extremities in order to progress to PLOF and address remaining functional goals.  (see flow sheet as applicable)     Details if applicable:      40  20141 Therapeutic Exercise (timed):  increase ROM, strength, coordination, balance, and proprioception to improve patient's ability to progress to PLOF

## 2023-08-31 NOTE — PROGRESS NOTES
Physical Therapy at [facility],   a part of [address] [place] [address] [address]  Phone: [phone]  Fax: [phone]  CONTINUED PLAN OF CARE/RECERTIFICATION FOR PHYSICAL THERAPY          Patient Name:              Britany Bowen :  1945   Treatment/Medical Diagnosis:  Injury of left shoulder, initial encounter [S49.92XA]   Onset Date:  3 weeks prior to start of care    Referral Source:  Bobby Gardiner MD Start of Care [facility]):  23   Prior Hospitalization:  See Medical History Provider #:  [id number]      Prior Level of Function (PLOF):  Pain free driving and ADLs   Comorbidities:   R rotator cuff and 1 biceps repaired 10 years ago, herniated disc, atherosclerosis   Medications:  Verified on Patient Summary List   Visits from [facility]:  11 Missed Visits:  0     Progress toward Goals: The patient was progressing well towards goals at last visit (one month ago) but unfortunately had a set back when he travelled to [facility] and re-injured his L shoulder carrying luggage. Short Term Goals: To be accomplished in 4 weeks   Patient will demonstrate competency with HEP - MET  Patient will use L arm to lift an empty glass from the table without pain - NOT MET  Patient will hold 5lb weight in L arm for 2 min without increase in pain to increase ability to hold household objects - NOT MET  Long Term Goals:  To be accomplished in 12 weeks NOT MET  Patient will demonstrate full AROM of shoulder flexion without limitation from pain to reach shelves in house   Patient will be able to carry groceries inside home without pain  Patient will drive for 2+ hours without an increase in shoulder pain      Key Functional Changes/Progress: No significant changes due to set back from travel  Problem List: pain affecting function, decrease ROM, decrease strength, decrease ADL/functional abilities, decrease activity tolerance, decrease flexibility/joint

## 2023-09-06 ENCOUNTER — HOSPITAL ENCOUNTER (OUTPATIENT)
Facility: HOSPITAL | Age: 78
Setting detail: RECURRING SERIES
Discharge: HOME | End: 2023-09-09
Payer: MEDICARE

## 2023-09-06 PROCEDURE — 97110 THERAPEUTIC EXERCISES: CPT | Performed by: PHYSICAL THERAPIST

## 2023-09-06 NOTE — PROGRESS NOTES
PHYSICAL THERAPY - MEDICARE DAILY TREATMENT NOTE (updated 3/23)      Date: 2023          Patient Name:  Rishabh Condon :  1945   Medical   Diagnosis:  Injury of left shoulder, initial encounter [S49.92XA] Treatment Diagnosis:  M25.512  LEFT SHOULDER PAIN    Referral Source:  Brant King MD Insurance:   Payor: MEDICARE / Plan: MEDICARE PART A AND B / Product Type: *No Product type* /                     Patient  verified yes     Visit #   Current  / Total 12 GORDO   Time   In / Out 855  AM   Total Treatment Time 40   Total Timed Codes 40   1:1 Treatment Time 40      [facility] Totals Reminder:  bill using total billable   min of TIMED therapeutic procedures and modalities. 8-22 min = 1 unit; 23-37 min = 2 units; 38-52 min = 3 units; 53-67 min = 4 units; 68-82 min = 5 units            SUBJECTIVE    Pain Level (0-10 scale): 2/10 at rest, 4 with certain movements    Any medication changes, allergies to medications, adverse drug reactions, diagnosis change, or new procedure performed?: [x] No    [] Yes (see summary sheet for update)  Medications: Verified on Patient Summary List    Subjective functional status/changes:     Patient reports his pain is improving  Pt reports pain inchreases with a turning of the wheel motion and sometimes at night  \"My reach is good and I can scratch my back\"    OBJECTIVE      Therapeutic Procedures: Tx Min Billable or 1:1 Min (if diff from Tx Min) Procedure, Rationale, Specifics   0  [id number] Neuromuscular Re-Education (timed):  improve balance, coordination, kinesthetic sense, posture, core stability and proprioception to improve patient's ability to develop conscious control of individual muscles and awareness of position of extremities in order to progress to PLOF and address remaining functional goals.  (see flow sheet as applicable)     Details if applicable:      40  10106 Therapeutic Exercise (timed):  increase ROM, strength, coordination, balance, and

## 2023-09-15 ENCOUNTER — APPOINTMENT (OUTPATIENT)
Facility: HOSPITAL | Age: 78
End: 2023-09-15
Payer: MEDICARE

## 2023-09-20 ENCOUNTER — HOSPITAL ENCOUNTER (OUTPATIENT)
Facility: HOSPITAL | Age: 78
Setting detail: RECURRING SERIES
Discharge: HOME | End: 2023-09-23
Payer: MEDICARE

## 2023-09-20 PROCEDURE — 97110 THERAPEUTIC EXERCISES: CPT | Performed by: PHYSICAL THERAPIST

## 2023-09-20 NOTE — PROGRESS NOTES
PHYSICAL THERAPY - MEDICARE DAILY TREATMENT NOTE (updated 3/23)      Date: 2023          Patient Name:  Ladonna Yao :  1945   Medical   Diagnosis:  Injury of left shoulder, initial encounter [S49.92XA] Treatment Diagnosis:  M25.512  LEFT SHOULDER PAIN    Referral Source:  Rachana Logan MD Insurance:   Payor: MEDICARE / Plan: MEDICARE PART A AND B / Product Type: *No Product type* /                     Patient  verified yes     Visit #   Current  / Total 13 GORDO   Time   In / Out 900 AM 945AM   Total Treatment Time 45   Total Timed Codes 45   1:1 Treatment Time 39      [facility] Totals Reminder:  bill using total billable   min of TIMED therapeutic procedures and modalities. 8-22 min = 1 unit; 23-37 min = 2 units; 38-52 min = 3 units; 53-67 min = 4 units; 68-82 min = 5 units            SUBJECTIVE    Pain Level (0-10 scale): 3/10    Any medication changes, allergies to medications, adverse drug reactions, diagnosis change, or new procedure performed?: [x] No    [] Yes (see summary sheet for update)  Medications: Verified on Patient Summary List    Subjective functional status/changes:     Patient reports his pain continues to improve but he now feels more discomfortable in the biceps instead of the triceps    OBJECTIVE      Therapeutic Procedures: Tx Min Billable or 1:1 Min (if diff from Tx Min) Procedure, Rationale, Specifics   0  [id number] Neuromuscular Re-Education (timed):  improve balance, coordination, kinesthetic sense, posture, core stability and proprioception to improve patient's ability to develop conscious control of individual muscles and awareness of position of extremities in order to progress to PLOF and address remaining functional goals.  (see flow sheet as applicable)     Details if applicable:      45  70521 Therapeutic Exercise (timed):  increase ROM, strength, coordination, balance, and proprioception to improve patient's ability to progress to PLOF and address remaining

## 2023-09-27 ENCOUNTER — APPOINTMENT (OUTPATIENT)
Facility: HOSPITAL | Age: 78
End: 2023-09-27
Payer: MEDICARE

## 2023-09-29 ENCOUNTER — PATIENT MESSAGE (OUTPATIENT)
Facility: CLINIC | Age: 78
End: 2023-09-29

## 2023-10-03 RX ORDER — SILDENAFIL 100 MG/1
100 TABLET, FILM COATED ORAL PRN
Qty: 30 TABLET | Refills: 3 | Status: SHIPPED | OUTPATIENT
Start: 2023-10-03

## 2023-10-03 NOTE — TELEPHONE ENCOUNTER
Alexander Rivera LPN  33:06 PM EDT      ----- Message -----  From: Rishabh Condon  Sent: 2023 12:06 PM EDT  To: *  Subject: Refill of Viagra generic     Dear Dr. Jaimes Stafford:  Would you be so kind and submit a prescription for sildenafil (Viagra). I had a prescription (strength 100 mg) with regular refills since , but I have not needed the medication since my wife passed soon 3 years ago. However, my situation has changed and I would appreciate a prescription. I use [address] on [address]. Thank you.    palak Condon  [address] [facility]

## 2023-10-04 ENCOUNTER — TELEPHONE (OUTPATIENT)
Facility: CLINIC | Age: 78
End: 2023-10-04

## 2023-10-04 ENCOUNTER — HOSPITAL ENCOUNTER (OUTPATIENT)
Facility: HOSPITAL | Age: 78
Setting detail: RECURRING SERIES
Discharge: HOME | End: 2023-10-07
Payer: MEDICARE

## 2023-10-04 PROCEDURE — 97110 THERAPEUTIC EXERCISES: CPT | Performed by: PHYSICAL THERAPIST

## 2023-10-04 NOTE — PROGRESS NOTES
Physical Therapy at [facility],   a part of [address] [place] [address], [facility] [address]  Phone: [phone]  Fax: [phone]  PHYSICAL THERAPY PROGRESS NOTE  Patient Name:  Brant Etienne :  1945   Treatment/Medical Diagnosis: Injury of left shoulder, initial encounter [S49.92XA]   Referral Source:  Page Son MD     Date of Initial Visit:  23 Attended Visits:  14 Missed Visits:  0     SUMMARY OF TREATMENT/ASSESSMENT:  The patient has completed 14 PT visits. He has been treated with therapeutic exercise, manual therapy, vaso pneumatic compression,  home exercise program and patient education. CURRENT STATUS  Pt has met 2/4 updated goals. Certification Period (23-23)  1) Patient will use L arm to lift an empty glass from the table without pain - MET  2) Patient will demonstrate full AROM of shoulder flexion without limitation from pain to reach shelves in house - NOT MET  3) Patient will be able to carry groceries inside home without pain - NOT MET  4) Patient will read in bed without an increase in pain- MET      RECOMMENDATIONS  Continue PT for one additional week and then follow up after  patient's vacation (3 weeks in October) to see if additional PT is indicated. Brianna Barajas, PT       10/4/2023       9:42 AM    If you have any questions/comments please contact us directly at [phone]. Thank you for allowing us to assist in the care of your patient.

## 2023-10-04 NOTE — TELEPHONE ENCOUNTER
Jeffy Wade from [facility] is stating that plan of care has been sent over but not returned. She is going to fax it over as they need it for an audit.     Jeffy Wade [phone]  Fax [phone] Alert-The patient is alert, awake and responds to voice. The patient is oriented to time, place, and person. The triage nurse is able to obtain subjective information.

## 2023-10-04 NOTE — PROGRESS NOTES
PHYSICAL THERAPY - MEDICARE DAILY TREATMENT NOTE (updated 3/23)      Date: 10/4/2023          Patient Name:  Jonatan Karimi :  1945   Medical   Diagnosis:  Injury of left shoulder, initial encounter [S49.92XA] Treatment Diagnosis:  M25.512  LEFT SHOULDER PAIN    Referral Source:  Yaw Colón MD Insurance:   Payor: MEDICARE / Plan: MEDICARE PART A AND B / Product Type: *No Product type* /                     Patient  verified yes     Visit #   Current  / Total 14 GORDO   Time   In / Out 900  AM   Total Treatment Time 45   Total Timed Codes 45   1:1 Treatment Time 39      [facility] Totals Reminder:  bill using total billable   min of TIMED therapeutic procedures and modalities. 8-22 min = 1 unit; 23-37 min = 2 units; 38-52 min = 3 units; 53-67 min = 4 units; 68-82 min = 5 units            SUBJECTIVE    Pain Level (0-10 scale): 0/10    Any medication changes, allergies to medications, adverse drug reactions, diagnosis change, or new procedure performed?: [x] No    [] Yes (see summary sheet for update)  Medications: Verified on Patient Summary List    Subjective functional status/changes:     Patient reports he is feeling better and can lift a coffee mug to a shelf  Pt can read in bed with modifications  Pt can carry some groceries in the house but I Have to lock my arm in by my side     OBJECTIVE      Therapeutic Procedures: Tx Min Billable or 1:1 Min (if diff from Tx Min) Procedure, Rationale, Specifics   0  [id number] Neuromuscular Re-Education (timed):  improve balance, coordination, kinesthetic sense, posture, core stability and proprioception to improve patient's ability to develop conscious control of individual muscles and awareness of position of extremities in order to progress to PLOF and address remaining functional goals.  (see flow sheet as applicable)     Details if applicable:      45  46291 Therapeutic Exercise (timed):  increase ROM, strength, coordination, balance, and proprioception

## 2023-10-11 ENCOUNTER — HOSPITAL ENCOUNTER (OUTPATIENT)
Facility: HOSPITAL | Age: 78
Setting detail: RECURRING SERIES
Discharge: HOME | End: 2023-10-14
Payer: MEDICARE

## 2023-10-11 PROCEDURE — 97110 THERAPEUTIC EXERCISES: CPT | Performed by: PHYSICAL THERAPIST

## 2023-10-11 NOTE — PROGRESS NOTES
PHYSICAL THERAPY - MEDICARE DAILY TREATMENT NOTE (updated 3/23)      Date: 10/11/2023          Patient Name:  Live Rodriguez :  1945   Medical   Diagnosis:  Injury of left shoulder, initial encounter [S49.92XA] Treatment Diagnosis:  M25.512  LEFT SHOULDER PAIN    Referral Source:  Sandip Lazo MD Insurance:   Payor: MEDICARE / Plan: MEDICARE PART A AND B / Product Type: *No Product type* /                     Patient  verified yes     Visit #   Current  / Total 15 GORDO   Time   In / Out 945 AM 1025 AM   Total Treatment Time 40   Total Timed Codes 40   1:1 Treatment Time 25      [facility] Totals Reminder:  bill using total billable   min of TIMED therapeutic procedures and modalities. 8-22 min = 1 unit; 23-37 min = 2 units; 38-52 min = 3 units; 53-67 min = 4 units; 68-82 min = 5 units            SUBJECTIVE    Pain Level (0-10 scale): 0/10    Any medication changes, allergies to medications, adverse drug reactions, diagnosis change, or new procedure performed?: [x] No    [] Yes (see summary sheet for update)  Medications: Verified on Patient Summary List    Subjective functional status/changes:     Patient reports he is feeling better and can lift a wine bottle to a table  \"I can flip a page and read\" \"I am a safer \"    OBJECTIVE      Therapeutic Procedures: Tx Min Billable or 1:1 Min (if diff from Tx Min) Procedure, Rationale, Specifics   0  [id number] Neuromuscular Re-Education (timed):  improve balance, coordination, kinesthetic sense, posture, core stability and proprioception to improve patient's ability to develop conscious control of individual muscles and awareness of position of extremities in order to progress to PLOF and address remaining functional goals.  (see flow sheet as applicable)     Details if applicable:      40 39 13326 Therapeutic Exercise (timed):  increase ROM, strength, coordination, balance, and proprioception to improve patient's ability to progress to PLOF and address

## 2023-11-10 ENCOUNTER — PATIENT MESSAGE (OUTPATIENT)
Facility: CLINIC | Age: 78
End: 2023-11-10

## 2023-11-10 DIAGNOSIS — K59.00 CONSTIPATION, UNSPECIFIED CONSTIPATION TYPE: Primary | ICD-10-CM

## 2023-11-15 ENCOUNTER — HOSPITAL ENCOUNTER (OUTPATIENT)
Facility: HOSPITAL | Age: 78
Setting detail: RECURRING SERIES
Discharge: HOME | End: 2023-11-18
Payer: MEDICARE

## 2023-11-15 PROCEDURE — 97110 THERAPEUTIC EXERCISES: CPT | Performed by: PHYSICAL THERAPIST

## 2023-11-15 NOTE — PROGRESS NOTES
PHYSICAL THERAPY - MEDICARE DAILY TREATMENT NOTE (updated 3/23)      Date: 11/15/2023          Patient Name:  Fabby Naqvi :  1945   Medical   Diagnosis:  Injury of left shoulder, initial encounter [S49.92XA] Treatment Diagnosis:  M25.512  LEFT SHOULDER PAIN    Referral Source:  Lanre Cisse MD Insurance:   Payor: MEDICARE / Plan: MEDICARE PART A AND B / Product Type: *No Product type* /                     Patient  verified yes     Visit #   Current  / Total 16 GORDO   Time   In / Out 900  AM   Total Treatment Time 40   Total Timed Codes 40   1:1 Treatment Time 40      [facility] Totals Reminder:  bill using total billable   min of TIMED therapeutic procedures and modalities. 8-22 min = 1 unit; 23-37 min = 2 units; 38-52 min = 3 units; 53-67 min = 4 units; 68-82 min = 5 units            SUBJECTIVE    Pain Level (0-10 scale): 0/10    Any medication changes, allergies to medications, adverse drug reactions, diagnosis change, or new procedure performed?: [x] No    [] Yes (see summary sheet for update)  Medications: Verified on Patient Summary List    Subjective functional status/changes:     Patient reports he can drive without pain \"I can use the arm more\"  Pt had a flare up of pain while on vacation that lasted 10 days    OBJECTIVE      Therapeutic Procedures: Tx Min Billable or 1:1 Min (if diff from Tx Min) Procedure, Rationale, Specifics   0  [id number] Neuromuscular Re-Education (timed):  improve balance, coordination, kinesthetic sense, posture, core stability and proprioception to improve patient's ability to develop conscious control of individual muscles and awareness of position of extremities in order to progress to PLOF and address remaining functional goals.  (see flow sheet as applicable)     Details if applicable:      40  42013 Therapeutic Exercise (timed):  increase ROM, strength, coordination, balance, and proprioception to improve patient's ability to progress to PLOF and address
Re-Education, 96350 Manual Therapy, 54601 Therapeutic Activity, and 46637 Self Care/Home Management  Patient Goal(s) has been updated and includes: \"make sure the pain does not return\"    Goals for this certification period include and are to be achieved in   4  treatments:    1) Patient will don and doff a jacket without modifications, without an increase in pain  2) Patient will demonstrate full AROM of shoulder abduction without limitation to improve ease with reaching  3) Patient will be able to carry groceries inside home without pain       Frequency / Duration:   Patient to be seen   1   times per month for   4   treatments:      Assessments/Recommendations for Continuation of Care: The patient would benefit from continued PT 1x/month for up to 4 additional visits to finalize HEP and ensure pain does not return. If you have any questions/comments please contact us directly at [phone]. Thank you for allowing us to assist in the care of your patient. Certification Period: 11/15/23-2/13/24      Biju George, PT       11/15/2023       9:43 AM      ___ I have read the above report and request that my patient continue as recommended.   ___ I have read the above report and request that my patient continue therapy with the following changes/special instructions: ________________________________________________   ___ I have read the above report and request that my patient be discharged from therapy. Physician's Signature:_________________________   DATE:_________   TIME:________                           Geraldine Urrutia MD    ** Signature, Date and Time must be completed for valid certification **  Please sign and fax to [phone].   Thank you

## 2024-01-08 DIAGNOSIS — I10 BENIGN ESSENTIAL HTN: ICD-10-CM

## 2024-01-08 RX ORDER — LOSARTAN POTASSIUM AND HYDROCHLOROTHIAZIDE 12.5; 5 MG/1; MG/1
1 TABLET ORAL 2 TIMES DAILY
Qty: 180 TABLET | Refills: 3 | Status: SHIPPED | OUTPATIENT
Start: 2024-01-08

## 2024-01-17 DIAGNOSIS — R93.1 ABNORMAL FINDINGS ON DIAGNOSTIC IMAGING OF HEART AND CORONARY CIRCULATION: ICD-10-CM

## 2024-01-17 DIAGNOSIS — I34.0 NONRHEUMATIC MITRAL (VALVE) INSUFFICIENCY: Primary | ICD-10-CM

## 2024-01-17 DIAGNOSIS — R93.1 ELEVATED CORONARY ARTERY CALCIUM SCORE: ICD-10-CM

## 2024-01-24 ENCOUNTER — APPOINTMENT (OUTPATIENT)
Facility: HOSPITAL | Age: 79
End: 2024-01-24
Payer: MEDICARE

## 2024-02-02 ENCOUNTER — ANCILLARY PROCEDURE (OUTPATIENT)
Age: 79
End: 2024-02-02
Payer: MEDICARE

## 2024-02-02 ENCOUNTER — OFFICE VISIT (OUTPATIENT)
Age: 79
End: 2024-02-02
Payer: MEDICARE

## 2024-02-02 VITALS
WEIGHT: 155 LBS | HEART RATE: 60 BPM | DIASTOLIC BLOOD PRESSURE: 76 MMHG | SYSTOLIC BLOOD PRESSURE: 150 MMHG | BODY MASS INDEX: 24.33 KG/M2 | HEIGHT: 67 IN

## 2024-02-02 VITALS
WEIGHT: 155 LBS | DIASTOLIC BLOOD PRESSURE: 76 MMHG | BODY MASS INDEX: 24.33 KG/M2 | HEIGHT: 67 IN | SYSTOLIC BLOOD PRESSURE: 136 MMHG

## 2024-02-02 VITALS
WEIGHT: 155 LBS | HEART RATE: 58 BPM | HEIGHT: 67 IN | SYSTOLIC BLOOD PRESSURE: 150 MMHG | DIASTOLIC BLOOD PRESSURE: 76 MMHG | BODY MASS INDEX: 24.33 KG/M2

## 2024-02-02 DIAGNOSIS — I25.10 CORONARY ARTERY DISEASE, NON-OCCLUSIVE: ICD-10-CM

## 2024-02-02 DIAGNOSIS — R93.1 HIGH CORONARY ARTERY CALCIUM SCORE: ICD-10-CM

## 2024-02-02 DIAGNOSIS — R93.1 ABNORMAL FINDINGS ON DIAGNOSTIC IMAGING OF HEART AND CORONARY CIRCULATION: ICD-10-CM

## 2024-02-02 DIAGNOSIS — I34.0 NONRHEUMATIC MITRAL (VALVE) INSUFFICIENCY: ICD-10-CM

## 2024-02-02 DIAGNOSIS — R93.1 ELEVATED CORONARY ARTERY CALCIUM SCORE: ICD-10-CM

## 2024-02-02 DIAGNOSIS — E78.2 MIXED DYSLIPIDEMIA: ICD-10-CM

## 2024-02-02 DIAGNOSIS — I70.90 ATHEROSCLEROSIS: ICD-10-CM

## 2024-02-02 DIAGNOSIS — I10 BENIGN ESSENTIAL HTN: Primary | ICD-10-CM

## 2024-02-02 PROCEDURE — 99214 OFFICE O/P EST MOD 30 MIN: CPT | Performed by: SPECIALIST

## 2024-02-02 PROCEDURE — 1036F TOBACCO NON-USER: CPT | Performed by: SPECIALIST

## 2024-02-02 PROCEDURE — 1123F ACP DISCUSS/DSCN MKR DOCD: CPT | Performed by: SPECIALIST

## 2024-02-02 PROCEDURE — G8484 FLU IMMUNIZE NO ADMIN: HCPCS | Performed by: SPECIALIST

## 2024-02-02 PROCEDURE — 3075F SYST BP GE 130 - 139MM HG: CPT | Performed by: SPECIALIST

## 2024-02-02 PROCEDURE — 93017 CV STRESS TEST TRACING ONLY: CPT | Performed by: SPECIALIST

## 2024-02-02 PROCEDURE — G8427 DOCREV CUR MEDS BY ELIG CLIN: HCPCS | Performed by: SPECIALIST

## 2024-02-02 PROCEDURE — 93306 TTE W/DOPPLER COMPLETE: CPT | Performed by: SPECIALIST

## 2024-02-02 PROCEDURE — 3078F DIAST BP <80 MM HG: CPT | Performed by: SPECIALIST

## 2024-02-02 PROCEDURE — G8420 CALC BMI NORM PARAMETERS: HCPCS | Performed by: SPECIALIST

## 2024-02-02 RX ORDER — ASPIRIN 81 MG/1
81 TABLET ORAL DAILY
Qty: 90 TABLET | Refills: 3 | Status: SHIPPED | OUTPATIENT
Start: 2024-02-02

## 2024-02-02 RX ORDER — ATORVASTATIN CALCIUM 40 MG/1
40 TABLET, FILM COATED ORAL DAILY
Qty: 90 TABLET | Refills: 3 | Status: SHIPPED | OUTPATIENT
Start: 2024-02-02

## 2024-02-02 RX ORDER — ATENOLOL 50 MG/1
50 TABLET ORAL 2 TIMES DAILY
Qty: 180 TABLET | Refills: 3 | Status: SHIPPED | OUTPATIENT
Start: 2024-02-02

## 2024-02-02 RX ORDER — ATORVASTATIN CALCIUM 20 MG/1
20 TABLET, FILM COATED ORAL DAILY
Qty: 90 TABLET | Refills: 3 | Status: SHIPPED | OUTPATIENT
Start: 2024-02-02

## 2024-02-02 RX ORDER — LOSARTAN POTASSIUM AND HYDROCHLOROTHIAZIDE 12.5; 5 MG/1; MG/1
1 TABLET ORAL 2 TIMES DAILY
Qty: 180 TABLET | Refills: 3 | Status: SHIPPED | OUTPATIENT
Start: 2024-02-02

## 2024-02-02 NOTE — PROGRESS NOTES
Aldo Berger MD. [facility]          Patient: Baldo Sosa  : 1945      Today's Date: 2024        HISTORY OF PRESENT ILLNESS:     History of Present Illness:    He is doing OK.  He exercises.  Stamina dropped after COVID 1+ years ago.    Persistent cough since COVID.       PAST MEDICAL HISTORY:     Past Medical History:   Diagnosis Date    Arm paresthesia, right     from rototor cuff injury    Atherosclerosis     Benign essential HTN     Coronary artery disease, non-occlusive     COVID-19     Had COVID in 2022    Depression, major     from divorce. on wellburtrin. failed prozac.  sees psych in [place]    ED (erectile dysfunction)     GERD (gastroesophageal reflux disease)     High coronary artery calcium score     last stress test ;   CAC score 1400 in  per patient    History of bronchitis     Hyperlipidemia     Insomnia     Personal history of multiple pulmonary nodules     no further f/u needed.  followed by serial CTs, last .      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  normal per pt in [place]    ROTATOR CUFF REPAIR Right              CURRENT MEDICATIONS:    .  Current Outpatient Medications   Medication Sig Dispense Refill    losartan-hydroCHLOROthiazide (HYZAAR) 50-12.5 MG per tablet TAKE 1 TABLET BY MOUTH TWICE A  tablet 3    sildenafil (VIAGRA) 100 MG tablet Take 1 tablet by mouth as needed for Erectile Dysfunction 30 tablet 3    atenolol (TENORMIN) 50 MG tablet TAKE ONE TABLET BY MOUTH EVERY MORNING TAKE 1/2 TABLET BY MOUTH EVERY EVENING (Patient taking differently: Take 1 tablet by mouth in the morning and at bedtime) 135

## 2024-02-02 NOTE — PROGRESS NOTES
No chief complaint on file.    Vitals:    02/02/24 1001   BP: (!) 150/76   Weight: 70.3 kg (155 lb)   Height: 1.702 m (5' 7\")       Chest pain NO     ER, urgent care, or hospitalized outside of [facility] since your last visit?  NO     Refills annual cards refills    Requesting new PCP referral.  [facility].

## 2024-02-04 LAB
ECHO AO ASC DIAM: 3.4 CM
ECHO AO ASCENDING AORTA INDEX: 1.88 CM/M2
ECHO AO ROOT DIAM: 3.4 CM
ECHO AO ROOT INDEX: 1.88 CM/M2
ECHO AV AREA PEAK VELOCITY: 3.3 CM2
ECHO AV AREA VTI: 3.7 CM2
ECHO AV AREA/BSA PEAK VELOCITY: 1.8 CM2/M2
ECHO AV AREA/BSA VTI: 2 CM2/M2
ECHO AV MEAN GRADIENT: 3 MMHG
ECHO AV MEAN VELOCITY: 0.9 M/S
ECHO AV PEAK GRADIENT: 7 MMHG
ECHO AV PEAK VELOCITY: 1.3 M/S
ECHO AV VELOCITY RATIO: 0.85
ECHO AV VTI: 23.8 CM
ECHO BSA: 1.82 M2
ECHO BSA: 1.82 M2
ECHO EST RA PRESSURE: 3 MMHG
ECHO LA DIAMETER INDEX: 2.38 CM/M2
ECHO LA DIAMETER: 4.3 CM
ECHO LA TO AORTIC ROOT RATIO: 1.26
ECHO LA VOL A-L A2C: 83 ML (ref 18–58)
ECHO LA VOL A-L A4C: 52 ML (ref 18–58)
ECHO LA VOL MOD A2C: 78 ML (ref 18–58)
ECHO LA VOL MOD A4C: 50 ML (ref 18–58)
ECHO LA VOLUME AREA LENGTH: 70 ML
ECHO LA VOLUME INDEX A-L A2C: 46 ML/M2 (ref 16–34)
ECHO LA VOLUME INDEX A-L A4C: 29 ML/M2 (ref 16–34)
ECHO LA VOLUME INDEX AREA LENGTH: 39 ML/M2 (ref 16–34)
ECHO LA VOLUME INDEX MOD A2C: 43 ML/M2 (ref 16–34)
ECHO LA VOLUME INDEX MOD A4C: 28 ML/M2 (ref 16–34)
ECHO LV E' LATERAL VELOCITY: 8 CM/S
ECHO LV E' SEPTAL VELOCITY: 7 CM/S
ECHO LV EDV A2C: 90 ML
ECHO LV EDV A4C: 94 ML
ECHO LV EDV BP: 93 ML (ref 67–155)
ECHO LV EDV INDEX A4C: 52 ML/M2
ECHO LV EDV INDEX BP: 51 ML/M2
ECHO LV EDV NDEX A2C: 50 ML/M2
ECHO LV EJECTION FRACTION A2C: 68 %
ECHO LV EJECTION FRACTION A4C: 73 %
ECHO LV EJECTION FRACTION BIPLANE: 71 % (ref 55–100)
ECHO LV ESV A2C: 29 ML
ECHO LV ESV A4C: 25 ML
ECHO LV ESV BP: 27 ML (ref 22–58)
ECHO LV ESV INDEX A2C: 16 ML/M2
ECHO LV ESV INDEX A4C: 14 ML/M2
ECHO LV ESV INDEX BP: 15 ML/M2
ECHO LV FRACTIONAL SHORTENING: 38 % (ref 28–44)
ECHO LV INTERNAL DIMENSION DIASTOLE INDEX: 2.32 CM/M2
ECHO LV INTERNAL DIMENSION DIASTOLIC: 4.2 CM (ref 4.2–5.9)
ECHO LV INTERNAL DIMENSION SYSTOLIC INDEX: 1.44 CM/M2
ECHO LV INTERNAL DIMENSION SYSTOLIC: 2.6 CM
ECHO LV IVSD: 1 CM (ref 0.6–1)
ECHO LV MASS 2D: 118.7 G (ref 88–224)
ECHO LV MASS INDEX 2D: 65.6 G/M2 (ref 49–115)
ECHO LV POSTERIOR WALL DIASTOLIC: 0.8 CM (ref 0.6–1)
ECHO LV RELATIVE WALL THICKNESS RATIO: 0.38
ECHO LVOT AREA: 3.8 CM2
ECHO LVOT AV VTI INDEX: 0.95
ECHO LVOT DIAM: 2.2 CM
ECHO LVOT MEAN GRADIENT: 2 MMHG
ECHO LVOT PEAK GRADIENT: 5 MMHG
ECHO LVOT PEAK VELOCITY: 1.1 M/S
ECHO LVOT STROKE VOLUME INDEX: 47.2 ML/M2
ECHO LVOT SV: 85.5 ML
ECHO LVOT VTI: 22.5 CM
ECHO MV A VELOCITY: 0.97 M/S
ECHO MV AREA PHT: 3.7 CM2
ECHO MV AREA VTI: 2.9 CM2
ECHO MV E DECELERATION TIME (DT): 206.1 MS
ECHO MV E VELOCITY: 1 M/S
ECHO MV E/A RATIO: 1.03
ECHO MV E/E' LATERAL: 12.5
ECHO MV E/E' RATIO (AVERAGED): 13.39
ECHO MV LVOT VTI INDEX: 1.33
ECHO MV MAX VELOCITY: 1 M/S
ECHO MV MEAN GRADIENT: 1 MMHG
ECHO MV MEAN VELOCITY: 0.5 M/S
ECHO MV PEAK GRADIENT: 4 MMHG
ECHO MV PRESSURE HALF TIME (PHT): 59.8 MS
ECHO MV VTI: 29.9 CM
ECHO RA AREA 4C: 20.9 CM2
ECHO RA END SYSTOLIC VOLUME APICAL 4 CHAMBER INDEX BSA: 36 ML/M2
ECHO RA VOLUME: 66 ML
ECHO RIGHT VENTRICULAR SYSTOLIC PRESSURE (RVSP): 31 MMHG
ECHO RV INTERNAL DIMENSION: 4.1 CM
ECHO RV TAPSE: 2.2 CM (ref 1.7–?)
ECHO TV REGURGITANT MAX VELOCITY: 2.64 M/S
ECHO TV REGURGITANT PEAK GRADIENT: 28 MMHG
STRESS ANGINA INDEX: 0
STRESS BASELINE DIAS BP: 76 MMHG
STRESS BASELINE HR: 60 BPM
STRESS BASELINE SYS BP: 150 MMHG
STRESS ESTIMATED WORKLOAD: 10.4 METS
STRESS EXERCISE DUR MIN: 9 MIN
STRESS EXERCISE DUR SEC: 8 SEC
STRESS O2 SAT PEAK: 99 %
STRESS O2 SAT REST: 100 %
STRESS PEAK DIAS BP: 86 MMHG
STRESS PEAK SYS BP: 164 MMHG
STRESS PERCENT HR ACHIEVED: 78 %
STRESS POST PEAK HR: 109 BPM
STRESS RATE PRESSURE PRODUCT: NORMAL BPM*MMHG
STRESS SR DUKE TREADMILL SCORE: 9
STRESS ST DEPRESSION: 0 MM
STRESS TARGET HR: 140 BPM

## 2024-02-04 PROCEDURE — 93306 TTE W/DOPPLER COMPLETE: CPT | Performed by: SPECIALIST

## 2024-02-04 PROCEDURE — 93016 CV STRESS TEST SUPVJ ONLY: CPT | Performed by: SPECIALIST

## 2024-02-04 PROCEDURE — 93018 CV STRESS TEST I&R ONLY: CPT | Performed by: SPECIALIST

## 2024-02-05 ENCOUNTER — TELEPHONE (OUTPATIENT)
Facility: CLINIC | Age: 79
End: 2024-02-05

## 2024-02-05 NOTE — TELEPHONE ENCOUNTER
New Pt appt made for May 2024 with Dr. Dickerson. (Three months after his medicare wellness in Feb.)   JR    ----- Message from Graham Dickerson MD sent at 2/2/2024  4:46 PM EST -----  Regarding: FW: Patient  Okay with me! Will just need to get an appointment to meet him.     please assist. Thanks  ----- Message -----  From: Aldo Berger MD  Sent: 2/2/2024  10:23 AM EST  To: Graham Dickerson MD  Subject: Patient                                          HI Graham - hope you are well.  Would you be able to  Mr. Sosa.  Nice david.  Was seeing Daylin.  He'd love to stay in your practice.     Thanks!  Aldo

## 2024-02-17 SDOH — HEALTH STABILITY: PHYSICAL HEALTH: ON AVERAGE, HOW MANY MINUTES DO YOU ENGAGE IN EXERCISE AT THIS LEVEL?: 50 MIN

## 2024-02-17 SDOH — HEALTH STABILITY: PHYSICAL HEALTH: ON AVERAGE, HOW MANY DAYS PER WEEK DO YOU ENGAGE IN MODERATE TO STRENUOUS EXERCISE (LIKE A BRISK WALK)?: 6 DAYS

## 2024-02-17 ASSESSMENT — PATIENT HEALTH QUESTIONNAIRE - PHQ9
1. LITTLE INTEREST OR PLEASURE IN DOING THINGS: 1
SUM OF ALL RESPONSES TO PHQ QUESTIONS 1-9: 2
SUM OF ALL RESPONSES TO PHQ9 QUESTIONS 1 & 2: 2
SUM OF ALL RESPONSES TO PHQ QUESTIONS 1-9: 2
2. FEELING DOWN, DEPRESSED OR HOPELESS: 1
SUM OF ALL RESPONSES TO PHQ QUESTIONS 1-9: 2
SUM OF ALL RESPONSES TO PHQ QUESTIONS 1-9: 2

## 2024-02-17 ASSESSMENT — LIFESTYLE VARIABLES
HOW MANY STANDARD DRINKS CONTAINING ALCOHOL DO YOU HAVE ON A TYPICAL DAY: 1 OR 2
HOW MANY STANDARD DRINKS CONTAINING ALCOHOL DO YOU HAVE ON A TYPICAL DAY: 1
HOW OFTEN DO YOU HAVE A DRINK CONTAINING ALCOHOL: 3
HOW OFTEN DO YOU HAVE SIX OR MORE DRINKS ON ONE OCCASION: 1
HOW OFTEN DO YOU HAVE A DRINK CONTAINING ALCOHOL: 2-4 TIMES A MONTH

## 2024-02-20 ENCOUNTER — OFFICE VISIT (OUTPATIENT)
Facility: CLINIC | Age: 79
End: 2024-02-20
Payer: MEDICARE

## 2024-02-20 VITALS
WEIGHT: 157 LBS | HEIGHT: 67 IN | TEMPERATURE: 98.1 F | OXYGEN SATURATION: 99 % | SYSTOLIC BLOOD PRESSURE: 165 MMHG | HEART RATE: 60 BPM | RESPIRATION RATE: 18 BRPM | BODY MASS INDEX: 24.64 KG/M2 | DIASTOLIC BLOOD PRESSURE: 70 MMHG

## 2024-02-20 DIAGNOSIS — I10 BENIGN ESSENTIAL HTN: ICD-10-CM

## 2024-02-20 DIAGNOSIS — R73.01 IMPAIRED FASTING GLUCOSE: ICD-10-CM

## 2024-02-20 DIAGNOSIS — Z12.11 SCREENING FOR COLON CANCER: ICD-10-CM

## 2024-02-20 DIAGNOSIS — F33.1 MAJOR DEPRESSIVE DISORDER, RECURRENT, MODERATE (HCC): ICD-10-CM

## 2024-02-20 DIAGNOSIS — Z00.00 MEDICARE ANNUAL WELLNESS VISIT, SUBSEQUENT: Primary | ICD-10-CM

## 2024-02-20 DIAGNOSIS — E55.9 VITAMIN D DEFICIENCY, UNSPECIFIED: ICD-10-CM

## 2024-02-20 DIAGNOSIS — I25.10 CORONARY ARTERY DISEASE, NON-OCCLUSIVE: ICD-10-CM

## 2024-02-20 DIAGNOSIS — E55.9 VITAMIN D INSUFFICIENCY: ICD-10-CM

## 2024-02-20 DIAGNOSIS — R93.1 HIGH CORONARY ARTERY CALCIUM SCORE: ICD-10-CM

## 2024-02-20 PROCEDURE — G0439 PPPS, SUBSEQ VISIT: HCPCS | Performed by: FAMILY MEDICINE

## 2024-02-20 PROCEDURE — G8484 FLU IMMUNIZE NO ADMIN: HCPCS | Performed by: FAMILY MEDICINE

## 2024-02-20 PROCEDURE — 1123F ACP DISCUSS/DSCN MKR DOCD: CPT | Performed by: FAMILY MEDICINE

## 2024-02-20 PROCEDURE — 3077F SYST BP >= 140 MM HG: CPT | Performed by: FAMILY MEDICINE

## 2024-02-20 PROCEDURE — G8427 DOCREV CUR MEDS BY ELIG CLIN: HCPCS | Performed by: FAMILY MEDICINE

## 2024-02-20 PROCEDURE — 99214 OFFICE O/P EST MOD 30 MIN: CPT | Performed by: FAMILY MEDICINE

## 2024-02-20 PROCEDURE — 1036F TOBACCO NON-USER: CPT | Performed by: FAMILY MEDICINE

## 2024-02-20 PROCEDURE — 3078F DIAST BP <80 MM HG: CPT | Performed by: FAMILY MEDICINE

## 2024-02-20 PROCEDURE — G8420 CALC BMI NORM PARAMETERS: HCPCS | Performed by: FAMILY MEDICINE

## 2024-02-20 NOTE — PATIENT INSTRUCTIONS
referrals for you.  A list of these orders (if applicable) as well as your Preventive Care list are included within your After Visit Summary for your review.    Other Preventive Recommendations:    A preventive eye exam performed by an eye specialist is recommended every 1-2 years to screen for glaucoma; cataracts, macular degeneration, and other eye disorders.  A preventive dental visit is recommended every 6 months.  Try to get at least 150 minutes of exercise per week or 10,000 steps per day on a pedometer .  Order or download the FREE \"Exercise & Physical Activity: Your Everyday Guide\" from [employer] [place] [employer]. Call 1-235.409.4067 or search [employer] [place] [employer] online.  You need 4112-3365 mg of calcium and 2558-4941 IU of vitamin D per day. It is possible to meet your calcium requirement with diet alone, but a vitamin D supplement is usually necessary to meet this goal.  When exposed to the sun, use a sunscreen that protects against both UVA and UVB radiation with an SPF of 30 or greater. Reapply every 2 to 3 hours or after sweating, drying off with a towel, or swimming.  Always wear a seat belt when traveling in a car. Always wear a helmet when riding a bicycle or motorcycle.

## 2024-02-20 NOTE — PROGRESS NOTES
Medicare Annual Wellness Visit    Baldo Sosa is here for Medicare AWV    Assessment & Plan   Medicare annual wellness visit, subsequent  Screening for colon cancer  -     Cologuard (Fecal DNA Colorectal Cancer Screening)  Major depressive disorder, recurrent, moderate (HCC)  Benign essential HTN  -     Lipid Panel; Future  -     Microalbumin / Creatinine Urine Ratio; Future  -     Hemoglobin A1C; Future  -     CBC; Future  -     Comprehensive Metabolic Panel; Future  -     Uric Acid; Future  High coronary artery calcium score  -     Lipid Panel; Future  -     Microalbumin / Creatinine Urine Ratio; Future  -     Hemoglobin A1C; Future  -     CBC; Future  -     Comprehensive Metabolic Panel; Future  -     Uric Acid; Future  Vitamin D deficiency, unspecified  -     Vitamin D 25 Hydroxy; Future  Impaired fasting glucose  -     Lipid Panel; Future  -     Microalbumin / Creatinine Urine Ratio; Future  -     Hemoglobin A1C; Future  -     CBC; Future  -     Comprehensive Metabolic Panel; Future  -     Uric Acid; Future  Coronary artery disease, non-occlusive  Vitamin D insufficiency  -     Vitamin D 25 Hydroxy; Future  Recommendations for Preventive Services Due: see orders and patient instructions/AVS.  Recommended screening schedule for the next 5-10 years is provided to the patient in written form: see Patient Instructions/AVS.     Return for Medicare Annual Wellness Visit in 1 year.     Subjective       Patient's complete Health Risk Assessment and screening values have been reviewed and are found in Flowsheets. The following problems were reviewed today and where indicated follow up appointments were made and/or referrals ordered.    Positive Risk Factor Screenings with Interventions:    Fall Risk:  Do you feel unsteady or are you worried about falling? : (!) yes  2 or more falls in past year?: no  Fall with injury in past year?: no     Interventions:    Reviewed medications, home hazards, visual acuity, and

## 2024-02-20 NOTE — PROGRESS NOTES
Chief Complaint   Patient presents with    Medicare AWV     1. Have you been to the ER, urgent care clinic since your last visit?  Hospitalized since your last visit?No    2. Have you seen or consulted any other health care providers outside of the [facility] since your last visit?  Include any pap smears or colon screening. No     130

## 2024-02-22 DIAGNOSIS — I10 BENIGN ESSENTIAL HTN: ICD-10-CM

## 2024-02-22 RX ORDER — BUPROPION HYDROCHLORIDE 300 MG/1
TABLET ORAL
Qty: 90 TABLET | Refills: 1 | Status: SHIPPED | OUTPATIENT
Start: 2024-02-22

## 2024-02-22 RX ORDER — ATENOLOL 50 MG/1
TABLET ORAL
Qty: 135 TABLET | OUTPATIENT
Start: 2024-02-22

## 2024-02-22 NOTE — TELEPHONE ENCOUNTER
Atenolol was sent on 02/20/2024 with 3 more refills. This is a request generated by pharmacy that can be disregarded.

## 2024-02-27 ENCOUNTER — HOSPITAL ENCOUNTER (OUTPATIENT)
Facility: HOSPITAL | Age: 79
Setting detail: RECURRING SERIES
Discharge: HOME | End: 2024-03-01
Payer: MEDICARE

## 2024-02-27 PROCEDURE — 97535 SELF CARE MNGMENT TRAINING: CPT | Performed by: PHYSICAL THERAPIST

## 2024-02-27 NOTE — PROGRESS NOTES
PHYSICAL THERAPY - MEDICARE DAILY TREATMENT NOTE (updated 3/23)      Date: 2024          Patient Name:  Baldo Sosa :  1945   Medical   Diagnosis:  Injury of left shoulder, initial encounter [S49.92XA] Treatment Diagnosis:  M25.512  LEFT SHOULDER PAIN    Referral Source:  Daylin Aleman MD Insurance:   Payor: MEDICARE / Plan: MEDICARE PART A AND B / Product Type: *No Product type* /                     Patient  verified yes     Visit #   Current  / Total 18 GORDO   Time   In / Out 1010 AM 1045 AM   Total Treatment Time 35   Total Timed Codes 35   1:1 Treatment Time 35      [facility] Totals Reminder:  bill using total billable   min of TIMED therapeutic procedures and modalities.   8-22 min = 1 unit; 23-37 min = 2 units; 38-52 min = 3 units; 53-67 min = 4 units; 68-82 min = 5 units            SUBJECTIVE    Pain Level (0-10 scale): 0/10    Any medication changes, allergies to medications, adverse drug reactions, diagnosis change, or new procedure performed?: [x] No    [] Yes (see summary sheet for update)  Medications: Verified on Patient Summary List    Subjective functional status/changes:     Patient reports he has a cold  He has been feeling well \"Its not 100%\" \"I'm careful and I don't overdo it\"  \"I can  a milk carton, no problems putting on my jacket or drying my back, I can turn to the right in my car\"  \"I feel like the muscles are tired sometimes but no pain\"  \"I have full mobility\"    OBJECTIVE      Therapeutic Procedures:  Tx Min Billable or 1:1 Min (if diff from Tx Min) Procedure, Rationale, Specifics   35  87178 Self Care/Home Management (timed):  improve patient knowledge and understanding of pain reducing techniques, positioning, posture/ergonomics, home safety, activity modification, diagnosis/prognosis, and physical therapy expectations, procedures and progression  to improve patient's ability to progress to PLOF and address remaining functional goals.  (see flow sheet as

## 2024-02-27 NOTE — THERAPY DISCHARGE
Physical Therapy at [place],   a part of [facility]  [address], [address]  [place] [address]  Phone: [phone]  Fax: [phone]  DISCHARGE SUMMARY  Patient Name: Baldo Sosa : 1945   Treatment/Medical Diagnosis: Injury of left shoulder, initial encounter [S49.92XA]   Referral Source: Daylin Aleman MD     Date of Initial Visit: 23 Attended Visits: 18 Missed Visits: 0     SUMMARY OF TREATMENT  The patient has completed 18 visits.  Treatment has included a combination of therapeutic exercise, neuromuscular re-education, instruction of home exercise program and patient education regarding ADLs/ self care. The patient is independent with finalized home exercise program at this time.        CURRENT STATUS  ALL GOALS MET.  The patient no longer complains of shoulder pain and ROM has returned to PLOF.  At this time he can sleep through the night, don and doff a jacket, hold a gallon of milk and reach a shelf overhead without an increase in pain.    Goals for this certification period (11/15/24-24) include and are to be achieved in   4  treatments:     1) Patient will don and doff a jacket without modifications, without an increase in pain MET  2) Patient will demonstrate full AROM of shoulder abduction without limitation to improve ease with reaching MET  3) Patient will be able to carry groceries inside home without pain MET         RECOMMENDATIONS  Discontinue therapy. Progressing towards or have reached established goals.        Anu Marsh, PT       2024       10:35 AM    If you have any questions/comments please contact us directly at [phone].   Thank you for allowing us to assist in the care of your patient.

## 2024-02-28 DIAGNOSIS — R93.1 HIGH CORONARY ARTERY CALCIUM SCORE: ICD-10-CM

## 2024-02-28 DIAGNOSIS — I10 BENIGN ESSENTIAL HTN: ICD-10-CM

## 2024-02-28 DIAGNOSIS — E55.9 VITAMIN D DEFICIENCY, UNSPECIFIED: ICD-10-CM

## 2024-02-28 DIAGNOSIS — R73.01 IMPAIRED FASTING GLUCOSE: ICD-10-CM

## 2024-02-28 DIAGNOSIS — E55.9 VITAMIN D INSUFFICIENCY: ICD-10-CM

## 2024-02-28 LAB
25(OH)D3 SERPL-MCNC: 40 NG/ML (ref 30–100)
ALBUMIN SERPL-MCNC: 3.8 G/DL (ref 3.5–5)
ALBUMIN/GLOB SERPL: 1.3 (ref 1.1–2.2)
ALP SERPL-CCNC: 43 U/L (ref 45–117)
ALT SERPL-CCNC: 29 U/L (ref 12–78)
ANION GAP SERPL CALC-SCNC: 3 MMOL/L (ref 5–15)
AST SERPL-CCNC: 19 U/L (ref 15–37)
BILIRUB SERPL-MCNC: 0.6 MG/DL (ref 0.2–1)
BUN SERPL-MCNC: 17 MG/DL (ref 6–20)
BUN/CREAT SERPL: 15 (ref 12–20)
CALCIUM SERPL-MCNC: 8.9 MG/DL (ref 8.5–10.1)
CHLORIDE SERPL-SCNC: 106 MMOL/L (ref 97–108)
CHOLEST SERPL-MCNC: 122 MG/DL
CO2 SERPL-SCNC: 29 MMOL/L (ref 21–32)
CREAT SERPL-MCNC: 1.13 MG/DL (ref 0.7–1.3)
CREAT UR-MCNC: 138 MG/DL
ERYTHROCYTE [DISTWIDTH] IN BLOOD BY AUTOMATED COUNT: 12.5 % (ref 11.5–14.5)
EST. AVERAGE GLUCOSE BLD GHB EST-MCNC: 120 MG/DL
GLOBULIN SER CALC-MCNC: 2.9 G/DL (ref 2–4)
GLUCOSE SERPL-MCNC: 108 MG/DL (ref 65–100)
HBA1C MFR BLD: 5.8 % (ref 4–5.6)
HCT VFR BLD AUTO: 41.1 % (ref 36.6–50.3)
HDLC SERPL-MCNC: 52 MG/DL
HDLC SERPL: 2.3 (ref 0–5)
HGB BLD-MCNC: 14.1 G/DL (ref 12.1–17)
LDLC SERPL CALC-MCNC: 58.4 MG/DL (ref 0–100)
MCH RBC QN AUTO: 32.7 PG (ref 26–34)
MCHC RBC AUTO-ENTMCNC: 34.3 G/DL (ref 30–36.5)
MCV RBC AUTO: 95.4 FL (ref 80–99)
MICROALBUMIN UR-MCNC: 0.78 MG/DL
MICROALBUMIN/CREAT UR-RTO: 6 MG/G (ref 0–30)
NRBC # BLD: 0 K/UL (ref 0–0.01)
NRBC BLD-RTO: 0 PER 100 WBC
PLATELET # BLD AUTO: 220 K/UL (ref 150–400)
PMV BLD AUTO: 9.3 FL (ref 8.9–12.9)
POTASSIUM SERPL-SCNC: 4.2 MMOL/L (ref 3.5–5.1)
PROT SERPL-MCNC: 6.7 G/DL (ref 6.4–8.2)
RBC # BLD AUTO: 4.31 M/UL (ref 4.1–5.7)
SODIUM SERPL-SCNC: 138 MMOL/L (ref 136–145)
TRIGL SERPL-MCNC: 58 MG/DL
URATE SERPL-MCNC: 6 MG/DL (ref 3.5–7.2)
VLDLC SERPL CALC-MCNC: 11.6 MG/DL
WBC # BLD AUTO: 7.2 K/UL (ref 4.1–11.1)

## 2024-03-06 LAB — NONINV COLON CA DNA+OCC BLD SCRN STL QL: NEGATIVE

## 2024-03-25 ENCOUNTER — PATIENT MESSAGE (OUTPATIENT)
Facility: CLINIC | Age: 79
End: 2024-03-25

## 2024-03-25 NOTE — TELEPHONE ENCOUNTER
From: SOTO HERNANDEZ  To: Baldo Sosa  Sent: 3/25/2024 1:43 PM EDT  Subject: Medication Refills    Dear Valued Patient,       As you may already know, Dr. Daylin Aleman's last day with [facility] is Thursday March 28, 2024.    Due to this we ask if any medications are in need of refills, please have your pharmacy submit to our office by 12 Noon Wednesday March 27, 2024.     If appropriate, Dr Aleman will refill with additional refills.     Kindly,   [facility]    Patients may also complete self scheduling on line.

## 2024-05-01 SDOH — HEALTH STABILITY: PHYSICAL HEALTH: ON AVERAGE, HOW MANY DAYS PER WEEK DO YOU ENGAGE IN MODERATE TO STRENUOUS EXERCISE (LIKE A BRISK WALK)?: 4 DAYS

## 2024-05-01 SDOH — HEALTH STABILITY: PHYSICAL HEALTH: ON AVERAGE, HOW MANY MINUTES DO YOU ENGAGE IN EXERCISE AT THIS LEVEL?: 50 MIN

## 2024-05-02 ENCOUNTER — OFFICE VISIT (OUTPATIENT)
Facility: CLINIC | Age: 79
End: 2024-05-02

## 2024-05-02 VITALS
DIASTOLIC BLOOD PRESSURE: 76 MMHG | TEMPERATURE: 97.6 F | BODY MASS INDEX: 24.56 KG/M2 | WEIGHT: 156.5 LBS | HEIGHT: 67 IN | HEART RATE: 62 BPM | RESPIRATION RATE: 20 BRPM | SYSTOLIC BLOOD PRESSURE: 140 MMHG | OXYGEN SATURATION: 98 %

## 2024-05-02 DIAGNOSIS — M25.512 CHRONIC LEFT SHOULDER PAIN: ICD-10-CM

## 2024-05-02 DIAGNOSIS — G89.29 CHRONIC LEFT SHOULDER PAIN: ICD-10-CM

## 2024-05-02 DIAGNOSIS — R05.3 CHRONIC COUGH: ICD-10-CM

## 2024-05-02 DIAGNOSIS — F32.4 MAJOR DEPRESSIVE DISORDER WITH SINGLE EPISODE, IN PARTIAL REMISSION (HCC): ICD-10-CM

## 2024-05-02 DIAGNOSIS — F51.04 PSYCHOPHYSIOLOGICAL INSOMNIA: Primary | ICD-10-CM

## 2024-05-02 NOTE — PROGRESS NOTES
Chief Complaint   Patient presents with    New Patient     New pt from Dr. Aleman has a list of things to discuss .  Trouble with L arm for a year has been evaluated and had PT consult.  Feels his cardiac fitness has gone down significantly due to having covid. As well as a chronic cough following covid.  Has not had a chest xray in a long time.      Discuss Labs    Sleep Problem       
about 6 months (around 11/2/2024) for 6 month follow up.      The following portions of the patient's history were reviewed and updated as appropriate: allergies, current medications, family history, medical history, social history, surgical history and problem list.    OBJECTIVE FINDINGS:    BP (!) 140/76   Pulse 62   Temp 97.6 °F (36.4 °C)   Resp 20   Ht 1.702 m (5' 7\")   Wt 71 kg (156 lb 8 oz)   SpO2 98%   BMI 24.51 kg/m²     Physical exam:  Constitutional: well-appearing, no acute distress  Eyes: EOM intact. PERRL bilateral. Not icteric.  Cardiac: normal rate, regular rhythm.  Pulm: normal rate, normal effort.  Skin: normal color and turgor.  Lower extrem: no edema.        Note: relevant labs reviewed and noted in the Overview section above.      On this date 5/2/2024 I have spent 40 minutes reviewing previous notes, test results and face to face with the patient discussing the diagnosis and importance of compliance with the treatment plan as well as documenting on the day of the visit.    An electronic signature was used to authenticate this note.     Portions of this note may have been populated using smart dictation software and may have \"sounds-like\" errors present.     Pt was counseled on risks, benefits and alternatives of treatment options. All questions were asked and answered and the patient was agreeable with the treatment plan as outlined.    Graham Dickerson MD  [facility]  [phone]

## 2024-05-07 ENCOUNTER — PATIENT MESSAGE (OUTPATIENT)
Facility: CLINIC | Age: 79
End: 2024-05-07

## 2024-05-07 DIAGNOSIS — M25.512 CHRONIC LEFT SHOULDER PAIN: Primary | ICD-10-CM

## 2024-05-07 DIAGNOSIS — G89.29 CHRONIC LEFT SHOULDER PAIN: Primary | ICD-10-CM

## 2024-05-07 NOTE — TELEPHONE ENCOUNTER
From: Baldo Sosa  To: Dr. Graham Dickerson  Sent: 5/7/2024 2:57 PM EDT  Subject: Left arm    Dear Dr. Dickerson:  Just a day or two after my visit with you on May 2nd my left arm got really bad. I can neither wash my hair with both hands nor bringing a coffee cup to my mouth with my left hand, etc. Today, I put my arm in a sling to let it rest for a couple of days. It has in the past helped if followed by light home PT. I started taking Ibuprofen (400 x 4) today. Previously, I have taken 600 x 4, but I wanted to ask you if I should increase the dose to 600?   If you think I should go back for PT at [facility] [place], I would need a new referral. I previously saw Anu Marsh and we worked well together. I had numerous sessions between Elvira 15, 2023 until my last session on February 27, 2024.   I am not sure what has created this situation. I took my 2-mile walk on the hot May 2nd. Afterwards, I had serious difficulties removing my sweat drenched t-shirt. It stuck to my body like it was glued to my back. It was a real struggle to get it off.   I have avoided driving, so I have not been for the lung x-ray yet.  Would you recommend a cortisone injection before my upcoming travels? I am going to [place] for 5 days on the 18th, and then to [facility] for a month on June 12th.    Best greetings.   pb

## 2024-05-30 ENCOUNTER — HOSPITAL ENCOUNTER (OUTPATIENT)
Facility: HOSPITAL | Age: 79
Discharge: HOME | End: 2024-05-30
Attending: FAMILY MEDICINE
Payer: MEDICARE

## 2024-05-30 DIAGNOSIS — R05.3 CHRONIC COUGH: ICD-10-CM

## 2024-05-30 PROCEDURE — 71046 X-RAY EXAM CHEST 2 VIEWS: CPT

## 2024-07-22 ENCOUNTER — HOSPITAL ENCOUNTER (OUTPATIENT)
Facility: HOSPITAL | Age: 79
Setting detail: RECURRING SERIES
Discharge: HOME | End: 2024-07-25
Payer: MEDICARE

## 2024-07-22 PROCEDURE — 97110 THERAPEUTIC EXERCISES: CPT | Performed by: PHYSICAL THERAPIST

## 2024-07-22 PROCEDURE — 97161 PT EVAL LOW COMPLEX 20 MIN: CPT | Performed by: PHYSICAL THERAPIST

## 2024-07-22 PROCEDURE — 97535 SELF CARE MNGMENT TRAINING: CPT | Performed by: PHYSICAL THERAPIST

## 2024-07-22 NOTE — THERAPY EVALUATION
condition  Current symptoms/Complaints: L shoulder pain  Mechanism of Injury: falling/ reaching with an outstretched wall  PLOF: Pt enjoys walking, indoor cycling, stretching  Limitations to PLOF/Activity or Recreational Limitations: Lifting luggage, reaching overhead  Work Hx: Not working  Living Situation: Pt lives alone  Mobility: I  Self Care: I  Previous Treatment/Compliance: Yes, good compliance with HEP  PMHx/Surgical Hx/Comorbidites: R rotator cuff and 1 biceps repaired 10 years ago, herniated disc, atherosclerosis   Prior Hospitalization:  None  Barriers: [x]pain []Financial []time []transportation []Other:  Substance use: []Alcohol []Tobacco []other:   Pt Goals: \"get better\"  Motivation: good  Cognition: A & O x 4         OBJECTIVE    Upper Extremity AROM:         R   L  Shoulder Flexion  150   120       Shoulder Abduction  170   150  Shoulder Extension  60   54  Shoulder ER   65   5  Shoulder IR   T7    T7             UPPER QUARTER   MUSCLE STRENGTH  KEY       R  L  0 - No Contraction  C1, C2 Neck Flex 5    1 - Trace   C3 Side Flex  5  5    2 - Poor   C4 Sh Elev  5  5    3 - Fair    C5 Deltoid/Biceps 5  5    4 - Good   C6 Wrist Ext  5  5    5 - Normal   C7 Triceps  5  5        C8 Thumb Ext  5  5        T1 Hand Inst  5  5      MMT: See above  Shoulder flexion  Shoulder ER R 5/5 L 3/5 p!  Shoulder IR R 5/5 L 4/5 p!     Special tests:  + L ER lag  + painful arc    Objective/Functional Outcome Measure: shoulder  FOTO Score: 54  FOTO score = an established functional score where 100 = no disability      30 min [x]Eval - untimed                        Therapeutic Procedures:  Tx Min Billable or 1:1 Min (if diff from Tx Min) Procedure, Rationale, Specifics   15  [id number] Therapeutic Exercise (timed):  increase ROM, strength, coordination, balance, and proprioception to improve patient's ability to progress to PLOF and address remaining functional goals. (see flow sheet as applicable)    Details if applicable:

## 2024-08-13 ENCOUNTER — HOSPITAL ENCOUNTER (OUTPATIENT)
Facility: HOSPITAL | Age: 79
Setting detail: RECURRING SERIES
Discharge: HOME | End: 2024-08-16
Payer: MEDICARE

## 2024-08-13 PROCEDURE — 97110 THERAPEUTIC EXERCISES: CPT | Performed by: PHYSICAL THERAPIST

## 2024-08-13 NOTE — PROGRESS NOTES
PHYSICAL THERAPY - MEDICARE DAILY TREATMENT NOTE (updated 3/23)      Date: 2024          Patient Name:  Baldo Sosa :  1945   Medical   Diagnosis:  Pain in left shoulder [M25.512] Treatment Diagnosis:  M25.512  LEFT SHOULDER PAIN    Referral Source:  Graham Dickerson MD Insurance:   Payor: MEDICARE / Plan: MEDICARE PART A AND B / Product Type: *No Product type* /                     Patient  verified yes     Visit #   Current  / Total 2 12   Time   In / Out 125  PM   Total Treatment Time 40   Total Timed Codes 40   1:1 Treatment Time 40      [facility] Totals Reminder:  bill using total billable   min of TIMED therapeutic procedures and modalities.   8-22 min = 1 unit; 23-37 min = 2 units; 38-52 min = 3 units; 53-67 min = 4 units; 68-82 min = 5 units            SUBJECTIVE    Pain Level (0-10 scale): 0    Any medication changes, allergies to medications, adverse drug reactions, diagnosis change, or new procedure performed?: [x] No    [] Yes (see summary sheet for update)  Medications: Verified on Patient Summary List    Subjective functional status/changes:     Pt had fall on  but he didn't injure himself  \"I have been compliant with HEP and my arm hasn't felt this good in a long time\"    OBJECTIVE      Therapeutic Procedures:  Tx Min Billable or 1:1 Min (if diff from Tx Min) Procedure, Rationale, Specifics   40  [id number] Therapeutic Exercise (timed):  increase ROM, strength, coordination, balance, and proprioception to improve patient's ability to progress to PLOF and address remaining functional goals. (see flow sheet as applicable)     Details if applicable:     40     Total Total       [x]  Patient Education billed concurrently with other procedures   [x] Review HEP    [] Progressed/Changed HEP, detail:    [] Other detail:         Other Objective/Functional Measures  No pain with today's interventions    Pain Level at end of session (0-10 scale): 0      Assessment   Excellent exercise

## 2024-08-27 ENCOUNTER — APPOINTMENT (OUTPATIENT)
Facility: HOSPITAL | Age: 79
End: 2024-08-27
Payer: MEDICARE

## 2024-09-10 ENCOUNTER — HOSPITAL ENCOUNTER (OUTPATIENT)
Facility: HOSPITAL | Age: 79
Setting detail: RECURRING SERIES
Discharge: HOME | End: 2024-09-13
Payer: MEDICARE

## 2024-09-10 PROCEDURE — 97110 THERAPEUTIC EXERCISES: CPT | Performed by: PHYSICAL THERAPIST

## 2024-09-19 RX ORDER — BUPROPION HYDROCHLORIDE 300 MG/1
300 TABLET ORAL EVERY MORNING
Qty: 90 TABLET | Refills: 1 | Status: SHIPPED | OUTPATIENT
Start: 2024-09-19

## 2024-10-30 ENCOUNTER — TELEPHONE (OUTPATIENT)
Facility: CLINIC | Age: 79
End: 2024-10-30

## 2024-10-30 DIAGNOSIS — N52.9 ERECTILE DYSFUNCTION, UNSPECIFIED ERECTILE DYSFUNCTION TYPE: ICD-10-CM

## 2024-10-30 DIAGNOSIS — E78.5 HYPERLIPIDEMIA, UNSPECIFIED HYPERLIPIDEMIA TYPE: ICD-10-CM

## 2024-10-30 DIAGNOSIS — I10 BENIGN ESSENTIAL HTN: Primary | ICD-10-CM

## 2024-10-30 DIAGNOSIS — R73.01 IMPAIRED FASTING GLUCOSE: ICD-10-CM

## 2024-10-30 DIAGNOSIS — E55.9 VITAMIN D INSUFFICIENCY: ICD-10-CM

## 2024-11-07 DIAGNOSIS — N52.9 ERECTILE DYSFUNCTION, UNSPECIFIED ERECTILE DYSFUNCTION TYPE: ICD-10-CM

## 2024-11-07 DIAGNOSIS — I10 BENIGN ESSENTIAL HTN: ICD-10-CM

## 2024-11-07 DIAGNOSIS — E78.5 HYPERLIPIDEMIA, UNSPECIFIED HYPERLIPIDEMIA TYPE: ICD-10-CM

## 2024-11-07 DIAGNOSIS — E55.9 VITAMIN D INSUFFICIENCY: ICD-10-CM

## 2024-11-07 DIAGNOSIS — R73.01 IMPAIRED FASTING GLUCOSE: ICD-10-CM

## 2024-11-07 LAB
25(OH)D3 SERPL-MCNC: 45.8 NG/ML (ref 30–100)
ALBUMIN SERPL-MCNC: 4 G/DL (ref 3.5–5)
ALBUMIN/GLOB SERPL: 1.4 (ref 1.1–2.2)
ALP SERPL-CCNC: 37 U/L (ref 45–117)
ALT SERPL-CCNC: 33 U/L (ref 12–78)
ANION GAP SERPL CALC-SCNC: 4 MMOL/L (ref 2–12)
AST SERPL-CCNC: 19 U/L (ref 15–37)
BASOPHILS # BLD: 0 K/UL (ref 0–0.1)
BASOPHILS NFR BLD: 1 % (ref 0–1)
BILIRUB SERPL-MCNC: 0.7 MG/DL (ref 0.2–1)
BUN SERPL-MCNC: 19 MG/DL (ref 6–20)
BUN/CREAT SERPL: 18 (ref 12–20)
CALCIUM SERPL-MCNC: 9.1 MG/DL (ref 8.5–10.1)
CHLORIDE SERPL-SCNC: 105 MMOL/L (ref 97–108)
CHOLEST SERPL-MCNC: 117 MG/DL
CO2 SERPL-SCNC: 30 MMOL/L (ref 21–32)
CREAT SERPL-MCNC: 1.04 MG/DL (ref 0.7–1.3)
DIFFERENTIAL METHOD BLD: ABNORMAL
EOSINOPHIL # BLD: 0.3 K/UL (ref 0–0.4)
EOSINOPHIL NFR BLD: 5 % (ref 0–7)
ERYTHROCYTE [DISTWIDTH] IN BLOOD BY AUTOMATED COUNT: 12.3 % (ref 11.5–14.5)
EST. AVERAGE GLUCOSE BLD GHB EST-MCNC: 108 MG/DL
GLOBULIN SER CALC-MCNC: 2.8 G/DL (ref 2–4)
GLUCOSE SERPL-MCNC: 98 MG/DL (ref 65–100)
HBA1C MFR BLD: 5.4 % (ref 4–5.6)
HCT VFR BLD AUTO: 39.5 % (ref 36.6–50.3)
HDLC SERPL-MCNC: 52 MG/DL
HDLC SERPL: 2.3 (ref 0–5)
HGB BLD-MCNC: 13.4 G/DL (ref 12.1–17)
IMM GRANULOCYTES # BLD AUTO: 0 K/UL (ref 0–0.04)
IMM GRANULOCYTES NFR BLD AUTO: 0 % (ref 0–0.5)
LDLC SERPL CALC-MCNC: 49.8 MG/DL (ref 0–100)
LYMPHOCYTES # BLD: 1.1 K/UL (ref 0.8–3.5)
LYMPHOCYTES NFR BLD: 20 % (ref 12–49)
MCH RBC QN AUTO: 32.8 PG (ref 26–34)
MCHC RBC AUTO-ENTMCNC: 33.9 G/DL (ref 30–36.5)
MCV RBC AUTO: 96.8 FL (ref 80–99)
MONOCYTES # BLD: 0.7 K/UL (ref 0–1)
MONOCYTES NFR BLD: 12 % (ref 5–13)
NEUTS SEG # BLD: 3.5 K/UL (ref 1.8–8)
NEUTS SEG NFR BLD: 62 % (ref 32–75)
NRBC # BLD: 0 K/UL (ref 0–0.01)
NRBC BLD-RTO: 0 PER 100 WBC
PLATELET # BLD AUTO: 222 K/UL (ref 150–400)
PMV BLD AUTO: 9.6 FL (ref 8.9–12.9)
POTASSIUM SERPL-SCNC: 3.8 MMOL/L (ref 3.5–5.1)
PROT SERPL-MCNC: 6.8 G/DL (ref 6.4–8.2)
RBC # BLD AUTO: 4.08 M/UL (ref 4.1–5.7)
SODIUM SERPL-SCNC: 139 MMOL/L (ref 136–145)
TRIGL SERPL-MCNC: 76 MG/DL
VLDLC SERPL CALC-MCNC: 15.2 MG/DL
WBC # BLD AUTO: 5.7 K/UL (ref 4.1–11.1)

## 2024-11-09 SDOH — ECONOMIC STABILITY: FOOD INSECURITY: WITHIN THE PAST 12 MONTHS, YOU WORRIED THAT YOUR FOOD WOULD RUN OUT BEFORE YOU GOT MONEY TO BUY MORE.: NEVER TRUE

## 2024-11-09 SDOH — ECONOMIC STABILITY: FOOD INSECURITY: WITHIN THE PAST 12 MONTHS, THE FOOD YOU BOUGHT JUST DIDN'T LAST AND YOU DIDN'T HAVE MONEY TO GET MORE.: NEVER TRUE

## 2024-11-09 SDOH — ECONOMIC STABILITY: INCOME INSECURITY: HOW HARD IS IT FOR YOU TO PAY FOR THE VERY BASICS LIKE FOOD, HOUSING, MEDICAL CARE, AND HEATING?: NOT HARD AT ALL

## 2024-11-12 ENCOUNTER — OFFICE VISIT (OUTPATIENT)
Facility: CLINIC | Age: 79
End: 2024-11-12

## 2024-11-12 VITALS
TEMPERATURE: 97.4 F | DIASTOLIC BLOOD PRESSURE: 64 MMHG | WEIGHT: 151 LBS | BODY MASS INDEX: 23.7 KG/M2 | RESPIRATION RATE: 16 BRPM | HEIGHT: 67 IN | SYSTOLIC BLOOD PRESSURE: 114 MMHG | HEART RATE: 60 BPM

## 2024-11-12 DIAGNOSIS — Z12.5 PROSTATE CANCER SCREENING: ICD-10-CM

## 2024-11-12 DIAGNOSIS — E55.9 VITAMIN D INSUFFICIENCY: Primary | ICD-10-CM

## 2024-11-12 DIAGNOSIS — F32.4 MAJOR DEPRESSIVE DISORDER WITH SINGLE EPISODE, IN PARTIAL REMISSION (HCC): ICD-10-CM

## 2024-11-12 DIAGNOSIS — R73.01 IMPAIRED FASTING GLUCOSE: ICD-10-CM

## 2024-11-12 DIAGNOSIS — I10 BENIGN ESSENTIAL HTN: ICD-10-CM

## 2024-11-12 DIAGNOSIS — R39.15 URINARY URGENCY: ICD-10-CM

## 2024-11-12 DIAGNOSIS — K21.9 GASTROESOPHAGEAL REFLUX DISEASE, UNSPECIFIED WHETHER ESOPHAGITIS PRESENT: ICD-10-CM

## 2024-11-12 DIAGNOSIS — E78.5 HYPERLIPIDEMIA, UNSPECIFIED HYPERLIPIDEMIA TYPE: ICD-10-CM

## 2024-11-12 PROBLEM — R05.3 CHRONIC COUGH: Status: RESOLVED | Noted: 2024-05-02 | Resolved: 2024-11-12

## 2024-11-12 PROBLEM — M25.512 CHRONIC LEFT SHOULDER PAIN: Status: RESOLVED | Noted: 2024-05-02 | Resolved: 2024-11-12

## 2024-11-12 PROBLEM — G89.29 CHRONIC LEFT SHOULDER PAIN: Status: RESOLVED | Noted: 2024-05-02 | Resolved: 2024-11-12

## 2024-11-12 RX ORDER — BUPROPION HYDROCHLORIDE 300 MG/1
300 TABLET ORAL EVERY MORNING
Qty: 90 TABLET | Refills: 1 | Status: SHIPPED | OUTPATIENT
Start: 2024-11-12

## 2024-11-12 RX ORDER — SILDENAFIL 100 MG/1
100 TABLET, FILM COATED ORAL PRN
Qty: 30 TABLET | Refills: 3 | Status: SHIPPED | OUTPATIENT
Start: 2024-11-12

## 2024-11-12 SDOH — ECONOMIC STABILITY: INCOME INSECURITY: HOW HARD IS IT FOR YOU TO PAY FOR THE VERY BASICS LIKE FOOD, HOUSING, MEDICAL CARE, AND HEATING?: NOT HARD AT ALL

## 2024-11-12 SDOH — ECONOMIC STABILITY: FOOD INSECURITY: WITHIN THE PAST 12 MONTHS, YOU WORRIED THAT YOUR FOOD WOULD RUN OUT BEFORE YOU GOT MONEY TO BUY MORE.: NEVER TRUE

## 2024-11-12 SDOH — ECONOMIC STABILITY: FOOD INSECURITY: WITHIN THE PAST 12 MONTHS, THE FOOD YOU BOUGHT JUST DIDN'T LAST AND YOU DIDN'T HAVE MONEY TO GET MORE.: NEVER TRUE

## 2024-11-12 NOTE — PROGRESS NOTES
Chief Complaint   Patient presents with    Follow-up     6 mo fu.  Things have been going well.  Bp readings at home are well wnl. Mood has been good lately.     Depression

## 2024-11-12 NOTE — PROGRESS NOTES
Assessment & Plan  1. Major Depressive Disorder.  Continuation of Wellbutrin 300 mg extended release is advised. The patient has been on this medication for 24 years and previous attempts to discontinue it were unsuccessful. He reports that the medication has been beneficial, especially during difficult times such as the passing of his wife.    2. Hypertension.  A reduction in the dosage of atenolol 50 mg twice a day is recommended, which will be discussed with Dr. Berger. The patient's home blood pressure readings are generally within the desired range, but today's reading was high. He is also on losartan hydrochlorothiazide 50-12.5 mg.    3. Urinary Urgency.  A reduction in the intake of diuretics, carbonated beverages, and alcohol is suggested. The patient reports increased urinary urgency over the past 6-7 months, which may be exacerbated by his current diuretic medication (Hyzaar). He is advised to discuss the possibility of switching back to Cozaar with Dr. Berger.    4. Prediabetes.  The patient's A1C has returned to normal levels. He is advised to continue his current regimen, including walking 2 miles almost every day.    5. Hyperlipidemia.  Continuation of Lipitor 60 mg daily is recommended. The patient's cholesterol levels are well-controlled with this medication.    6. Gastroesophageal Reflux Disease (GERD).  The patient is advised to continue taking omeprazole every third day as he has been successfully weaning off it without issues.    7. Chronic Left Shoulder Pain.  The patient reports significant improvement after physical therapy. He is advised to continue the exercises to prevent recurrence.    8. Health Maintenance.  Continuation of vitamin D 2000 units daily is recommended. Regular exercise is encouraged to maintain overall health.      It was a pleasure seeing Mr. Baldo Sosa today.  Return in about 6 months (around 5/12/2025) for Medicare AWV/chronic disease management.    History of Present

## 2024-11-15 ENCOUNTER — HOSPITAL ENCOUNTER (OUTPATIENT)
Facility: HOSPITAL | Age: 79
Setting detail: RECURRING SERIES
Discharge: HOME | End: 2024-11-18
Payer: MEDICARE

## 2024-11-15 PROCEDURE — 97110 THERAPEUTIC EXERCISES: CPT | Performed by: PHYSICAL THERAPIST

## 2024-11-15 PROCEDURE — 97112 NEUROMUSCULAR REEDUCATION: CPT | Performed by: PHYSICAL THERAPIST

## 2024-11-15 NOTE — THERAPY DISCHARGE
Physical Therapy at [place],   a part of [facility]  [address], [address]  [place] [address]  Phone: [phone]  Fax: [phone]    DISCHARGE SUMMARY  Patient Name: Baldo Sosa : 1945   Treatment/Medical Diagnosis: Pain in left shoulder [M25.512]   Referral Source: Graham Dickerson MD     Date of Initial Visit: 24 Attended Visits: 4 Missed Visits: 0     SUMMARY OF TREATMENT    Pt attended initial evaluation and     3     follow-ups.  He has met all short and long term goals.  He no longer has pain and demonstrates improved shoulder ROM multi directionally.  FOTO improved from 54% at IE to 66% at today's visit.      Short Term Goals: To be accomplished in 8 treatments. ALL MET  1) The patient will be independent with introductory HEP   2) The patient will improve L shoulder flexion AROM to 125 deg to improve ease with reaching tasks  3) The patient will improve L shoulder ER AROM To 10 deg to improve ease with dressing  Long Term Goals: To be accomplished in 12 treatments. ALL MET  1) The patient will be independent with finalized HEP   2) The patient will improve L shoulder flexion AROM to > 130 deg to improve ease with putting dishes away   3) The patient will improve foto survey to a 66% or greater to indicate a significant improvement in function     RECOMMENDATIONS  Discontinue therapy, all goals met.        Anu Marsh, PT       11/15/2024       7:46 AM    If you have any questions/comments please contact us directly at [phone].   Thank you for allowing us to assist in the care of your patient.

## 2024-11-15 NOTE — PROGRESS NOTES
PHYSICAL THERAPY - MEDICARE DAILY TREATMENT NOTE (updated 3/23)      Date: 11/15/2024          Patient Name:  Baldo Sosa :  1945   Medical   Diagnosis:  Pain in left shoulder [M25.512] Treatment Diagnosis:  M25.512  LEFT SHOULDER PAIN    Referral Source:  Graham Dickerson MD Insurance:   Payor: MEDICARE / Plan: MEDICARE PART A AND B / Product Type: *No Product type* /                     Patient  verified yes     Visit #   Current  / Total 4 12   Time   In / Out 700 740 AM   Total Treatment Time 40   Total Timed Codes 40   1:1 Treatment Time 40      [facility] Totals Reminder:  bill using total billable   min of TIMED therapeutic procedures and modalities.   8-22 min = 1 unit; 23-37 min = 2 units; 38-52 min = 3 units; 53-67 min = 4 units; 68-82 min = 5 units            SUBJECTIVE    Pain Level (0-10 scale): 0    Any medication changes, allergies to medications, adverse drug reactions, diagnosis change, or new procedure performed?: [x] No    [] Yes (see summary sheet for update)  Medications: Verified on Patient Summary List    Subjective functional status/changes:     Pt reports he is not having any pain \"I feel like I'm ready for graduation\"    OBJECTIVE      Therapeutic Procedures:  Tx Min Billable or 1:1 Min (if diff from Tx Min) Procedure, Rationale, Specifics   40  13284 Therapeutic Exercise (timed):  increase ROM, strength, coordination, balance, and proprioception to improve patient's ability to progress to PLOF and address remaining functional goals. (see flow sheet as applicable)     Details if applicable:     40     Total Total       [x]  Patient Education billed concurrently with other procedures   [x] Review HEP    [] Progressed/Changed HEP, detail:    [] Other detail:         Other Objective/Functional Measures  Shoulder AROM  Flexion 150 deg  ER 12 deg    FOTO 66%  Pain Level at end of session (0-10 scale): 0      Assessment       Progress toward goals / Updated goals:  [x]  Discharge

## 2025-01-26 DIAGNOSIS — I10 BENIGN ESSENTIAL HTN: ICD-10-CM

## 2025-01-27 RX ORDER — ATENOLOL 50 MG/1
TABLET ORAL
Qty: 180 TABLET | Refills: 1 | Status: SHIPPED | OUTPATIENT
Start: 2025-01-27

## 2025-02-13 DIAGNOSIS — I25.10 CORONARY ARTERY DISEASE, NON-OCCLUSIVE: ICD-10-CM

## 2025-02-13 DIAGNOSIS — R93.1 HIGH CORONARY ARTERY CALCIUM SCORE: ICD-10-CM

## 2025-02-13 RX ORDER — ATORVASTATIN CALCIUM 20 MG/1
TABLET, FILM COATED ORAL
Qty: 90 TABLET | Refills: 1 | Status: SHIPPED | OUTPATIENT
Start: 2025-02-13

## 2025-02-13 RX ORDER — ATORVASTATIN CALCIUM 40 MG/1
TABLET, FILM COATED ORAL
Qty: 90 TABLET | Refills: 1 | Status: SHIPPED | OUTPATIENT
Start: 2025-02-13

## 2025-04-04 DIAGNOSIS — I10 BENIGN ESSENTIAL HTN: ICD-10-CM

## 2025-04-04 RX ORDER — LOSARTAN POTASSIUM AND HYDROCHLOROTHIAZIDE 12.5; 5 MG/1; MG/1
1 TABLET ORAL 2 TIMES DAILY
Qty: 180 TABLET | Refills: 0 | Status: SHIPPED | OUTPATIENT
Start: 2025-04-04

## 2025-05-06 DIAGNOSIS — E78.5 HYPERLIPIDEMIA, UNSPECIFIED HYPERLIPIDEMIA TYPE: ICD-10-CM

## 2025-05-06 DIAGNOSIS — R73.01 IMPAIRED FASTING GLUCOSE: ICD-10-CM

## 2025-05-06 DIAGNOSIS — E55.9 VITAMIN D INSUFFICIENCY: ICD-10-CM

## 2025-05-06 DIAGNOSIS — I10 BENIGN ESSENTIAL HTN: ICD-10-CM

## 2025-05-06 DIAGNOSIS — Z12.5 PROSTATE CANCER SCREENING: ICD-10-CM

## 2025-05-06 LAB
25(OH)D3 SERPL-MCNC: 53 NG/ML (ref 30–100)
ALBUMIN SERPL-MCNC: 3.9 G/DL (ref 3.5–5)
ALBUMIN/GLOB SERPL: 1.4 (ref 1.1–2.2)
ALP SERPL-CCNC: 39 U/L (ref 45–117)
ALT SERPL-CCNC: 35 U/L (ref 12–78)
ANION GAP SERPL CALC-SCNC: 5 MMOL/L (ref 2–12)
AST SERPL-CCNC: 23 U/L (ref 15–37)
BILIRUB SERPL-MCNC: 0.5 MG/DL (ref 0.2–1)
BUN SERPL-MCNC: 18 MG/DL (ref 6–20)
BUN/CREAT SERPL: 16 (ref 12–20)
CALCIUM SERPL-MCNC: 9.6 MG/DL (ref 8.5–10.1)
CHLORIDE SERPL-SCNC: 103 MMOL/L (ref 97–108)
CO2 SERPL-SCNC: 30 MMOL/L (ref 21–32)
CREAT SERPL-MCNC: 1.14 MG/DL (ref 0.7–1.3)
CREAT UR-MCNC: 108 MG/DL
ERYTHROCYTE [DISTWIDTH] IN BLOOD BY AUTOMATED COUNT: 12.4 % (ref 11.5–14.5)
EST. AVERAGE GLUCOSE BLD GHB EST-MCNC: 111 MG/DL
GLOBULIN SER CALC-MCNC: 2.8 G/DL (ref 2–4)
GLUCOSE SERPL-MCNC: 113 MG/DL (ref 65–100)
HBA1C MFR BLD: 5.5 % (ref 4–5.6)
HCT VFR BLD AUTO: 41.3 % (ref 36.6–50.3)
HGB BLD-MCNC: 13.6 G/DL (ref 12.1–17)
MCH RBC QN AUTO: 32.4 PG (ref 26–34)
MCHC RBC AUTO-ENTMCNC: 32.9 G/DL (ref 30–36.5)
MCV RBC AUTO: 98.3 FL (ref 80–99)
MICROALBUMIN UR-MCNC: 0.54 MG/DL
MICROALBUMIN/CREAT UR-RTO: 5 MG/G (ref 0–30)
NRBC # BLD: 0 K/UL (ref 0–0.01)
NRBC BLD-RTO: 0 PER 100 WBC
PLATELET # BLD AUTO: 224 K/UL (ref 150–400)
PMV BLD AUTO: 9.6 FL (ref 8.9–12.9)
POTASSIUM SERPL-SCNC: 3.8 MMOL/L (ref 3.5–5.1)
PROT SERPL-MCNC: 6.7 G/DL (ref 6.4–8.2)
PSA SERPL-MCNC: 3.9 NG/ML (ref 0.01–4)
RBC # BLD AUTO: 4.2 M/UL (ref 4.1–5.7)
SODIUM SERPL-SCNC: 138 MMOL/L (ref 136–145)
WBC # BLD AUTO: 5.5 K/UL (ref 4.1–11.1)

## 2025-05-07 LAB
CHOLEST SERPL-MCNC: 131 MG/DL (ref 100–199)
HDL SERPL-SCNC: 36.7 UMOL/L
HDLC SERPL-MCNC: 56 MG/DL
LDL SERPL QN: 20.4 NM
LDL SERPL-SCNC: 690 NMOL/L
LDL SMALL SERPL-SCNC: 448 NMOL/L
LDLC SERPL CALC-MCNC: 59 MG/DL (ref 0–99)
LP-IR SCORE SERPL: 59
TRIGL SERPL-MCNC: 83 MG/DL (ref 0–149)

## 2025-05-09 SDOH — HEALTH STABILITY: PHYSICAL HEALTH: ON AVERAGE, HOW MANY DAYS PER WEEK DO YOU ENGAGE IN MODERATE TO STRENUOUS EXERCISE (LIKE A BRISK WALK)?: 5 DAYS

## 2025-05-09 SDOH — HEALTH STABILITY: PHYSICAL HEALTH: ON AVERAGE, HOW MANY MINUTES DO YOU ENGAGE IN EXERCISE AT THIS LEVEL?: 50 MIN

## 2025-05-09 ASSESSMENT — PATIENT HEALTH QUESTIONNAIRE - PHQ9
SUM OF ALL RESPONSES TO PHQ QUESTIONS 1-9: 1
2. FEELING DOWN, DEPRESSED OR HOPELESS: SEVERAL DAYS
SUM OF ALL RESPONSES TO PHQ QUESTIONS 1-9: 1
1. LITTLE INTEREST OR PLEASURE IN DOING THINGS: NOT AT ALL

## 2025-05-09 ASSESSMENT — LIFESTYLE VARIABLES
HOW MANY STANDARD DRINKS CONTAINING ALCOHOL DO YOU HAVE ON A TYPICAL DAY: 1
HOW OFTEN DO YOU HAVE A DRINK CONTAINING ALCOHOL: 4
HOW OFTEN DO YOU HAVE SIX OR MORE DRINKS ON ONE OCCASION: 1
HOW MANY STANDARD DRINKS CONTAINING ALCOHOL DO YOU HAVE ON A TYPICAL DAY: 1 OR 2
HOW OFTEN DO YOU HAVE A DRINK CONTAINING ALCOHOL: 2-3 TIMES A WEEK

## 2025-05-14 SDOH — ECONOMIC STABILITY: TRANSPORTATION INSECURITY
IN THE PAST 12 MONTHS, HAS THE LACK OF TRANSPORTATION KEPT YOU FROM MEDICAL APPOINTMENTS OR FROM GETTING MEDICATIONS?: NO

## 2025-05-14 SDOH — ECONOMIC STABILITY: INCOME INSECURITY: IN THE LAST 12 MONTHS, WAS THERE A TIME WHEN YOU WERE NOT ABLE TO PAY THE MORTGAGE OR RENT ON TIME?: NO

## 2025-05-14 SDOH — ECONOMIC STABILITY: FOOD INSECURITY: WITHIN THE PAST 12 MONTHS, YOU WORRIED THAT YOUR FOOD WOULD RUN OUT BEFORE YOU GOT MONEY TO BUY MORE.: NEVER TRUE

## 2025-05-14 SDOH — ECONOMIC STABILITY: FOOD INSECURITY: WITHIN THE PAST 12 MONTHS, THE FOOD YOU BOUGHT JUST DIDN'T LAST AND YOU DIDN'T HAVE MONEY TO GET MORE.: NEVER TRUE

## 2025-05-16 ENCOUNTER — OFFICE VISIT (OUTPATIENT)
Facility: CLINIC | Age: 80
End: 2025-05-16

## 2025-05-16 VITALS
HEIGHT: 67 IN | BODY MASS INDEX: 23.7 KG/M2 | WEIGHT: 151 LBS | SYSTOLIC BLOOD PRESSURE: 127 MMHG | TEMPERATURE: 97.6 F | RESPIRATION RATE: 16 BRPM | HEART RATE: 98 BPM | DIASTOLIC BLOOD PRESSURE: 73 MMHG

## 2025-05-16 DIAGNOSIS — F33.1 MAJOR DEPRESSIVE DISORDER, RECURRENT, MODERATE (HCC): ICD-10-CM

## 2025-05-16 DIAGNOSIS — E55.9 VITAMIN D INSUFFICIENCY: ICD-10-CM

## 2025-05-16 DIAGNOSIS — F51.04 PSYCHOPHYSIOLOGICAL INSOMNIA: ICD-10-CM

## 2025-05-16 DIAGNOSIS — N52.9 ERECTILE DYSFUNCTION, UNSPECIFIED ERECTILE DYSFUNCTION TYPE: ICD-10-CM

## 2025-05-16 DIAGNOSIS — R73.01 IMPAIRED FASTING GLUCOSE: ICD-10-CM

## 2025-05-16 DIAGNOSIS — E78.5 HYPERLIPIDEMIA, UNSPECIFIED HYPERLIPIDEMIA TYPE: ICD-10-CM

## 2025-05-16 DIAGNOSIS — F32.4 MAJOR DEPRESSIVE DISORDER WITH SINGLE EPISODE, IN PARTIAL REMISSION: ICD-10-CM

## 2025-05-16 DIAGNOSIS — I10 WHITE COAT SYNDROME WITH DIAGNOSIS OF HYPERTENSION: ICD-10-CM

## 2025-05-16 DIAGNOSIS — Z00.00 MEDICARE ANNUAL WELLNESS VISIT, SUBSEQUENT: Primary | ICD-10-CM

## 2025-05-16 RX ORDER — BUPROPION HYDROCHLORIDE 300 MG/1
300 TABLET ORAL EVERY MORNING
Qty: 90 TABLET | Refills: 1 | Status: CANCELLED | OUTPATIENT
Start: 2025-05-16

## 2025-05-16 RX ORDER — SILDENAFIL 100 MG/1
100 TABLET, FILM COATED ORAL PRN
Qty: 30 TABLET | Refills: 3 | Status: CANCELLED | OUTPATIENT
Start: 2025-05-16

## 2025-05-16 RX ORDER — BUPROPION HYDROCHLORIDE 300 MG/1
300 TABLET ORAL EVERY MORNING
Qty: 90 TABLET | Refills: 3 | Status: SHIPPED | OUTPATIENT
Start: 2025-05-16

## 2025-05-16 SDOH — ECONOMIC STABILITY: FOOD INSECURITY: WITHIN THE PAST 12 MONTHS, THE FOOD YOU BOUGHT JUST DIDN'T LAST AND YOU DIDN'T HAVE MONEY TO GET MORE.: NEVER TRUE

## 2025-05-16 SDOH — ECONOMIC STABILITY: FOOD INSECURITY: WITHIN THE PAST 12 MONTHS, YOU WORRIED THAT YOUR FOOD WOULD RUN OUT BEFORE YOU GOT MONEY TO BUY MORE.: NEVER TRUE

## 2025-05-16 ASSESSMENT — PATIENT HEALTH QUESTIONNAIRE - PHQ9
2. FEELING DOWN, DEPRESSED OR HOPELESS: NOT AT ALL
1. LITTLE INTEREST OR PLEASURE IN DOING THINGS: NOT AT ALL
8. MOVING OR SPEAKING SO SLOWLY THAT OTHER PEOPLE COULD HAVE NOTICED. OR THE OPPOSITE, BEING SO FIGETY OR RESTLESS THAT YOU HAVE BEEN MOVING AROUND A LOT MORE THAN USUAL: NOT AT ALL
SUM OF ALL RESPONSES TO PHQ QUESTIONS 1-9: 2
7. TROUBLE CONCENTRATING ON THINGS, SUCH AS READING THE NEWSPAPER OR WATCHING TELEVISION: NOT AT ALL
5. POOR APPETITE OR OVEREATING: NOT AT ALL
6. FEELING BAD ABOUT YOURSELF - OR THAT YOU ARE A FAILURE OR HAVE LET YOURSELF OR YOUR FAMILY DOWN: NOT AT ALL
9. THOUGHTS THAT YOU WOULD BE BETTER OFF DEAD, OR OF HURTING YOURSELF: NOT AT ALL
3. TROUBLE FALLING OR STAYING ASLEEP: MORE THAN HALF THE DAYS
4. FEELING TIRED OR HAVING LITTLE ENERGY: NOT AT ALL
SUM OF ALL RESPONSES TO PHQ QUESTIONS 1-9: 2
10. IF YOU CHECKED OFF ANY PROBLEMS, HOW DIFFICULT HAVE THESE PROBLEMS MADE IT FOR YOU TO DO YOUR WORK, TAKE CARE OF THINGS AT HOME, OR GET ALONG WITH OTHER PEOPLE: SOMEWHAT DIFFICULT
SUM OF ALL RESPONSES TO PHQ QUESTIONS 1-9: 2
SUM OF ALL RESPONSES TO PHQ QUESTIONS 1-9: 2

## 2025-05-16 NOTE — PROGRESS NOTES
Chief Complaint   Patient presents with    Medicare AWV     AWV- would like to discuss side effects of meds used long term.     Discuss Labs     Had labs drawn and would like to discuss.     Sleep Problem     4 year hx of trouble sleeping since his wife past away in 2021.  This issue comes and goes.

## 2025-05-16 NOTE — PROGRESS NOTES
Medicare Annual Wellness Visit    Baldo Sosa is here for Medicare AWV (AWV- would like to discuss side effects of meds used long term. ), Discuss Labs (Had labs drawn and would like to discuss. ), and Sleep Problem (4 year hx of trouble sleeping since his wife past away in 2021.  This issue comes and goes.  )    Assessment & Plan  0. Health Maintenance.  - Received tenth COVID-19 vaccine in 02/2025.  - Advised to wait until flu season for next COVID-19 vaccine.  - Encouraged to maintain vaccination schedule and follow up as needed.    1. Insomnia: Chronic.  - Insomnia may be due to chronic hydrochlorothiazide use, depleting magnesium and disrupting sleep.  - Provided handout on magnesium-rich foods.  - Advised magnesium supplement, 200-400 mg daily.  - Discussed potential benefits of magnesium for sleep and cognitive function.    2. Memory issues.  - Concerns about memory issues, inquired about OTC supplements like Prevagen and Nooceptin.  - Discussed benefits and drawbacks, including cost and safety profiles.  - Emphasized gut health and soluble fiber intake for cognitive function.  - Advised considering lion's quintin mushroom extracts for nootropic activity.    3. Depression: Stable.  - Depression well-managed with Wellbutrin.  - Reassured that sadness or loneliness does not indicate depression.  - Encouraged to continue current medication regimen and seek support if needed.    4. Coronary artery disease.  - LDL particle count normal, average particle size smaller.  - Discussed benefits of combining statins with ezetimibe.  - Advised consulting Dr. Maurice regarding ezetimibe addition.  - Recent studies suggest better outcomes with statins and ezetimibe for reducing heart attack and stroke risk.        Follow-up  - Consult Dr. Maurice regarding ezetimibe addition.    Medicare annual wellness visit, subsequent  Major depressive disorder with single episode, in partial remission  The following orders have not been

## 2025-05-16 NOTE — PATIENT INSTRUCTIONS
your medicines exactly as prescribed. Call your doctor if you think you are having a problem with your medicine.     If your doctor recommends aspirin, take the amount directed each day. Make sure you take aspirin and not another kind of pain reliever, such as acetaminophen (Tylenol).   When should you call for help?   Call 911 if you have symptoms of a heart attack. These may include:    Chest pain or pressure, or a strange feeling in the chest.     Sweating.     Shortness of breath.     Pain, pressure, or a strange feeling in the back, neck, jaw, or upper belly or in one or both shoulders or arms.     Lightheadedness or sudden weakness.     A fast or irregular heartbeat.   After you call 911, the  may tell you to chew 1 adult-strength or 2 to 4 low-dose aspirin. Wait for an ambulance. Do not try to drive yourself.  Watch closely for changes in your health, and be sure to contact your doctor if you have any problems.  Where can you learn more?  Go to https://www.[employer].net/patientEd and enter F075 to learn more about \"A Healthy Heart: Care Instructions.\"  Current as of: July 31, 2024  Content Version: 14.4  © 1483-1230 [employer].   Care instructions adapted under license by [employer]. If you have questions about a medical condition or this instruction, always ask your healthcare professional. [employer] disclaims any warranty or liability for your use of this information.    Personalized Preventive Plan for Baldo Sosa - 5/16/2025  Medicare offers a range of preventive health benefits. Some of the tests and screenings are paid in full while other may be subject to a deductible, co-insurance, and/or copay.  Some of these benefits include a comprehensive review of your medical history including lifestyle, illnesses that may run in your family, and various assessments and screenings as appropriate.  After reviewing your medical record and screening and assessments performed

## 2025-05-19 ENCOUNTER — OFFICE VISIT (OUTPATIENT)
Age: 80
End: 2025-05-19
Payer: MEDICARE

## 2025-05-19 VITALS
WEIGHT: 152.8 LBS | HEART RATE: 60 BPM | HEIGHT: 67 IN | OXYGEN SATURATION: 96 % | SYSTOLIC BLOOD PRESSURE: 134 MMHG | BODY MASS INDEX: 23.98 KG/M2 | DIASTOLIC BLOOD PRESSURE: 64 MMHG

## 2025-05-19 DIAGNOSIS — E78.5 HYPERLIPIDEMIA, UNSPECIFIED HYPERLIPIDEMIA TYPE: ICD-10-CM

## 2025-05-19 DIAGNOSIS — R93.1 HIGH CORONARY ARTERY CALCIUM SCORE: ICD-10-CM

## 2025-05-19 DIAGNOSIS — I10 BENIGN ESSENTIAL HTN: ICD-10-CM

## 2025-05-19 DIAGNOSIS — I25.10 CORONARY ARTERY DISEASE, NON-OCCLUSIVE: Primary | ICD-10-CM

## 2025-05-19 PROCEDURE — G8427 DOCREV CUR MEDS BY ELIG CLIN: HCPCS | Performed by: SPECIALIST

## 2025-05-19 PROCEDURE — 1036F TOBACCO NON-USER: CPT | Performed by: SPECIALIST

## 2025-05-19 PROCEDURE — 93010 ELECTROCARDIOGRAM REPORT: CPT | Performed by: SPECIALIST

## 2025-05-19 PROCEDURE — 1160F RVW MEDS BY RX/DR IN RCRD: CPT | Performed by: SPECIALIST

## 2025-05-19 PROCEDURE — 1126F AMNT PAIN NOTED NONE PRSNT: CPT | Performed by: SPECIALIST

## 2025-05-19 PROCEDURE — 99214 OFFICE O/P EST MOD 30 MIN: CPT | Performed by: SPECIALIST

## 2025-05-19 PROCEDURE — 1159F MED LIST DOCD IN RCRD: CPT | Performed by: SPECIALIST

## 2025-05-19 PROCEDURE — 3078F DIAST BP <80 MM HG: CPT | Performed by: SPECIALIST

## 2025-05-19 PROCEDURE — 3075F SYST BP GE 130 - 139MM HG: CPT | Performed by: SPECIALIST

## 2025-05-19 PROCEDURE — 93005 ELECTROCARDIOGRAM TRACING: CPT | Performed by: SPECIALIST

## 2025-05-19 PROCEDURE — G8420 CALC BMI NORM PARAMETERS: HCPCS | Performed by: SPECIALIST

## 2025-05-19 PROCEDURE — 1123F ACP DISCUSS/DSCN MKR DOCD: CPT | Performed by: SPECIALIST

## 2025-05-19 RX ORDER — ATORVASTATIN CALCIUM 20 MG/1
TABLET, FILM COATED ORAL
Qty: 90 TABLET | Refills: 3 | Status: SHIPPED | OUTPATIENT
Start: 2025-05-19

## 2025-05-19 RX ORDER — ATORVASTATIN CALCIUM 40 MG/1
TABLET, FILM COATED ORAL
Qty: 90 TABLET | Refills: 3 | Status: SHIPPED | OUTPATIENT
Start: 2025-05-19

## 2025-05-19 RX ORDER — OMEPRAZOLE 10 MG/1
10 CAPSULE, DELAYED RELEASE ORAL DAILY
COMMUNITY

## 2025-05-19 RX ORDER — ATENOLOL 50 MG/1
50 TABLET ORAL 2 TIMES DAILY
Qty: 180 TABLET | Refills: 3 | Status: SHIPPED | OUTPATIENT
Start: 2025-05-19

## 2025-05-19 RX ORDER — LOSARTAN POTASSIUM AND HYDROCHLOROTHIAZIDE 12.5; 5 MG/1; MG/1
1 TABLET ORAL 2 TIMES DAILY
Qty: 180 TABLET | Refills: 3 | Status: SHIPPED | OUTPATIENT
Start: 2025-05-19

## 2025-05-19 NOTE — PROGRESS NOTES
Aldo Berger MD. [facility]          Patient: Baldo Sosa  : 1945      Today's Date: 2025        HISTORY OF PRESENT ILLNESS:     History of Present Illness:    He is doing OK.  He exercises regularly.  BP OK at home.      PAST MEDICAL HISTORY:     Past Medical History:   Diagnosis Date    Arm paresthesia, right     from rototor cuff injury    Atherosclerosis     Benign essential HTN     Chronic cough 2024    Since COVID, intermittent. Not that bothersome. Would like a chest xray because of hx smoking. Stopped smoking in . Had 3 negative lung cancer screening xrays since that time.      Chronic left shoulder pain 2024    Intermittent, thought to be rotator cuff impingement without known injury. Is doing well with physical therapy, though cannot seem to completely shake it. If keeps up with exercises seems to do alright though. Not really interested in advanced imaging or surgery.      Coronary artery disease, non-occlusive     COVID-19     Had COVID in 2022    Depression, major     from divorce. on wellburtrin. failed prozac.  sees psych in [place]    ED (erectile dysfunction)     GERD (gastroesophageal reflux disease)     High coronary artery calcium score 2005    last stress test ;   CAC score 1400 in 2006 per patient    History of bronchitis     Hyperlipidemia     Insomnia     Personal history of multiple pulmonary nodules     no further f/u needed.  followed by serial CTs, last .      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    COLONOSCOPY      ?  normal per pt in [place]    ROTATOR CUFF REPAIR Right              CURRENT MEDICATIONS:    .  Current

## 2025-05-19 NOTE — PATIENT INSTRUCTIONS
Patient Education        Learning About the Mediterranean Diet  What is the Mediterranean diet?     The Mediterranean diet is a style of eating rather than a diet plan. It features foods eaten in [place], [place], [place], and other countries along the [place] Sea. It emphasizes eating foods like fish, fruits, vegetables, beans, high-fiber breads and whole grains, nuts, and olive oil. This style of eating includes limited red meat, cheese, and sweets.  Why choose the Mediterranean diet?  A Mediterranean-style diet may improve heart health. It contains more fat than other heart-healthy diets. But the fats are mainly from nuts, unsaturated oils (such as fish oils and olive oil), and certain nut or seed oils (such as canola, soybean, or flaxseed oil). These fats may help protect the heart and blood vessels.  How can you get started on the Mediterranean diet?  Here are some things you can do to switch to a more Mediterranean way of eating.  What to eat  Eat a variety of fruits and vegetables each day, such as grapes, blueberries, tomatoes, broccoli, peppers, figs, olives, spinach, eggplant, beans, lentils, and chickpeas.  Eat a variety of whole-grain foods each day, such as oats, brown rice, and whole wheat bread, pasta, and couscous.  Eat fish at least 2 times a week. Try tuna, salmon, mackerel, lake trout, herring, or sardines.  Eat moderate amounts of low-fat dairy products, such as milk, cheese, or yogurt.  Eat moderate amounts of poultry and eggs.  Choose healthy (unsaturated) fats, such as nuts, olive oil, and certain nut or seed oils like canola, soybean, and flaxseed.  Limit unhealthy (saturated) fats, such as butter, palm oil, and coconut oil. And limit fats found in animal products, such as meat and dairy products made with whole milk. Try to eat red meat only a few times a month in very small amounts.  Limit sweets and desserts to only a few times a week. This includes sugar-sweetened

## 2025-05-19 NOTE — PROGRESS NOTES
Chief Complaint   Patient presents with    Coronary Artery Disease    HLD     Vitals:    05/19/25 0807   BP: 134/64   BP Site: Left Upper Arm   Patient Position: Sitting   Pulse: 60   SpO2: 96%   Weight: 69.3 kg (152 lb 12.8 oz)   Height: 1.702 m (5' 7\")         Chest pain: DENIED     Recent hospital stays: DENIED     Refills: YES, PEND TO CHART 90 DAYS 3 [facility] CONFIRMED

## 2025-06-23 ENCOUNTER — TELEPHONE (OUTPATIENT)
Age: 80
End: 2025-06-23

## 2025-06-23 DIAGNOSIS — R07.89 CHEST DISCOMFORT: ICD-10-CM

## 2025-06-23 DIAGNOSIS — I25.10 CORONARY ARTERY DISEASE, NON-OCCLUSIVE: Primary | ICD-10-CM

## 2025-06-23 DIAGNOSIS — R93.1 HIGH CORONARY ARTERY CALCIUM SCORE: ICD-10-CM

## 2025-06-23 DIAGNOSIS — I10 BENIGN ESSENTIAL HTN: ICD-10-CM

## 2025-06-23 NOTE — TELEPHONE ENCOUNTER
Patient is calling because he an episode over the weekend of chest pain and discomfort.    Patient would like a call back from the nurse.    Patient did call the on call doctor and they told him to follow up with the cardiologist on today.    Patient is trying to come in today to be seen.    [phone] patient

## 2025-06-23 NOTE — TELEPHONE ENCOUNTER
R/t call to pt,    Has had episodes of discomfort in chest (a \"nip\").  Disappeared after a couple of seconds.  Happened about a year ago.  Lasted less than a minute, happened twice.  Today feeling fine.    Dr. Berger 5/19/25,  \"1) Atherosclerosis   - CAC score 1400 in 2006 per patient.    - No prior CV event event.   - Fitness level dropped after he had COVID  - ETT normal 2/2/24   - Denies any anginal complaints -- works out regularly.   - check a treadmill stress test periodically given high CAC score (consider a treadmill stress test next year) \"    Per TRINA Rodriguez, let's get a stress test now.  Will have pt do treadmill stress test; Instructions provided, including to hold Atenolol.  Made apt for stress test tomorrow.

## 2025-06-24 ENCOUNTER — ANCILLARY PROCEDURE (OUTPATIENT)
Age: 80
End: 2025-06-24
Payer: MEDICARE

## 2025-06-24 ENCOUNTER — RESULTS FOLLOW-UP (OUTPATIENT)
Age: 80
End: 2025-06-24

## 2025-06-24 VITALS
SYSTOLIC BLOOD PRESSURE: 138 MMHG | HEART RATE: 69 BPM | WEIGHT: 152 LBS | HEIGHT: 67 IN | DIASTOLIC BLOOD PRESSURE: 76 MMHG | BODY MASS INDEX: 23.86 KG/M2

## 2025-06-24 DIAGNOSIS — I10 BENIGN ESSENTIAL HTN: ICD-10-CM

## 2025-06-24 DIAGNOSIS — I25.10 CORONARY ARTERY DISEASE, NON-OCCLUSIVE: ICD-10-CM

## 2025-06-24 DIAGNOSIS — R07.89 CHEST DISCOMFORT: ICD-10-CM

## 2025-06-24 DIAGNOSIS — R93.1 HIGH CORONARY ARTERY CALCIUM SCORE: ICD-10-CM

## 2025-06-24 LAB
ECHO BSA: 1.81 M2
STRESS ANGINA INDEX: 0
STRESS BASELINE DIAS BP: 76 MMHG
STRESS BASELINE HR: 73 BPM
STRESS BASELINE SYS BP: 138 MMHG
STRESS ESTIMATED WORKLOAD: 11.5 METS
STRESS EXERCISE DUR MIN: 9 MIN
STRESS EXERCISE DUR SEC: 28 SEC
STRESS O2 SAT PEAK: 97 %
STRESS O2 SAT REST: 99 %
STRESS PEAK DIAS BP: 86 MMHG
STRESS PEAK SYS BP: 172 MMHG
STRESS PERCENT HR ACHIEVED: 95 %
STRESS POST PEAK HR: 131 BPM
STRESS RATE PRESSURE PRODUCT: NORMAL BPM*MMHG
STRESS TARGET HR: 138 BPM

## 2025-06-24 PROCEDURE — 93018 CV STRESS TEST I&R ONLY: CPT | Performed by: SPECIALIST

## 2025-06-24 PROCEDURE — 93017 CV STRESS TEST TRACING ONLY: CPT | Performed by: SPECIALIST

## 2025-06-24 PROCEDURE — 93016 CV STRESS TEST SUPVJ ONLY: CPT | Performed by: SPECIALIST

## 2025-08-22 ENCOUNTER — HOSPITAL ENCOUNTER (EMERGENCY)
Facility: HOSPITAL | Age: 80
Discharge: HOME | End: 2025-08-23
Attending: EMERGENCY MEDICINE
Payer: MEDICARE

## 2025-08-22 DIAGNOSIS — S61.512A WRIST LACERATION, LEFT, INITIAL ENCOUNTER: Primary | ICD-10-CM

## 2025-08-22 PROCEDURE — 99283 EMERGENCY DEPT VISIT LOW MDM: CPT

## 2025-08-22 PROCEDURE — 12002 RPR S/N/AX/GEN/TRNK2.6-7.5CM: CPT

## 2025-08-22 RX ORDER — LIDOCAINE HYDROCHLORIDE AND EPINEPHRINE 15; 5 MG/ML; UG/ML
10 INJECTION, SOLUTION EPIDURAL ONCE
Status: COMPLETED | OUTPATIENT
Start: 2025-08-22 | End: 2025-08-23

## 2025-08-22 ASSESSMENT — PAIN - FUNCTIONAL ASSESSMENT: PAIN_FUNCTIONAL_ASSESSMENT: 0-10

## 2025-08-22 ASSESSMENT — PAIN SCALES - GENERAL: PAINLEVEL_OUTOF10: 0

## 2025-08-23 VITALS
OXYGEN SATURATION: 98 % | RESPIRATION RATE: 18 BRPM | DIASTOLIC BLOOD PRESSURE: 80 MMHG | TEMPERATURE: 98.7 F | SYSTOLIC BLOOD PRESSURE: 145 MMHG | HEART RATE: 77 BPM

## 2025-08-23 PROCEDURE — 6360000002 HC RX W HCPCS: Performed by: EMERGENCY MEDICINE

## 2025-08-23 PROCEDURE — 12002 RPR S/N/AX/GEN/TRNK2.6-7.5CM: CPT

## 2025-08-23 RX ADMIN — LIDOCAINE HYDROCHLORIDE,EPINEPHRINE BITARTRATE 10 ML: 15; .005 INJECTION, SOLUTION EPIDURAL; INFILTRATION; INTRACAUDAL; PERINEURAL at 00:00

## 2025-08-23 ASSESSMENT — ENCOUNTER SYMPTOMS
GASTROINTESTINAL NEGATIVE: 1
EYES NEGATIVE: 1
RESPIRATORY NEGATIVE: 1

## 2025-08-23 ASSESSMENT — PAIN SCALES - GENERAL: PAINLEVEL_OUTOF10: 0

## 2025-09-02 ENCOUNTER — OFFICE VISIT (OUTPATIENT)
Age: 80
End: 2025-09-02

## 2025-09-02 VITALS
OXYGEN SATURATION: 96 % | SYSTOLIC BLOOD PRESSURE: 124 MMHG | DIASTOLIC BLOOD PRESSURE: 78 MMHG | BODY MASS INDEX: 24.33 KG/M2 | HEART RATE: 58 BPM | WEIGHT: 155 LBS | TEMPERATURE: 98.3 F | HEIGHT: 67 IN | RESPIRATION RATE: 14 BRPM

## 2025-09-02 DIAGNOSIS — Z48.02 VISIT FOR SUTURE REMOVAL: Primary | ICD-10-CM

## 2025-09-02 ASSESSMENT — ENCOUNTER SYMPTOMS
RESPIRATORY NEGATIVE: 1
EYES NEGATIVE: 1
GASTROINTESTINAL NEGATIVE: 1
ALLERGIC/IMMUNOLOGIC NEGATIVE: 1

## (undated) DEVICE — Device

## (undated) DEVICE — 1200 GUARD II KIT W/5MM TUBE W/O VAC TUBE: Brand: GUARDIAN

## (undated) DEVICE — TUBING ADMIN SET INTRAV ARTERI -- CONVERT TO ITEM 340436

## (undated) DEVICE — BITEBLOCK ENDOSCP 60FR MAXI WHT POLYETH STURDY W/ VELC WVN

## (undated) DEVICE — BAG BELONG PT PERS CLEAR HANDL

## (undated) DEVICE — NDL PRT INJ NSAF BLNT 18GX1.5 --

## (undated) DEVICE — CONTAINER SPEC 20 ML LID NEUT BUFF FORMALIN 10 % POLYPR STS

## (undated) DEVICE — KIT COLON W/ 1.1OZ LUB AND 2 END

## (undated) DEVICE — KENDALL RADIOLUCENT FOAM MONITORING ELECTRODE -RECTANGULAR SHAPE: Brand: KENDALL

## (undated) DEVICE — SOLIDIFIER MEDC 1200ML -- CONVERT TO 356117

## (undated) DEVICE — BAG SPEC BIOHZRD 10 X 10 IN --

## (undated) DEVICE — CANN NASAL O2 CAPNOGRAPHY AD -- FILTERLINE

## (undated) DEVICE — CATH IV AUTOGRD BC PNK 20GA 25 -- INSYTE

## (undated) DEVICE — NDL FLTR TIP 5 MIC 18GX1.5IN --

## (undated) DEVICE — FORCEPS BX L240CM JAW DIA2.8MM L CAP W/ NDL MIC MESH TOOTH